# Patient Record
Sex: FEMALE | Race: WHITE | NOT HISPANIC OR LATINO | Employment: FULL TIME | URBAN - METROPOLITAN AREA
[De-identification: names, ages, dates, MRNs, and addresses within clinical notes are randomized per-mention and may not be internally consistent; named-entity substitution may affect disease eponyms.]

---

## 2023-03-07 PROBLEM — R10.9 ABDOMINAL CRAMPING: Status: ACTIVE | Noted: 2023-03-07

## 2023-03-07 PROBLEM — R09.82 POST-NASAL DRIP: Status: ACTIVE | Noted: 2023-03-07

## 2023-03-07 PROBLEM — M79.89 SWELLING OF INDEX FINGER: Status: ACTIVE | Noted: 2023-03-07

## 2023-03-07 PROBLEM — J02.9 SORE THROAT: Status: ACTIVE | Noted: 2023-03-07

## 2023-03-07 PROBLEM — K52.9 ILEITIS: Status: ACTIVE | Noted: 2023-03-07

## 2023-03-07 PROBLEM — J34.89 RHINORRHEA: Status: ACTIVE | Noted: 2023-03-07

## 2023-03-07 PROBLEM — K76.0 FATTY LIVER: Status: ACTIVE | Noted: 2023-03-07

## 2023-03-07 PROBLEM — S80.01XA CONTUSION OF KNEE, RIGHT: Status: ACTIVE | Noted: 2023-03-07

## 2023-03-07 PROBLEM — R44.8 FACIAL PRESSURE: Status: ACTIVE | Noted: 2023-03-07

## 2023-03-07 PROBLEM — R41.3 MEMORY LOSS: Status: ACTIVE | Noted: 2023-03-07

## 2023-03-07 PROBLEM — K50.90 CROHN DISEASE (MULTI): Status: ACTIVE | Noted: 2023-03-07

## 2023-03-07 PROBLEM — R43.8 DECREASED SENSE OF SMELL: Status: ACTIVE | Noted: 2023-03-07

## 2023-03-07 PROBLEM — F17.200 NEEDS SMOKING CESSATION EDUCATION: Status: ACTIVE | Noted: 2023-03-07

## 2023-03-07 PROBLEM — J32.2 CHRONIC ETHMOIDAL SINUSITIS: Status: ACTIVE | Noted: 2023-03-07

## 2023-03-07 PROBLEM — H66.90 OTITIS MEDIA, RECURRENT: Status: ACTIVE | Noted: 2023-03-07

## 2023-03-07 PROBLEM — M25.561 RIGHT KNEE PAIN: Status: ACTIVE | Noted: 2023-03-07

## 2023-03-07 PROBLEM — R05.9 COUGH IN ADULT: Status: ACTIVE | Noted: 2023-03-07

## 2023-03-07 PROBLEM — J32.0 CHRONIC MAXILLARY SINUSITIS: Status: ACTIVE | Noted: 2023-03-07

## 2023-03-07 PROBLEM — M72.2 PLANTAR FASCIITIS, BILATERAL: Status: ACTIVE | Noted: 2023-03-07

## 2023-03-07 PROBLEM — B34.9 VIRAL SYNDROME: Status: ACTIVE | Noted: 2023-03-07

## 2023-03-07 PROBLEM — K52.9 CHRONIC DIARRHEA: Status: ACTIVE | Noted: 2023-03-07

## 2023-03-07 PROBLEM — R09.81 NASAL CONGESTION: Status: ACTIVE | Noted: 2023-03-07

## 2023-03-07 PROBLEM — J33.9 NASAL POLYP: Status: ACTIVE | Noted: 2023-03-07

## 2023-03-07 RX ORDER — TRIAMCINOLONE ACETONIDE 55 UG/1
2 SPRAY, METERED NASAL DAILY
COMMUNITY
End: 2023-10-31 | Stop reason: WASHOUT

## 2023-03-09 ENCOUNTER — OFFICE VISIT (OUTPATIENT)
Dept: PRIMARY CARE | Facility: CLINIC | Age: 53
End: 2023-03-09
Payer: COMMERCIAL

## 2023-03-09 VITALS
WEIGHT: 167 LBS | RESPIRATION RATE: 16 BRPM | SYSTOLIC BLOOD PRESSURE: 112 MMHG | HEIGHT: 60 IN | DIASTOLIC BLOOD PRESSURE: 68 MMHG | BODY MASS INDEX: 32.79 KG/M2 | TEMPERATURE: 96.7 F | OXYGEN SATURATION: 97 % | HEART RATE: 85 BPM

## 2023-03-09 DIAGNOSIS — Z00.00 PHYSICAL EXAM: Primary | ICD-10-CM

## 2023-03-09 DIAGNOSIS — M79.641 HAND PAIN, NOT ARTHRALGIA, RIGHT: ICD-10-CM

## 2023-03-09 DIAGNOSIS — Z71.6 ENCOUNTER FOR SMOKING CESSATION COUNSELING: ICD-10-CM

## 2023-03-09 PROCEDURE — 3008F BODY MASS INDEX DOCD: CPT | Performed by: NURSE PRACTITIONER

## 2023-03-09 PROCEDURE — 99213 OFFICE O/P EST LOW 20 MIN: CPT | Performed by: NURSE PRACTITIONER

## 2023-03-09 RX ORDER — IBUPROFEN 200 MG
1 TABLET ORAL ONCE
Status: DISCONTINUED | OUTPATIENT
Start: 2023-03-09 | End: 2023-03-09

## 2023-03-09 RX ORDER — IBUPROFEN 200 MG
1 TABLET ORAL EVERY 24 HOURS
Qty: 30 PATCH | Refills: 2 | Status: SHIPPED | OUTPATIENT
Start: 2023-03-09 | End: 2023-10-20 | Stop reason: ALTCHOICE

## 2023-03-09 RX ORDER — ACETAMINOPHEN 500 MG
1 TABLET ORAL EVERY 8 HOURS PRN
COMMUNITY
Start: 2023-01-09

## 2023-03-09 ASSESSMENT — PAIN SCALES - GENERAL: PAINLEVEL: 4

## 2023-03-09 NOTE — PROGRESS NOTES
Subjective   Patient ID: Elena Michelle is a 52 y.o. female who presents for Quit smokiing and Carpal Tunnel.    HPI     Patient presents to clinic for yearly physical exam.  Patient states she has been feeling okay but tired has been caring for ill family members.  No specific complaints states she would like to stop smoking.  Patient has been smoking 1-1/2 packs/day for over 30 years.    Patient with past medical history of Crohn's being followed by gastroenterologist here at .    Today patient also complains of her hands feeling sore and numb at times.  She is a  and does repetitive motions with her hands.  She states she is unable to wear a wrist splint while working.    Review of Systems   Constitutional: Negative.    HENT:          History of nasal polyps some nasal congestion.   Eyes: Negative.    Respiratory: Negative.     Cardiovascular:  Negative for chest pain and palpitations.   Gastrointestinal:  Negative for diarrhea, nausea and vomiting.   Genitourinary: Negative.    Musculoskeletal:         Bilateral hand pain and numbness.  Has concerns for carpal tunnel.   Skin: Negative.    Neurological: Negative.              Objective   /68   Pulse 85   Temp 35.9 °C (96.7 °F) (Temporal)   Resp 16   Ht 1.524 m (5')   Wt 75.8 kg (167 lb)   SpO2 97%   BMI 32.61 kg/m²     Physical Exam  Constitutional:       Appearance: Normal appearance.   HENT:      Head: Normocephalic.   Eyes:      Extraocular Movements: Extraocular movements intact.      Pupils: Pupils are equal, round, and reactive to light.   Cardiovascular:      Rate and Rhythm: Normal rate and regular rhythm.   Pulmonary:      Effort: Pulmonary effort is normal.      Breath sounds: Normal breath sounds.   Abdominal:      General: Abdomen is flat. Bowel sounds are normal.      Palpations: Abdomen is soft.   Musculoskeletal:         General: Normal range of motion.      Comments: Hands appears some mild swelling.  Not warm to touch  normal range of motion neurovascular intact.   Skin:     General: Skin is warm and dry.   Neurological:      Mental Status: She is alert and oriented to person, place, and time.   Psychiatric:         Mood and Affect: Mood normal.         Assessment/Plan   Problem List Items Addressed This Visit    None  Visit Diagnoses       Physical exam    -  Primary    Relevant Orders    CBC    Comprehensive Metabolic Panel    Lipid Panel    Hemoglobin A1C    Vitamin D, Total    Tsh With Reflex To Free T4 If Abnormal    Iron and TIBC    Referral to Nutrition Services    Referral to Obstetrics / Gynecology    Encounter for smoking cessation counseling        Relevant Medications    nicotine (Nicoderm CQ) 14 mg/24 hr patch    Hand pain, not arthralgia, right        Relevant Orders    Referral to Orthopaedic Surgery             -wear wrist splint as needed.             -Tylenol/Ibuprofen as needed.

## 2023-03-10 ASSESSMENT — ENCOUNTER SYMPTOMS
PALPITATIONS: 0
RESPIRATORY NEGATIVE: 1
NAUSEA: 0
CONSTITUTIONAL NEGATIVE: 1
DIARRHEA: 0
EYES NEGATIVE: 1
VOMITING: 0
NEUROLOGICAL NEGATIVE: 1

## 2023-03-24 ENCOUNTER — LAB (OUTPATIENT)
Dept: LAB | Facility: LAB | Age: 53
End: 2023-03-24
Payer: COMMERCIAL

## 2023-03-24 DIAGNOSIS — Z00.00 PHYSICAL EXAM: ICD-10-CM

## 2023-03-24 LAB
ALANINE AMINOTRANSFERASE (SGPT) (U/L) IN SER/PLAS: 24 U/L (ref 7–45)
ALBUMIN (G/DL) IN SER/PLAS: 4.1 G/DL (ref 3.4–5)
ALKALINE PHOSPHATASE (U/L) IN SER/PLAS: 99 U/L (ref 33–110)
ANION GAP IN SER/PLAS: 10 MMOL/L (ref 10–20)
ASPARTATE AMINOTRANSFERASE (SGOT) (U/L) IN SER/PLAS: 18 U/L (ref 9–39)
BILIRUBIN TOTAL (MG/DL) IN SER/PLAS: 0.4 MG/DL (ref 0–1.2)
CALCIDIOL (25 OH VITAMIN D3) (NG/ML) IN SER/PLAS: 36 NG/ML
CALCIUM (MG/DL) IN SER/PLAS: 9.3 MG/DL (ref 8.6–10.3)
CARBON DIOXIDE, TOTAL (MMOL/L) IN SER/PLAS: 27 MMOL/L (ref 21–32)
CHLORIDE (MMOL/L) IN SER/PLAS: 105 MMOL/L (ref 98–107)
CHOLESTEROL (MG/DL) IN SER/PLAS: 236 MG/DL (ref 0–199)
CHOLESTEROL IN HDL (MG/DL) IN SER/PLAS: 47.8 MG/DL
CHOLESTEROL/HDL RATIO: 4.9
CREATININE (MG/DL) IN SER/PLAS: 0.76 MG/DL (ref 0.5–1.05)
ERYTHROCYTE DISTRIBUTION WIDTH (RATIO) BY AUTOMATED COUNT: 12.8 % (ref 11.5–14.5)
ERYTHROCYTE MEAN CORPUSCULAR HEMOGLOBIN CONCENTRATION (G/DL) BY AUTOMATED: 32.5 G/DL (ref 32–36)
ERYTHROCYTE MEAN CORPUSCULAR VOLUME (FL) BY AUTOMATED COUNT: 91 FL (ref 80–100)
ERYTHROCYTES (10*6/UL) IN BLOOD BY AUTOMATED COUNT: 4.34 X10E12/L (ref 4–5.2)
ESTIMATED AVERAGE GLUCOSE FOR HBA1C: 123 MG/DL
GFR FEMALE: >90 ML/MIN/1.73M2
GLUCOSE (MG/DL) IN SER/PLAS: 113 MG/DL (ref 74–99)
HEMATOCRIT (%) IN BLOOD BY AUTOMATED COUNT: 39.4 % (ref 36–46)
HEMOGLOBIN (G/DL) IN BLOOD: 12.8 G/DL (ref 12–16)
HEMOGLOBIN A1C/HEMOGLOBIN TOTAL IN BLOOD: 5.9 %
IRON (UG/DL) IN SER/PLAS: 79 UG/DL (ref 35–150)
IRON BINDING CAPACITY (UG/DL) IN SER/PLAS: 368 UG/DL (ref 240–445)
IRON SATURATION (%) IN SER/PLAS: 21 % (ref 25–45)
LDL: 160 MG/DL (ref 0–99)
LEUKOCYTES (10*3/UL) IN BLOOD BY AUTOMATED COUNT: 8.9 X10E9/L (ref 4.4–11.3)
NRBC (PER 100 WBCS) BY AUTOMATED COUNT: 0 /100 WBC (ref 0–0)
PLATELETS (10*3/UL) IN BLOOD AUTOMATED COUNT: 319 X10E9/L (ref 150–450)
POTASSIUM (MMOL/L) IN SER/PLAS: 4.3 MMOL/L (ref 3.5–5.3)
PROTEIN TOTAL: 6.6 G/DL (ref 6.4–8.2)
SODIUM (MMOL/L) IN SER/PLAS: 138 MMOL/L (ref 136–145)
THYROTROPIN (MIU/L) IN SER/PLAS BY DETECTION LIMIT <= 0.05 MIU/L: 4.68 MIU/L (ref 0.44–3.98)
THYROXINE (T4) FREE (NG/DL) IN SER/PLAS: 0.72 NG/DL (ref 0.61–1.12)
TRIGLYCERIDE (MG/DL) IN SER/PLAS: 139 MG/DL (ref 0–149)
UREA NITROGEN (MG/DL) IN SER/PLAS: 11 MG/DL (ref 6–23)
VLDL: 28 MG/DL (ref 0–40)

## 2023-03-24 PROCEDURE — 36415 COLL VENOUS BLD VENIPUNCTURE: CPT

## 2023-03-24 PROCEDURE — 84443 ASSAY THYROID STIM HORMONE: CPT

## 2023-03-24 PROCEDURE — 80053 COMPREHEN METABOLIC PANEL: CPT

## 2023-03-24 PROCEDURE — 83550 IRON BINDING TEST: CPT

## 2023-03-24 PROCEDURE — 84439 ASSAY OF FREE THYROXINE: CPT

## 2023-03-24 PROCEDURE — 82306 VITAMIN D 25 HYDROXY: CPT

## 2023-03-24 PROCEDURE — 83540 ASSAY OF IRON: CPT

## 2023-03-24 PROCEDURE — 83036 HEMOGLOBIN GLYCOSYLATED A1C: CPT

## 2023-03-24 PROCEDURE — 85027 COMPLETE CBC AUTOMATED: CPT

## 2023-03-24 PROCEDURE — 80061 LIPID PANEL: CPT

## 2023-03-28 ENCOUNTER — TELEPHONE (OUTPATIENT)
Dept: PRIMARY CARE | Facility: CLINIC | Age: 53
End: 2023-03-28
Payer: COMMERCIAL

## 2023-03-28 NOTE — TELEPHONE ENCOUNTER
Phoned patient to discuss lab results.  TSH level 4.68 T4 normal.,  Discussed supplements with patient that can alter thyroid levels.  Recheck TSH level in 6 weeks for next visit., serum cholesterol and LDL both elevated discussed healthy lifestyle changes with diet and exercises.

## 2023-09-24 ENCOUNTER — HOSPITAL ENCOUNTER (OUTPATIENT)
Dept: DATA CONVERSION | Facility: HOSPITAL | Age: 53
Setting detail: OBSERVATION
Discharge: HOME | End: 2023-09-28
Attending: INTERNAL MEDICINE | Admitting: INTERNAL MEDICINE
Payer: COMMERCIAL

## 2023-09-24 LAB
ALANINE AMINOTRANSFERASE (SGPT) (U/L) IN SER/PLAS: 35 U/L (ref 7–45)
ALBUMIN (G/DL) IN SER/PLAS: 4.2 G/DL (ref 3.4–5)
ALKALINE PHOSPHATASE (U/L) IN SER/PLAS: 102 U/L (ref 33–110)
ANION GAP IN SER/PLAS: 14 MMOL/L (ref 10–20)
ASPARTATE AMINOTRANSFERASE (SGOT) (U/L) IN SER/PLAS: 24 U/L (ref 9–39)
BASOPHILS (10*3/UL) IN BLOOD BY AUTOMATED COUNT: 0.04 X10E9/L (ref 0–0.1)
BASOPHILS/100 LEUKOCYTES IN BLOOD BY AUTOMATED COUNT: 0.3 % (ref 0–2)
BILIRUBIN TOTAL (MG/DL) IN SER/PLAS: 0.6 MG/DL (ref 0–1.2)
CALCIUM (MG/DL) IN SER/PLAS: 9.9 MG/DL (ref 8.6–10.3)
CARBON DIOXIDE, TOTAL (MMOL/L) IN SER/PLAS: 25 MMOL/L (ref 21–32)
CHLORIDE (MMOL/L) IN SER/PLAS: 101 MMOL/L (ref 98–107)
CREATININE (MG/DL) IN SER/PLAS: 0.75 MG/DL (ref 0.5–1.05)
EOSINOPHILS (10*3/UL) IN BLOOD BY AUTOMATED COUNT: 0.49 X10E9/L (ref 0–0.7)
EOSINOPHILS/100 LEUKOCYTES IN BLOOD BY AUTOMATED COUNT: 3.7 % (ref 0–6)
ERYTHROCYTE DISTRIBUTION WIDTH (RATIO) BY AUTOMATED COUNT: 12.6 % (ref 11.5–14.5)
ERYTHROCYTE MEAN CORPUSCULAR HEMOGLOBIN CONCENTRATION (G/DL) BY AUTOMATED: 32.8 G/DL (ref 32–36)
ERYTHROCYTE MEAN CORPUSCULAR VOLUME (FL) BY AUTOMATED COUNT: 86 FL (ref 80–100)
ERYTHROCYTES (10*6/UL) IN BLOOD BY AUTOMATED COUNT: 4.9 X10E12/L (ref 4–5.2)
GFR FEMALE: >90 ML/MIN/1.73M2
GLUCOSE (MG/DL) IN SER/PLAS: 100 MG/DL (ref 74–99)
HEMATOCRIT (%) IN BLOOD BY AUTOMATED COUNT: 42.1 % (ref 36–46)
HEMOGLOBIN (G/DL) IN BLOOD: 13.8 G/DL (ref 12–16)
IMMATURE GRANULOCYTES/100 LEUKOCYTES IN BLOOD BY AUTOMATED COUNT: 0.3 % (ref 0–0.9)
LEUKOCYTES (10*3/UL) IN BLOOD BY AUTOMATED COUNT: 13.4 X10E9/L (ref 4.4–11.3)
LYMPHOCYTES (10*3/UL) IN BLOOD BY AUTOMATED COUNT: 3.7 X10E9/L (ref 1.2–4.8)
LYMPHOCYTES/100 LEUKOCYTES IN BLOOD BY AUTOMATED COUNT: 27.6 % (ref 13–44)
MAGNESIUM (MG/DL) IN SER/PLAS: 2.06 MG/DL (ref 1.6–2.4)
MONOCYTES (10*3/UL) IN BLOOD BY AUTOMATED COUNT: 0.73 X10E9/L (ref 0.1–1)
MONOCYTES/100 LEUKOCYTES IN BLOOD BY AUTOMATED COUNT: 5.4 % (ref 2–10)
NEUTROPHILS (10*3/UL) IN BLOOD BY AUTOMATED COUNT: 8.42 X10E9/L (ref 1.2–7.7)
NEUTROPHILS/100 LEUKOCYTES IN BLOOD BY AUTOMATED COUNT: 62.7 % (ref 40–80)
NRBC (PER 100 WBCS) BY AUTOMATED COUNT: 0 /100 WBC (ref 0–0)
PLATELETS (10*3/UL) IN BLOOD AUTOMATED COUNT: 369 X10E9/L (ref 150–450)
POTASSIUM (MMOL/L) IN SER/PLAS: 3.6 MMOL/L (ref 3.5–5.3)
PROTEIN TOTAL: 7 G/DL (ref 6.4–8.2)
SODIUM (MMOL/L) IN SER/PLAS: 136 MMOL/L (ref 136–145)
UREA NITROGEN (MG/DL) IN SER/PLAS: 17 MG/DL (ref 6–23)

## 2023-09-25 LAB
ALANINE AMINOTRANSFERASE (SGPT) (U/L) IN SER/PLAS: 34 U/L (ref 7–45)
ALBUMIN (G/DL) IN SER/PLAS: 3.3 G/DL (ref 3.4–5)
ALKALINE PHOSPHATASE (U/L) IN SER/PLAS: 84 U/L (ref 33–110)
ANION GAP IN SER/PLAS: 9 MMOL/L (ref 10–20)
ASPARTATE AMINOTRANSFERASE (SGOT) (U/L) IN SER/PLAS: 27 U/L (ref 9–39)
BILIRUBIN TOTAL (MG/DL) IN SER/PLAS: 0.6 MG/DL (ref 0–1.2)
C. DIFFICILE TOXIN, PCR: NOT DETECTED
CALCIUM (MG/DL) IN SER/PLAS: 9.2 MG/DL (ref 8.6–10.3)
CARBON DIOXIDE, TOTAL (MMOL/L) IN SER/PLAS: 28 MMOL/L (ref 21–32)
CHLORIDE (MMOL/L) IN SER/PLAS: 106 MMOL/L (ref 98–107)
CREATININE (MG/DL) IN SER/PLAS: 0.84 MG/DL (ref 0.5–1.05)
ERYTHROCYTE DISTRIBUTION WIDTH (RATIO) BY AUTOMATED COUNT: 12.7 % (ref 11.5–14.5)
ERYTHROCYTE MEAN CORPUSCULAR HEMOGLOBIN CONCENTRATION (G/DL) BY AUTOMATED: 32.8 G/DL (ref 32–36)
ERYTHROCYTE MEAN CORPUSCULAR VOLUME (FL) BY AUTOMATED COUNT: 88 FL (ref 80–100)
ERYTHROCYTES (10*6/UL) IN BLOOD BY AUTOMATED COUNT: 4.14 X10E12/L (ref 4–5.2)
GFR FEMALE: 84 ML/MIN/1.73M2
GLUCOSE (MG/DL) IN SER/PLAS: 121 MG/DL (ref 74–99)
HEMATOCRIT (%) IN BLOOD BY AUTOMATED COUNT: 36.6 % (ref 36–46)
HEMOGLOBIN (G/DL) IN BLOOD: 12 G/DL (ref 12–16)
LEUKOCYTES (10*3/UL) IN BLOOD BY AUTOMATED COUNT: 10.7 X10E9/L (ref 4.4–11.3)
NRBC (PER 100 WBCS) BY AUTOMATED COUNT: 0 /100 WBC (ref 0–0)
PLATELETS (10*3/UL) IN BLOOD AUTOMATED COUNT: 325 X10E9/L (ref 150–450)
POTASSIUM (MMOL/L) IN SER/PLAS: 3.6 MMOL/L (ref 3.5–5.3)
PROTEIN TOTAL: 5.5 G/DL (ref 6.4–8.2)
SODIUM (MMOL/L) IN SER/PLAS: 139 MMOL/L (ref 136–145)
UREA NITROGEN (MG/DL) IN SER/PLAS: 13 MG/DL (ref 6–23)

## 2023-09-26 LAB
ANION GAP IN SER/PLAS: 11 MMOL/L (ref 10–20)
ANION GAP IN SER/PLAS: 12 MMOL/L (ref 10–20)
APPEARANCE, URINE: NORMAL
ASCORBIC ACID: NORMAL MG/DL
BILIRUBIN, URINE: NORMAL
BLOOD, URINE: NORMAL
CALCIUM (MG/DL) IN SER/PLAS: 9 MG/DL (ref 8.6–10.3)
CALCIUM (MG/DL) IN SER/PLAS: 9.4 MG/DL (ref 8.6–10.3)
CAMPYLOBACTER GP: NOT DETECTED
CARBON DIOXIDE, TOTAL (MMOL/L) IN SER/PLAS: 24 MMOL/L (ref 21–32)
CARBON DIOXIDE, TOTAL (MMOL/L) IN SER/PLAS: 26 MMOL/L (ref 21–32)
CHLORIDE (MMOL/L) IN SER/PLAS: 105 MMOL/L (ref 98–107)
CHLORIDE (MMOL/L) IN SER/PLAS: 107 MMOL/L (ref 98–107)
COLOR, URINE: NORMAL
CREATININE (MG/DL) IN SER/PLAS: 0.64 MG/DL (ref 0.5–1.05)
CREATININE (MG/DL) IN SER/PLAS: 0.82 MG/DL (ref 0.5–1.05)
ERYTHROCYTE DISTRIBUTION WIDTH (RATIO) BY AUTOMATED COUNT: 12.5 % (ref 11.5–14.5)
ERYTHROCYTE MEAN CORPUSCULAR HEMOGLOBIN CONCENTRATION (G/DL) BY AUTOMATED: 33.8 G/DL (ref 32–36)
ERYTHROCYTE MEAN CORPUSCULAR VOLUME (FL) BY AUTOMATED COUNT: 87 FL (ref 80–100)
ERYTHROCYTES (10*6/UL) IN BLOOD BY AUTOMATED COUNT: 4.24 X10E12/L (ref 4–5.2)
GFR FEMALE: 86 ML/MIN/1.73M2
GFR FEMALE: >90 ML/MIN/1.73M2
GLUCOSE (MG/DL) IN SER/PLAS: 124 MG/DL (ref 74–99)
GLUCOSE (MG/DL) IN SER/PLAS: 132 MG/DL (ref 74–99)
GLUCOSE, URINE: NORMAL
HEMATOCRIT (%) IN BLOOD BY AUTOMATED COUNT: 36.7 % (ref 36–46)
HEMOGLOBIN (G/DL) IN BLOOD: 12.4 G/DL (ref 12–16)
KETONES, URINE: NORMAL
LEUKOCYTE ESTERASE, URINE: NORMAL
LEUKOCYTES (10*3/UL) IN BLOOD BY AUTOMATED COUNT: 7.3 X10E9/L (ref 4.4–11.3)
NITRITE, URINE: NORMAL
NOROVIRUS GI/GII: NOT DETECTED
NRBC (PER 100 WBCS) BY AUTOMATED COUNT: 0 /100 WBC (ref 0–0)
PH, URINE: NORMAL
PLATELETS (10*3/UL) IN BLOOD AUTOMATED COUNT: 329 X10E9/L (ref 150–450)
POTASSIUM (MMOL/L) IN SER/PLAS: 3.6 MMOL/L (ref 3.5–5.3)
POTASSIUM (MMOL/L) IN SER/PLAS: 3.7 MMOL/L (ref 3.5–5.3)
PROTEIN, URINE: NORMAL
ROTAVIRUS A: NOT DETECTED
SALMONELLA SP.: NOT DETECTED
SHIGA TOXIN 1: NOT DETECTED
SHIGA TOXIN 2: NOT DETECTED
SHIGELLA SP.: NOT DETECTED
SODIUM (MMOL/L) IN SER/PLAS: 137 MMOL/L (ref 136–145)
SODIUM (MMOL/L) IN SER/PLAS: 140 MMOL/L (ref 136–145)
SPECIFIC GRAVITY, URINE: NORMAL
UREA NITROGEN (MG/DL) IN SER/PLAS: 5 MG/DL (ref 6–23)
UREA NITROGEN (MG/DL) IN SER/PLAS: 8 MG/DL (ref 6–23)
UROBILINOGEN, URINE: NORMAL
VIBRIO GRP.: NOT DETECTED
YERSINIA ENTEROCOLITICA: NOT DETECTED

## 2023-09-27 LAB
ANION GAP IN SER/PLAS: 12 MMOL/L (ref 10–20)
CALCIUM (MG/DL) IN SER/PLAS: 9 MG/DL (ref 8.6–10.3)
CARBON DIOXIDE, TOTAL (MMOL/L) IN SER/PLAS: 26 MMOL/L (ref 21–32)
CHLORIDE (MMOL/L) IN SER/PLAS: 104 MMOL/L (ref 98–107)
CREATININE (MG/DL) IN SER/PLAS: 0.68 MG/DL (ref 0.5–1.05)
ERYTHROCYTE DISTRIBUTION WIDTH (RATIO) BY AUTOMATED COUNT: 12.4 % (ref 11.5–14.5)
ERYTHROCYTE MEAN CORPUSCULAR HEMOGLOBIN CONCENTRATION (G/DL) BY AUTOMATED: 33 G/DL (ref 32–36)
ERYTHROCYTE MEAN CORPUSCULAR VOLUME (FL) BY AUTOMATED COUNT: 86 FL (ref 80–100)
ERYTHROCYTES (10*6/UL) IN BLOOD BY AUTOMATED COUNT: 4.22 X10E12/L (ref 4–5.2)
GFR FEMALE: >90 ML/MIN/1.73M2
GLUCOSE (MG/DL) IN SER/PLAS: 110 MG/DL (ref 74–99)
HEMATOCRIT (%) IN BLOOD BY AUTOMATED COUNT: 36.1 % (ref 36–46)
HEMOGLOBIN (G/DL) IN BLOOD: 11.9 G/DL (ref 12–16)
LEUKOCYTES (10*3/UL) IN BLOOD BY AUTOMATED COUNT: 9.6 X10E9/L (ref 4.4–11.3)
MAGNESIUM (MG/DL) IN SER/PLAS: 1.77 MG/DL (ref 1.6–2.4)
NRBC (PER 100 WBCS) BY AUTOMATED COUNT: 0 /100 WBC (ref 0–0)
PLATELETS (10*3/UL) IN BLOOD AUTOMATED COUNT: 305 X10E9/L (ref 150–450)
POTASSIUM (MMOL/L) IN SER/PLAS: 3.3 MMOL/L (ref 3.5–5.3)
SODIUM (MMOL/L) IN SER/PLAS: 139 MMOL/L (ref 136–145)
UREA NITROGEN (MG/DL) IN SER/PLAS: 5 MG/DL (ref 6–23)

## 2023-09-29 VITALS
DIASTOLIC BLOOD PRESSURE: 87 MMHG | SYSTOLIC BLOOD PRESSURE: 154 MMHG | TEMPERATURE: 98.1 F | WEIGHT: 158.73 LBS | OXYGEN SATURATION: 96 % | HEART RATE: 83 BPM | BODY MASS INDEX: 29.97 KG/M2 | HEIGHT: 61 IN | RESPIRATION RATE: 18 BRPM

## 2023-09-30 NOTE — PROGRESS NOTES
Service: General Internal Medicine     Subjective Data:   JEROME DILLON is a 53 year old Female who is Hospital Day # 3.    Additional Information:    No events overnight. Stool studies negative. Started on steroids. Feeling slightly better but hesitant to eat.    Objective Data:     Objective Information:      T   P  R  BP   MAP  SpO2   Value  36  75  18  123/65   89  94%  Date/Time 9/26 15:32 9/26 15:32 9/26 15:32 9/26 15:32  9/26 15:32 9/26 15:32  Range  (36C - 36.7C )  (61 - 79 )  (18 - 18 )  (85 - 165 )/ (48 - 82 )  (63 - 116 )  (92% - 98% )      Pain reported at 9/26 10:00: 0 = None    Physical Exam by System:    Constitutional: Well developed, awake/alert/oriented  x3, no distress, alert and cooperative   Eyes: clear sclera   ENMT: mucous membranes moist, no apparent injury,  no lesions seen   Head/Neck: Neck supple, no apparent injury   Respiratory/Thorax: Patent airways, CTAB, normal  breath sounds with good chest expansion, thorax symmetric   Cardiovascular: Regular rate and rhythm, no murmurs,  2+ equal pulses of the extremities, normal S 1and S 2   Gastrointestinal: mildly distended, soft, tender  right side, no rebound tenderness or guarding, no masses palpable, no organomegaly, +BS   Musculoskeletal: ROM intact, no joint swelling, normal  strength   Extremities: normal extremities, no cyanosis, no  edema, no contusions or wounds, no clubbing   Neurological: alert and oriented, intact motor response,  normal strength   Lymphatic: No significant lymphadenopathy   Psychological: Appropriate mood and behavior   Skin: warm and dry, no lesions, no rashes     Medication:    Medications:          Continuous Medications       --------------------------------    1. Dextrose 5% - Lactated Ringers Infusion:  1000  mL  IntraVenous  <Continuous>         Scheduled Medications       --------------------------------    1. Cefepime IV Piggy Back:  2  gram(s)  IntraVenous Piggyback  Every 8 Hours    2. Enoxaparin  SubCutaneous:  40  mg  SubCutaneous  Every 24 Hours    3. methylPREDNISolone Sodium Succinate Injectable:  60  mg  IntraVenous Push  Every 24 Hours    4. metroNIDAZOLE (FLAGYL) 500 mg IVPB/ Premixed Soln 100 mL:  100  mL  IntraVenous Piggyback  Every 8 Hours    5. Nicotine 21 mg/ 24 hour TransDermal:  1  patch  TransDermal  Every 24 Hours    6. Pantoprazole Injectable:  40  mg  IntraVenous Push  Every 24 Hours         PRN Medications       --------------------------------    1. Morphine Injectable:  4  mg  IntraVenous Push  Every 4 Hours    2. Nicotine Transmucosal:  2  mg  Oral  Every 4 Hours    3. Ondansetron Injectable:  4  mg  IntraVenous Push  Every 6 Hours    4. Sodium Chloride 0.9% Injectable Flush:  10  mL  IntraVenous Flush  Every 8 Hours and as Needed        Recent Lab Results:    Results:        BMP: 9/26/2023 08:33  NA+        Cl-     BUN  /                         140    107    8  /  --------------------------------  Glucose                ---------------------------  132 H    K+     HCO3-   Creat \                         3.7  26    0.82  \  Calcium : 9.4     Anion Gap : 11      Assessment and Plan:   Code Status:  ·  Code Status Full Code     Assessment:    JEROME DILLON is a 53 year old Female with past medical history of Crohn's disease, chronic sinusitis  and nasal obstruction with bilateral sinus/nasal surgery who presented today with abdominal pain, nausea, vomiting, and diarrhea.    A/P:    1. Pancolitis  Suspected Crohn's Flare    admit to [facility]  observation  appreciate GI recommendation  start cefepime and flagyl  Stool studies negative, C diff negative  D5LR at 125cc/hr  continue morphine  CLD  CBC, CMP in am  Budesonide     2. DVT Prophylaxis    SCDs  lovenox          Electronic Signatures:  Surjit Rodriguez ()  (Signed 26-Sep-2023 20:23)   Authored: Service, Subjective Data, Objective Data, Assessment  and Plan, Note Completion      Last Updated: 26-Sep-2023 20:23 by Michael  Surjit JOY)

## 2023-09-30 NOTE — DISCHARGE SUMMARY
Send Summary:   Discharge Summary Providers:  Provider Role Provider Name   · Attending Surjit Rodriguez   · Consulting Haresh Parnell   · Primary Aleja Box   · Primary Moisés Santos       Note Recipients: Aleja Box APRN-CNP       Discharge:    Summary:   Admission Date: .24-Sep-2023 10:09:00   Discharge Date: 27-Sep-2023   Attending Physician at Discharge: Surjit Rodriguez   Admission Reason: pancolitis   Final Discharge Diagnoses: Pancolitis   Procedures: none   Condition at Discharge: Satisfactory   Disposition at Discharge: .Home   Physical Exam:    Constitutional: Well developed, awake/alert/oriented  x3, no distress, alert and cooperative   Eyes: clear sclera   ENMT: mucous membranes moist, no apparent  injury, no lesions seen   Head/Neck: Neck supple, no apparent injury   Respiratory/Thorax: Patent airways, CTAB,  normal breath sounds with good chest expansion, thorax symmetric   Cardiovascular: Regular rate and rhythm,  no murmurs, 2+ equal pulses of the extremities, normal S 1and S 2   Gastrointestinal: mildly distended, soft,  tender right side, no rebound tenderness or guarding, no masses palpable, no organomegaly, +BS   Musculoskeletal: ROM intact, no joint swelling,  normal strength   Extremities: normal extremities, no cyanosis,  no edema, no contusions or wounds, no clubbing   Neurological: alert and oriented, intact  motor response, normal strength   Lymphatic: No significant lymphadenopathy   Psychological: Appropriate mood and behavior   Skin: warm and dry, no lesions, no rashes     Hospital Course:    JEROME DILLON is a 53 year old Female with past medical history of Crohn's disease, chronic sinusitis  and nasal obstruction with bilateral sinus/nasal surgery who presented today with abdominal pain, nausea, vomiting, and diarrhea.    A/P:    1. Pancolitis  Suspected Crohn's Flare    admit to [facility]  observation  appreciate GI recommendation  start cefepime  and flagyl  Stool studies negative, C diff negative  D5LR at 125cc/hr  continue morphine  CLD  CBC, CMP in am  Budesonide     2. DVT Prophylaxis    SCDs  lovenox    Dispo: treated with steroids by GI and discharged home after tolerating a diet.      Discharge Information:    and Continuing Care:   Lab Results - Pending:    None  Radiology Results - Pending: None   Discharge Instructions:    Activity:           activity as tolerated.    Nutrition/Diet:           GI soft, low fiber    Discharge Medications: Home Medication   budesonide 9 mg oral tablet, extended release - 1 tab(s) orally once a day   nicotine 21 mg/24 hr transdermal film, extended release - 1 patch transdermal every 24 hours  amoxicillin-clavulanate 875 mg-125 mg oral tablet - 1 tab(s) orally 2 times a day      PRN Medication     DNR Status:   ·  Code Status Code Status order at time of discharge: Full Code       Electronic Signatures:  Surjit Rodriguez)  (Signed 29-Sep-2023 14:37)   Authored: Send Summary, Summary Content, Ongoing Care,  DNR Status, Note Completion      Last Updated: 29-Sep-2023 14:37 by Surjit Rodriguez ()

## 2023-09-30 NOTE — PROGRESS NOTES
Service: Gastroenterology     Subjective Data:   JEROME DILLON is a 53 year old Female who is Hospital Day # 4.     Patient states her symptoms initially improved yesterday after IV steroid administration, however later that evening her symptoms returned.  States her diarrhea and abdominal pain is not as severe as  it was when she first presented.    Objective Data:     Objective Information:      T   P  R  BP   MAP  SpO2   Value  36.5  77  18  156/87   117  93%  Date/Time 9/27 7:33 9/27 7:33 9/27 7:33 9/27 7:33 9/27 7:33 9/27 7:33  Range  (36C - 36.7C )  (72 - 79 )  (16 - 18 )  (116 - 156 )/ (58 - 87 )  (83 - 117 )  (92% - 94% )      Pain reported at 9/26 22:50: 8 = Severe    Physical Exam Narrative:  ·  Physical Exam:    Physical Exam:  General: Alert, awake.  Cooperative.  HEENT:  Normocephalic, atraumatic, mucus membranes moist.   Neck:  Trachea midline.  No JVD.    Chest:  Clear to auscultation bilaterally. No wheezes, rales, or rhonchi.  CV:  Regular rate and rhythm.  Positive S1/S2. No murmur, no gallops, no rubs  GI: Bowel sounds present in all four quadrants, abdomen is soft,  tender to palpation.  Extremities:  No lower extremity edema or cyanosis.   Neurological:  AAOx3. No focal deficits.  Skin:  Warm and dry.    Medication:    Medications:          Continuous Medications       --------------------------------    1. Dextrose 5% - Lactated Ringers Infusion:  1000  mL  IntraVenous  <Continuous>         Scheduled Medications       --------------------------------    1. Cefepime IV Piggy Back:  2  gram(s)  IntraVenous Piggyback  Every 8 Hours    2. Enoxaparin SubCutaneous:  40  mg  SubCutaneous  Every 24 Hours    3. methylPREDNISolone Sodium Succinate Injectable:  60  mg  IntraVenous Push  Every 24 Hours    4. metroNIDAZOLE (FLAGYL) 500 mg IVPB/ Premixed Soln 100 mL:  100  mL  IntraVenous Piggyback  Every 8 Hours    5. Nicotine 21 mg/ 24 hour TransDermal:  1  patch  TransDermal  Every 24 Hours    6.  Pantoprazole Injectable:  40  mg  IntraVenous Push  Every 24 Hours         PRN Medications       --------------------------------    1. Morphine Injectable:  2  mg  IntraVenous Push  Every 4 Hours    2. Nicotine Transmucosal:  2  mg  Oral  Every 4 Hours    3. Ondansetron Injectable:  4  mg  IntraVenous Push  Every 6 Hours    4. Sodium Chloride 0.9% Injectable Flush:  10  mL  IntraVenous Flush  Every 8 Hours and as Needed        Recent Lab Results:    Results:    CBC: 9/27/2023 06:27              \     Hgb     /                              \     11.9 L    /  WBC  ----------------  Plt               9.6       ----------------    305              /     Hct     \                              /     36.1       \            RBC: 4.22     MCV: 86           BMP: 9/27/2023 06:27  NA+        Cl-     BUN  /                         139    104    5 L /  --------------------------------  Glucose                ---------------------------  110 H    K+     HCO3-   Creat \                         3.3 L 26    0.68  \  Calcium : 9.0     Anion Gap : 12      Assessment and Plan:   Code Status:  ·  Code Status Full Code     Assessment:    53 year old Female with past medical history of Crohn's disease, chronic sinusitis and nasal obstruction  with bilateral sinus/nasal surgery who presented for abdominal pain and diarrhea.     #Acute Crohn's flare  -Advance to soft diet  -Patient reports improvement with IV solumedrol, which can be continued while she is inpatient.    -Upon discharge she should be transitioned to budesonide 9 mg daily until outpatient GI follow-up with Dr. Parnell.    Ede Cannon D.O.  Internal Medicine PGY-3  [id number] or Ar[facility]    This is a preliminary note with physician attestation to follow.        Attestation:   Note Completion:  I am a:  Resident/Fellow   Attending Attestation I saw and evaluated the patient.  I personally obtained the key and critical portions of the history and physical exam or was  physically present for key and  critical portions performed by the resident/fellow. I reviewed the resident/fellow?s documentation and discussed the patient with the resident/fellow.  I agree with the resident/fellow?s medical decision making as documented in the note.     I personally evaluated the patient on 27-Sep-2023   Comments/ Additional Findings    Ok to keep follow up on 10/9. D/C on budesonide 9mg daily. If no improvement she can contact office and change to prednisone 40-60mg daily.  Needs  to re assess long term therapy and re consider Stelara with Dr. Parnell or consider another biologic given now enterocolitis present on CT          Electronic Signatures:  Ede Cannon (DO (Resident))  (Signed 27-Sep-2023 14:30)   Authored: Service, Subjective Data, Objective Data, Assessment  and Plan, Note Completion  Henna Vinson)  (Signed 27-Sep-2023 21:39)   Authored: Note Completion   Co-Signer: Service, Subjective Data, Objective Data, Assessment and Plan, Note Completion      Last Updated: 27-Sep-2023 21:39 by Henna Vinson)

## 2023-09-30 NOTE — PROGRESS NOTES
Service: Gastroenterology     Subjective Data:   JEROME DILLON is a 53 year old Female who is Hospital Day # 3.     Patient states her symptoms returned after having apple juice yesterday.  States she had 10+ watery bowel movements and abdominal pain.  She also endorses nausea.    Objective Data:     Objective Information:      T   P  R  BP   MAP  SpO2   Value  36.5  79  18  127/58   83  92%  Date/Time 9/26 7:36 9/26 7:36 9/26 7:36 9/26 7:36  9/26 7:36 9/26 7:36  Range  (36C - 36.7C )  (61 - 79 )  (18 - 18 )  (85 - 165 )/ (48 - 82 )  (63 - 116 )  (92% - 98% )      Pain reported at 9/26 10:00: 0 = None    Physical Exam Narrative:  ·  Physical Exam:    Physical Exam:  General: Alert, awake.  Cooperative.  HEENT:  Normocephalic, atraumatic, mucus membranes moist.   Neck:  Trachea midline.  No JVD.    Chest:  Clear to auscultation bilaterally. No wheezes, rales, or rhonchi.  CV:  Regular rate and rhythm.  Positive S1/S2. No murmur, no gallops, no rubs  GI: Bowel sounds present in all four quadrants, abdomen is soft,  tender to palpation.  Extremities:  No lower extremity edema or cyanosis.   Neurological:  AAOx3. No focal deficits.  Skin:  Warm and dry.    Medication:    Medications:          Continuous Medications       --------------------------------    1. Dextrose 5% - Lactated Ringers Infusion:  1000  mL  IntraVenous  <Continuous>         Scheduled Medications       --------------------------------    1. Cefepime IV Piggy Back:  2  gram(s)  IntraVenous Piggyback  Every 8 Hours    2. Enoxaparin SubCutaneous:  40  mg  SubCutaneous  Every 24 Hours    3. Influenza Virus QUADRIVALENT (Inactive) ADULT Vaccine:  0.5  mL  IntraMuscular  Once    4. methylPREDNISolone Sodium Succinate Injectable:  60  mg  IntraVenous Push  Every 24 Hours    5. metroNIDAZOLE (FLAGYL) 500 mg IVPB/ Premixed Soln 100 mL:  100  mL  IntraVenous Piggyback  Every 8 Hours    6. Nicotine 21 mg/ 24 hour TransDermal:  1  patch  TransDermal   Every 24 Hours    7. Pantoprazole Injectable:  40  mg  IntraVenous Push  Every 24 Hours         PRN Medications       --------------------------------    1. Morphine Injectable:  4  mg  IntraVenous Push  Every 4 Hours    2. Nicotine Transmucosal:  2  mg  Oral  Every 4 Hours    3. Ondansetron Injectable:  4  mg  IntraVenous Push  Every 6 Hours    4. Sodium Chloride 0.9% Injectable Flush:  10  mL  IntraVenous Flush  Every 8 Hours and as Needed        Recent Lab Results:    Results:        BMP: 9/26/2023 08:33  NA+        Cl-     BUN  /                         140    107    8  /  --------------------------------  Glucose                ---------------------------  132 H    K+     HCO3-   Creat \                         3.7  26    0.82  \  Calcium : 9.4     Anion Gap : 11      Assessment and Plan:   Code Status:  ·  Code Status Full Code     Assessment:    53 year old Female with past medical history of Crohn's disease, chronic sinusitis and nasal obstruction  with bilateral sinus/nasal surgery who presented for abdominal pain and diarrhea.     #Acute Crohn's flare  -Stool pathogen and C. difficile PCR negative  -As patient's symptoms have returned, will treat with IV Solumedrol 60 mg daily.  Recommend  at least observing patient overnight.  Patient is agreeable to this.  -Maintain outpatient follow-up with Dr. Parmjit Cannon D.O.  Internal Medicine PGY-3  [id number] or [facility]    This is a preliminary note with physician attestation to follow.        Attestation:   Note Completion:  I am a:  Resident/Fellow   Attending Attestation I saw and evaluated the patient.  I personally obtained the key and critical portions of the history and physical exam or was physically present for key and  critical portions performed by the resident/fellow. I reviewed the resident/fellow?s documentation and discussed the patient with the resident/fellow.  I agree with the resident/fellow?s medical decision making as documented  in the note.     I personally evaluated the patient on 26-Sep-2023         Electronic Signatures:  Ede Cannon (DO (Resident))  (Signed 26-Sep-2023 15:11)   Authored: Service, Subjective Data, Objective Data, Assessment  and Plan, Note Completion  Henna Vinson)  (Signed 27-Sep-2023 21:37)   Authored: Note Completion   Co-Signer: Service, Subjective Data, Objective Data, Assessment and Plan, Note Completion      Last Updated: 27-Sep-2023 21:37 by Henna Vinson)

## 2023-09-30 NOTE — H&P
History of Present Illness:   Pregnant/Lactating:  ·  Are You Pregnant no (1)   ·  Are You Currently Breastfeeding no (1)     HPI:    JEROME DILLON is a 53 year old Female with past medical history of Crohn's disease,  chronic sinusitis and nasal obstruction with bilateral sinus/nasal surgery who presented today with abdominal pain, nausea, vomiting, and diarrhea.  Patient reports over the past week or so she has  been experiencing inability to keep anything down.  She notes vomiting of nonbloody, non-coffee-ground emesis, 8 out of 10 sharp epigastric pain, and none tarry, nonbloody watery diarrhea too many times a day to count.  She admits to associated chills.   She reports she has a history of Crohn's disease and sees Dr. Parnell.  She reports he prescribed her Stelara but unfortunately she was unable to afford the medication and takes nothing for her Crohn's disease currently.    In the ED labs were performed and revealed white blood cell count 13.4 (14.5 yesterday), neutrophil count 8.42 (10.21 yesterday) a CT of the abdomen and pelvis was performed yesterday and revealed:    Wall thickening and edema of segments of the distal ileum and  terminal ileum compatible with enteritis and patient's known  inflammatory bowel disease. There is also interval development of  diffuse pancolonic wall thickening and edema compatible with  pancolitis.      Patient was recommended to be admitted yesterday, but due to issues at home elected to be discharged and is back today with continued abdominal pain, nausea, vomiting, and unable to tolerate by mouth.  She arrived with acceptable vital signs and was given  IV fluids, Zofran, and morphine and admitted for further evaluation and treatment.      Past medical history: As above  Past surgical history: As above, cholecystectomy, tonsillectomy, hysterectomy  Social history: Smokes a pack of cigarettes per day, denies alcohol or illicit  drug use.  Lives with family.  Family  history: Father: Lung disease, no history of diagnosed Crohn's disease but reports her sister  has GI issues  A 10 point review of systems has been performed and is negative besides what is stated in HPI.        Social History:   Social History:  Smoking Status light user (uses <10 cig/day, OR <0.5 ppd, OR 1 can/pouch loose leaf tobacco per week, OR <0.5 vape pods per day)  (2)   Alcohol Use denies(2)   Drug Use denies (2)              Allergies:  ·  NSAIDs : Sneezing, Swelling/Edema  ·  hydrocodone : Unknown  ·  oxycodone : Unknown  ·  propoxyphene : Unknown    Medications Prior to Admission:   The patient does not take any medications at home.    Objective:     Objective Information:      T   P  R  BP   MAP  SpO2   Value  36.7  83  18  154/87      96%  Date/Time 9/24 10:20 9/24 10:20 9/24 10:20 9/24 10:20    9/24 10:20  Range  (36.7C - 36.7C )  (83 - 83 )  (18 - 18 )  (154 - 154 )/ (87 - 87 )    (96% - 96% )    Physical Exam by System:    Constitutional: Well developed, awake/alert/oriented  x3, no distress, alert and cooperative   Eyes: clear sclera   ENMT: mucous membranes moist, no apparent injury,  no lesions seen   Head/Neck: Neck supple, no apparent injury   Respiratory/Thorax: Patent airways, CTAB, normal  breath sounds with good chest expansion, thorax symmetric   Cardiovascular: Regular rate and rhythm, no murmurs,  2+ equal pulses of the extremities, normal S 1and S 2   Gastrointestinal: mildly distended, soft, tender  right side, no rebound tenderness or guarding, no masses palpable, no organomegaly, +BS   Musculoskeletal: ROM intact, no joint swelling, normal  strength   Extremities: normal extremities, no cyanosis, no  edema, no contusions or wounds, no clubbing   Neurological: alert and oriented, intact motor response,  normal strength   Lymphatic: No significant lymphadenopathy   Psychological: Appropriate mood and behavior   Skin: warm and dry, no lesions, no rashes     Medications:    Medications:           Continuous Medications       --------------------------------    1. Dextrose 5% - Lactated Ringers Infusion:  1000  mL  IntraVenous  <Continuous>         Scheduled Medications       --------------------------------    1. Cefepime IV Piggy Back:  2  gram(s)  IntraVenous Piggyback  Every 8 Hours    2. Enoxaparin SubCutaneous:  40  mg  SubCutaneous  Every 24 Hours    3. metroNIDAZOLE (FLAGYL) 500 mg IVPB/ Premixed Soln 100 mL:  100  mL  IntraVenous Piggyback  Every 8 Hours    4. Nicotine 21 mg/ 24 hour TransDermal:  1  patch  TransDermal  Every 24 Hours         PRN Medications       --------------------------------    1. Morphine Injectable:  4  mg  IntraVenous Push  Every 4 Hours    2. Nicotine Transmucosal:  2  mg  Oral  Every 4 Hours    3. Ondansetron Injectable:  4  mg  IntraVenous Push  Every 6 Hours    4. Sodium Chloride 0.9% Injectable Flush:  10  mL  IntraVenous Flush  Every 8 Hours and as Needed        Recent Lab Results:    Results:    CBC: 9/24/2023 13:34              \     Hgb     /                              \     13.8       /  WBC  ----------------  Plt               13.4 H    ----------------    369              /     Hct     \                              /     42.1       \            RBC: 4.90     MCV: 86     Neutrophil %: 62.7      BMP: 9/23/2023 22:10  NA+        Cl-     BUN  /                         136    101    20  /  --------------------------------  Glucose                ---------------------------  122 H    K+     HCO3-   Creat \                         3.7  22    0.89  \  Calcium : 10.1     Anion Gap : 17      CMP: 9/24/2023 13:34  NA+        Cl-     BUN  /                         136    101    17  /  --------------------------------  Glucose                ---------------------------  100 H    K+     HCO3-   Creat \                         3.6    25    0.75  \           \  T Bili  /                    \  0.6  /  AST  x ---- x ALT        24 x ---- x 35         /   Alk P   \               /  102  \  Calcium : 9.9     Anion Gap : 14     Albumin : 4.2     T Protein : 7.0             I have reviewed these laboratory results:    Lipase, Serum  23-Sep-2023 22:10:00      Result Value    Lipase, Serum  37      C Reactive Protein, Serum  23-Sep-2023 22:10:00      Result Value    C Reactive Protein, Serum  1.58   A     Sedimentation Rate, Erythrocyte  23-Sep-2023 22:10:00      Result Value    Sedimentation Rate, Erythrocyte  37   H     HCG, Beta Quantitative  23-Sep-2023 22:10:00      Result Value    HCG, Beta Quantitative  5   A       Radiology Results:    Results:        Impression:    Wall thickening and edema of segments of the distal ileum and  terminal ileum compatible with enteritis and patient's known  inflammatory bowel disease. There is also interval development of  diffuse pancolonic wall thickening and edema compatible with  pancolitis.     MACRO:  None     CT Abdomen and Pelvis with IV Contrast [Sep 24 2023 12:19AM]      Assessment and Plan:   Problem List:       Additional Dx:   Nasal turbinate hypertrophy:    Deviated nasal septum:    Nasal polyposis:    Chronic posterior ethmoidal sinusitis:    Chronic pansinusitis:        Medical History:   Nasal polyp, unspecified:     Assessment:    JEROME DILLON is a 53 year old Female with past medical history of Crohn's disease, chronic sinusitis  and nasal obstruction with bilateral sinus/nasal surgery who presented today with abdominal pain, nausea, vomiting, and diarrhea.    A/P:    1. Pancolitis  Suspected Crohn's Flare    admit to [facility]  observation  appreciate GI recommendation  start cefepime and flagyl  D5LR at 125cc/hr  continue morphine  NPO  CBC, CMP in am  UA    2. DVT Prophylaxis    SCDs  lovenox          Electronic Signatures:  Radha Clemons (APRN-CNP)  (Signed 24-Sep-2023 17:03)   Authored: History of Present Illness, Comorbidities,  Social History, Allergies, Medications Prior to Admission, Objective, Assessment  "and Plan, Note Completion      Last Updated: 24-Sep-2023 17:03 by Radha Clemons (APRN-CNP)    References:  1.  Data Referenced From \"Triage - ED\" 24-Sep-2023 10:20  2.  Data Referenced From \"Risk Screen - Adult Emergency\" 23-Sep-2023 21:38   "

## 2023-09-30 NOTE — H&P
History of Present Illness:   Pregnant/Lactating:  ·  Are You Pregnant no (1)   ·  Are You Currently Breastfeeding no (2)     HPI:    JEROME DILLON is a 53 year old Female with past medical history of Crohn's disease,  chronic sinusitis and nasal obstruction with bilateral sinus/nasal surgery who presented today with abdominal pain, nausea, vomiting, and diarrhea.  Patient reports over the past week or so she has  been experiencing inability to keep anything down.  She notes vomiting of nonbloody, non-coffee-ground emesis, 8 out of 10 sharp epigastric pain, and none tarry, nonbloody watery diarrhea too many times a day to count.  She admits to associated chills.   She reports she has a history of Crohn's disease and sees Dr. Parnell.  She reports he prescribed her Stelara but unfortunately she was unable to afford the medication and takes nothing for her Crohn's disease currently.    In the ED labs were performed and revealed white blood cell count 13.4 (14.5 yesterday), neutrophil count 8.42 (10.21 yesterday) a CT of the abdomen and pelvis was performed yesterday and revealed:    Wall thickening and edema of segments of the distal ileum and  terminal ileum compatible with enteritis and patient's known  inflammatory bowel disease. There is also interval development of  diffuse pancolonic wall thickening and edema compatible with  pancolitis.      Patient was recommended to be admitted yesterday, but due to issues at home elected to be discharged and is back today with continued abdominal pain, nausea, vomiting, and unable to tolerate by mouth.  She arrived with acceptable vital signs and was given  IV fluids, Zofran, and morphine and admitted for further evaluation and treatment.      Past medical history: As above  Past surgical history: As above, cholecystectomy, tonsillectomy, hysterectomy  Social history: Smokes a pack of cigarettes per day, denies alcohol or illicit  drug use.  Lives with family.  Family  history: Father: Lung disease, no history of diagnosed Crohn's disease but reports her sister  has GI issues  A 10 point review of systems has been performed and is negative besides what is stated in HPI.    Social History:   Social History:  Smoking Status light user (uses <10 cig/day, OR <0.5 ppd, OR 1 can/pouch loose leaf tobacco per week, OR <0.5 vape pods per day)  (1)   Alcohol Use denies(1)   Drug Use denies (1)              Allergies:  ·  NSAIDs : Sneezing, Swelling/Edema  ·  hydrocodone : Unknown  ·  oxycodone : Unknown  ·  propoxyphene : Unknown    Medications Prior to Admission:   The patient does not take any medications at home.    Objective:     Objective Information:      T   P  R  BP   MAP  SpO2   Value  36.5  79  18  127/58   83  92%  Date/Time 9/26 7:36 9/26 7:36 9/26 4:13 9/26 7:36  9/26 7:36 9/26 7:36  Range  (36C - 36.7C )  (61 - 79 )  (18 - 18 )  (85 - 165 )/ (48 - 82 )  (63 - 116 )  (92% - 98% )      Pain reported at 9/25 15:37: 9 = Severe    Physical Exam by System:    Constitutional: Well developed, awake/alert/oriented  x3, no distress, alert and cooperative   Eyes: clear sclera   ENMT: mucous membranes moist, no apparent injury,  no lesions seen   Head/Neck: Neck supple, no apparent injury   Respiratory/Thorax: Patent airways, CTAB, normal  breath sounds with good chest expansion, thorax symmetric   Cardiovascular: Regular rate and rhythm, no murmurs,  2+ equal pulses of the extremities, normal S 1and S 2   Gastrointestinal: mildly distended, soft, tender  right side, no rebound tenderness or guarding, no masses palpable, no organomegaly, +BS   Musculoskeletal: ROM intact, no joint swelling, normal  strength   Extremities: normal extremities, no cyanosis, no  edema, no contusions or wounds, no clubbing   Neurological: alert and oriented, intact motor response,  normal strength   Lymphatic: No significant lymphadenopathy   Psychological: Appropriate mood and behavior   Skin: warm and dry,  no lesions, no rashes     Medications:    Medications:          Continuous Medications       --------------------------------    1. Dextrose 5% - Lactated Ringers Infusion:  1000  mL  IntraVenous  <Continuous>         Scheduled Medications       --------------------------------    1. Budesonide:  9  mg  Oral  Daily    2. Cefepime IV Piggy Back:  2  gram(s)  IntraVenous Piggyback  Every 8 Hours    3. Enoxaparin SubCutaneous:  40  mg  SubCutaneous  Every 24 Hours    4. Influenza Virus QUADRIVALENT (Inactive) ADULT Vaccine:  0.5  mL  IntraMuscular  Once    5. metroNIDAZOLE (FLAGYL) 500 mg IVPB/ Premixed Soln 100 mL:  100  mL  IntraVenous Piggyback  Every 8 Hours    6. Nicotine 21 mg/ 24 hour TransDermal:  1  patch  TransDermal  Every 24 Hours    7. Pantoprazole Injectable:  40  mg  IntraVenous Push  Every 24 Hours         PRN Medications       --------------------------------    1. Morphine Injectable:  4  mg  IntraVenous Push  Every 4 Hours    2. Nicotine Transmucosal:  2  mg  Oral  Every 4 Hours    3. Ondansetron Injectable:  4  mg  IntraVenous Push  Every 6 Hours    4. Sodium Chloride 0.9% Injectable Flush:  10  mL  IntraVenous Flush  Every 8 Hours and as Needed        Recent Lab Results:    Results:    CBC: 9/25/2023 06:07              \     Hgb     /                              \     12.0       /  WBC  ----------------  Plt               10.7       ----------------    325              /     Hct     \                              /     36.6       \            RBC: 4.14     MCV: 88     Neutrophil %: 62.7      BMP: 9/23/2023 22:10  NA+        Cl-     BUN  /                         136    101    20  /  --------------------------------  Glucose                ---------------------------  122 H    K+     HCO3-   Creat \                         3.7  22    0.89  \  Calcium : 10.1     Anion Gap : 11      CMP: 9/25/2023 06:07  NA+        Cl-     BUN  /                         139    106    13   "/  --------------------------------  Glucose                ---------------------------  121 H    K+     HCO3-   Creat \                         3.6    28    0.84  \           \  T Bili  /                    \  0.6  /  AST  x ---- x ALT        27 x ---- x 34         /  Alk P   \               /  84  \  Calcium : 9.2     Anion Gap : 9 L    Albumin : 3.3 L     T Protein : 5.5 L          Assessment and Plan:   Assessment:    JEROME DILLON is a 53 year old Female with past medical history of Crohn's disease, chronic sinusitis  and nasal obstruction with bilateral sinus/nasal surgery who presented today with abdominal pain, nausea, vomiting, and diarrhea.    A/P:    1. Pancolitis  Suspected Crohn's Flare    admit to [facility]  observation  appreciate GI recommendation  start cefepime and flagyl  D5LR at 125cc/hr  continue morphine  NPO  CBC, CMP in am  UA    2. DVT Prophylaxis    SCDs  lovenox          Electronic Signatures:  Surjit Rodriguez ()  (Signed 26-Sep-2023 09:22)   Authored: History of Present Illness, Comorbidities,  Social History, Allergies, Medications Prior to Admission, Objective, Assessment and Plan, Note Completion      Last Updated: 26-Sep-2023 09:22 by Surjit Rodriguez ()    References:  1.  Data Referenced From \"Patient Profile - Adult v2\" 24-Sep-2023 18:48  2.  Data Referenced From \"History and Physical\" 24-Sep-2023 16:41   "

## 2023-10-09 ENCOUNTER — OFFICE VISIT (OUTPATIENT)
Dept: GASTROENTEROLOGY | Facility: CLINIC | Age: 53
End: 2023-10-09
Payer: COMMERCIAL

## 2023-10-09 ENCOUNTER — LAB (OUTPATIENT)
Dept: LAB | Facility: LAB | Age: 53
End: 2023-10-09
Payer: COMMERCIAL

## 2023-10-09 ENCOUNTER — TELEPHONE (OUTPATIENT)
Dept: GASTROENTEROLOGY | Facility: EXTERNAL LOCATION | Age: 53
End: 2023-10-09

## 2023-10-09 VITALS
SYSTOLIC BLOOD PRESSURE: 134 MMHG | WEIGHT: 174 LBS | HEART RATE: 89 BPM | HEIGHT: 61 IN | BODY MASS INDEX: 32.85 KG/M2 | DIASTOLIC BLOOD PRESSURE: 89 MMHG

## 2023-10-09 DIAGNOSIS — R10.84 GENERALIZED ABDOMINAL PAIN: Primary | ICD-10-CM

## 2023-10-09 DIAGNOSIS — K76.0 FATTY LIVER: ICD-10-CM

## 2023-10-09 DIAGNOSIS — R12 HEARTBURN: ICD-10-CM

## 2023-10-09 DIAGNOSIS — R19.7 DIARRHEA, UNSPECIFIED TYPE: ICD-10-CM

## 2023-10-09 DIAGNOSIS — Z11.59 ENCOUNTER FOR SCREENING FOR OTHER VIRAL DISEASES: ICD-10-CM

## 2023-10-09 DIAGNOSIS — K52.9 ILEITIS: ICD-10-CM

## 2023-10-09 LAB
CRP SERPL-MCNC: 0.99 MG/DL
HBV CORE AB SER QL: NONREACTIVE
HBV SURFACE AB SER-ACNC: <3.1 MIU/ML
HCV AB SER QL: NONREACTIVE

## 2023-10-09 PROCEDURE — 99215 OFFICE O/P EST HI 40 MIN: CPT | Performed by: STUDENT IN AN ORGANIZED HEALTH CARE EDUCATION/TRAINING PROGRAM

## 2023-10-09 PROCEDURE — 83993 ASSAY FOR CALPROTECTIN FECAL: CPT

## 2023-10-09 PROCEDURE — 83883 ASSAY NEPHELOMETRY NOT SPEC: CPT

## 2023-10-09 PROCEDURE — 82657 ENZYME CELL ACTIVITY: CPT

## 2023-10-09 PROCEDURE — 86140 C-REACTIVE PROTEIN: CPT

## 2023-10-09 PROCEDURE — 86704 HEP B CORE ANTIBODY TOTAL: CPT

## 2023-10-09 PROCEDURE — 36415 COLL VENOUS BLD VENIPUNCTURE: CPT

## 2023-10-09 PROCEDURE — 86706 HEP B SURFACE ANTIBODY: CPT

## 2023-10-09 PROCEDURE — 86481 TB AG RESPONSE T-CELL SUSP: CPT

## 2023-10-09 PROCEDURE — 86803 HEPATITIS C AB TEST: CPT

## 2023-10-09 PROCEDURE — 83789 MASS SPECTROMETRY QUAL/QUAN: CPT

## 2023-10-09 PROCEDURE — 87340 HEPATITIS B SURFACE AG IA: CPT

## 2023-10-09 PROCEDURE — 3008F BODY MASS INDEX DOCD: CPT | Performed by: STUDENT IN AN ORGANIZED HEALTH CARE EDUCATION/TRAINING PROGRAM

## 2023-10-09 RX ORDER — SODIUM CHLORIDE 9 MG/ML
20 INJECTION, SOLUTION INTRAVENOUS CONTINUOUS
Status: CANCELLED | OUTPATIENT
Start: 2023-10-09

## 2023-10-09 RX ORDER — BUDESONIDE 9 MG/1
9 TABLET, EXTENDED RELEASE ORAL DAILY
COMMUNITY
Start: 2023-09-27 | End: 2023-10-31 | Stop reason: WASHOUT

## 2023-10-09 RX ORDER — SODIUM CHLORIDE 0.9 % (FLUSH) 0.9 %
10 SYRINGE (ML) INJECTION AS NEEDED
Status: CANCELLED | OUTPATIENT
Start: 2023-10-09

## 2023-10-09 RX ORDER — BUDESONIDE 3 MG/1
9 CAPSULE, COATED PELLETS ORAL EVERY MORNING
Qty: 90 CAPSULE | Refills: 11 | Status: ON HOLD | OUTPATIENT
Start: 2023-10-09 | End: 2024-03-27 | Stop reason: ALTCHOICE

## 2023-10-09 RX ORDER — SODIUM CHLORIDE 0.9 % (FLUSH) 0.9 %
10 SYRINGE (ML) INJECTION EVERY 12 HOURS
Status: CANCELLED | OUTPATIENT
Start: 2023-10-09

## 2023-10-09 NOTE — PATIENT INSTRUCTIONS
1-we need to check some blood and stool test  2-we need to schedule for an upper endoscopy and a colonoscopy, please follow a lactose-free low fiber diet, please avoid NSAIDs and smoking, please stay updated with your vaccinations  3-for heartburn  please elevate the head of the bed 6 to 8 inches, have an early dinner at least 3 hours before sleep, have small frequent meals (5 small meals per day), avoid lying down after meals, avoid spices juices soda tomatoes yobani oranges caffeine, other caffeinated beverages, chocolate alcohol NSAIDs smoking fatty food peppermint among others  4-for the inflammation in your colon please continue taking budesonide 9 mg daily

## 2023-10-09 NOTE — PROGRESS NOTES
10/15/21  54yo  lady with history of cholecystectomy, hysterectomy presenting with chronic history of lower abdominal pain, crampy, positive at night, radiating to her back, not relieved by passing gas or bowel movements, associated with nausea and soft stools up to 20 times per day with mucus, started 10 years ago, occurring once every 2 to 3 years and lasting for few weeks a few months. The patient had a CAT scan of the abdomen in July 2020 showing wall thickening of the terminal ileum, however she mentioned that she was never started on treatment or received an accurate diagnosis. She denies any fever chills vomiting dysphagia odynophagia heartburn melena hematochezia hematemesis.    2/11/2022  Patient will need for follow-up, feeling significantly better after starting budesonide taper, denies any episodes of abdominal pain or diarrhea    5/20/22  patient s here for follow up, symptoms recurred 5 d ago, having lower abd pain stabbing, significant watery diarrhea, nausea and vomiting, tolerating liquids, but vomiting solids    10/2023  Had 2 flare ups in 2022 as per her, Patient was recently admitted to [facility] on 9/2023 with severe abdominal pain and watery diarrhea up to 15 or 20 times a day, watery, stool tests unremarkable, CAT scan 9/2023 showing ileal and pancolonic thickening, got partially better on budesonide 9 mg daily and antibiotics    EGD 7/2020 at [facility] gastritis, bx wnl in stomach and duodenum  EGD 10/2023 small HH, mild esophagitis gastritis duodenitis   VCE 11-29-21 showing ileal erosions see official report  VCE 11/2023 patchy small bowel erythema   Colonoscopy 7/2020 at [facility] normal TI, bx wnl in TI and random colon   Colonoscopy 11/2023 TI erosions , H, ac ta, bx unremarkable   Family history reviewed, not pertinent to chief complaint  Normally has 1-2 bowel movements per day, normal, during flare of her symptoms she gets up to 20 times per day soft mucousy stool  Denies NSAIDs, alcohol  marijuana drug use, stopped smoking         The note was created using voice recognition transcription software. Despite proofreading, unintentional typographical errors may be present. Please contact the GI office with any questions or concerns.     Current Medications: reviewed    A 10 point review of system is negative except for what is mentioned in the HPI    Follow up with GI was advised       Vital Signs: Reviewed    Physical Exam:  General: no apparent distress, pleasant and cooperative  Skin:  Warm and dry, no jaundice  HEENT: No scleral icterus, no conjunctival pallor, normocephalic, atraumatic, mucous membranes moist  Neck:  atraumatic, trachea midline, no JVD  Chest:  decreased air entry to auscultation bilaterally. No wheezes, rales, or rhonchi  CV:  Regular rate and rhythm.  Positive S1/S2  Abdomen: no distension, +BS, soft, non-tender to palpation, no rebound tenderness, no guarding, no rigidity, no discernible ascites   Extremities: no lower extremity edema, Chronic pigmentary changes, no cyanosis  Neurological:  A&Ox3 , no asterixis  Psychiatric: cooperative     Investigations:  Labs, radiological imaging and cardiac work up were reviewed    1- born in 1970 screen for hcv     2-BMI 30, lifestyle modifications advised    3- HCM, colonoscopy as above, will repeat according to findings below     4-Chronic intermittent abdominal pain and soft stools described as above suspicious for  Crohn's:    CAT scan of the abdomen in July 2020 showing wall thickening of the terminal ileum see official report, records of EGD and colonoscopy as above, smoking cessation advised,   10/28/21 discussed with radiology Dr Gastelum, CT from 6/2020 highly suspicious for Crohn's, recent CTE 10/20/21 with resolved findings   11-29-21 VCE showing ileal erosions see official report  12/2/21 discussed with patient, after discussing with radiology and reviewing vce results, findings suggest ileal Crohn's disease, started budesonide  with current significant improvement and resolution of symptoms    5/20/22 abd pain and significant diarrhea recurred,  9/2023 admitted to [facility] with severe abdominal pain and diarrhea, CAT scan 9/2023 showing ileal and pancolonic thickening, egd and colonoscoopy showing non specific findings as above, but was already on budesonide, will require capsule endoscopy for further characterization of small bowel disease, partially better on budesonide, will plan for Stelara    patient was initially planned for it in 2022 however wanted to hold because she was concerned about COVID-19 and possible side effects, continue budesonide, check crp, stool calprotectin, check TPMT, quantiferon, HBV serology, check ebv serology in case thiopurines are required,  lactose free low fiber diet, avoid NSAIDs and smoking, Dexa scan, will need to stay up to date with vaccinations (Pneumovax and zoster before starting therapy, HPV vaccination, avoid live vaccines once on immunosuppression), if started on anti-tnf will need yearly dermatology exam, annual Pap smear in women, will follow for treatment, markers (CRP, calprotectin) q3 months after starting treatment, will reassess after 6 months after treatment by scope    4- fatty liver, healthy lifestyle advised

## 2023-10-09 NOTE — H&P (VIEW-ONLY)
10/15/21  52yo  lady with history of cholecystectomy, hysterectomy presenting with chronic history of lower abdominal pain, crampy, positive at night, radiating to her back, not relieved by passing gas or bowel movements, associated with nausea and soft stools up to 20 times per day with mucus, started 10 years ago, occurring once every 2 to 3 years and lasting for few weeks a few months. The patient had a CAT scan of the abdomen in July 2020 showing wall thickening of the terminal ileum, however she mentioned that she was never started on treatment or received an accurate diagnosis. She denies any fever chills vomiting dysphagia odynophagia heartburn melena hematochezia hematemesis.    2/11/2022  Patient will need for follow-up, feeling significantly better after starting budesonide taper, denies any episodes of abdominal pain or diarrhea    5/20/22  patient s here for follow up, symptoms recurred 5 d ago, having lower abd pain stabbing, significant watery diarrhea, nausea and vomiting, tolerating liquids, but vomiting solids    10/2023  Had 2 flare ups in 2022 as per her, Patient was recently admitted to [facility] on 9/2023 with severe abdominal pain and watery diarrhea up to 15 or 20 times a day, watery, stool tests unremarkable, CAT scan 9/2023 showing ileal and pancolonic thickening, got partially better on budesonide 9 mg daily and antibiotics    EGD 7/2020 at  gastritis, bx wnl in stomach and duodenum  11-29-21 VCE showing ileal erosions see official report  Colonoscopy 7/2020 at  normal TI, bx wnl in TI and random colon   Family history reviewed, not pertinent to chief complaint  Normally has 1-2 bowel movements per day, normal, during flare of her symptoms she gets up to 20 times per day soft mucousy stool  Denies NSAIDs, alcohol marijuana drug use, stopped smoking         The note was created using voice recognition transcription software. Despite proofreading, unintentional typographical  errors may be present. Please contact the GI office with any questions or concerns.     Current Medications: reviewed    A 10 point review of system is negative except for what is mentioned in the HPI    Follow up with GI was advised       Vital Signs: Reviewed    Physical Exam:  General: no apparent distress, pleasant and cooperative  Skin:  Warm and dry, no jaundice  HEENT: No scleral icterus, no conjunctival pallor, normocephalic, atraumatic, mucous membranes moist  Neck:  atraumatic, trachea midline, no JVD  Chest:  decreased air entry to auscultation bilaterally. No wheezes, rales, or rhonchi  CV:  Regular rate and rhythm.  Positive S1/S2  Abdomen: no distension, +BS, soft, non-tender to palpation, no rebound tenderness, no guarding, no rigidity, no discernible ascites   Extremities: no lower extremity edema, Chronic pigmentary changes, no cyanosis  Neurological:  A&Ox3 , no asterixis  Psychiatric: cooperative     Investigations:  Labs, radiological imaging and cardiac work up were reviewed    1- born in 1970 screen for hcv     2-BMI 30, lifestyle modifications advised    3- HCM, colonoscopy as above, will repeat according to findings below     4-Chronic intermittent abdominal pain and soft stools described as above suspicious for  Crohn's:    CAT scan of the abdomen in July 2020 showing wall thickening of the terminal ileum see official report, records of EGD and colonoscopy as above, smoking cessation advised,   10/28/21 discussed with radiology Dr Gastelum, CT from 6/2020 highly suspicious for Crohn's, recent CTE 10/20/21 with resolved findings   11-29-21 VCE showing ileal erosions see official report  12/2/21 discussed with patient, after discussing with radiology and reviewing vce results, findings suggest ileal Crohn's disease, started budesonide with current significant improvement and resolution of symptoms, will consider mesalamine     5/20/22 abd pain and significant diarrhea recurred, Did not  use9/2023 admitted to [facility] with severe abdominal pain and diarrhea, CAT scan 9/2023 showing ileal and pancolonic thickening, partially better on budesonide, will plan for Stelara, patient was initially planned for it in 2022 however wanted to hold because she was concerned about COVID-19 and possible side effects, continue budesonide, check crp, stool calprotectin, check TPMT, quantiferon, HBV serology, check ebv serology in case thiopurines are required,  lactose free low fiber diet, avoid NSAIDs and smoking, Dexa scan, will need to stay up to date with vaccinations (Pneumovax and zoster before starting therapy, HPV vaccination, avoid live vaccines once on immunosuppression), if started on anti-tnf will need yearly dermatology exam, annual Pap smear in women, will follow for treatment, markers (CRP, calprotectin) q3 months after starting treatment, will reassess after 6 months after treatment by scope    4- fatty liver, healthy lifestyle advised

## 2023-10-10 LAB — HBV SURFACE AG SERPL QL IA: NONREACTIVE

## 2023-10-11 ENCOUNTER — HOSPITAL ENCOUNTER (OUTPATIENT)
Dept: GASTROENTEROLOGY | Facility: EXTERNAL LOCATION | Age: 53
Discharge: HOME | End: 2023-10-11
Payer: COMMERCIAL

## 2023-10-11 VITALS
RESPIRATION RATE: 20 BRPM | HEIGHT: 61 IN | DIASTOLIC BLOOD PRESSURE: 68 MMHG | WEIGHT: 170 LBS | OXYGEN SATURATION: 98 % | SYSTOLIC BLOOD PRESSURE: 104 MMHG | TEMPERATURE: 97.3 F | HEART RATE: 84 BPM | BODY MASS INDEX: 32.1 KG/M2

## 2023-10-11 DIAGNOSIS — K29.00 ACUTE GASTRITIS WITHOUT HEMORRHAGE, UNSPECIFIED GASTRITIS TYPE: ICD-10-CM

## 2023-10-11 DIAGNOSIS — R10.9 ABDOMINAL CRAMPING: Primary | ICD-10-CM

## 2023-10-11 DIAGNOSIS — K50.919 CROHN'S DISEASE WITH COMPLICATION, UNSPECIFIED GASTROINTESTINAL TRACT LOCATION (MULTI): ICD-10-CM

## 2023-10-11 LAB
NIL(NEG) CONTROL SPOT COUNT: NORMAL
PANEL A SPOT COUNT: 0
PANEL B SPOT COUNT: 0
POS CONTROL SPOT COUNT: NORMAL
T-SPOT. TB INTERPRETATION: NEGATIVE

## 2023-10-11 PROCEDURE — 88305 TISSUE EXAM BY PATHOLOGIST: CPT

## 2023-10-11 PROCEDURE — 88305 TISSUE EXAM BY PATHOLOGIST: CPT | Performed by: STUDENT IN AN ORGANIZED HEALTH CARE EDUCATION/TRAINING PROGRAM

## 2023-10-11 PROCEDURE — 43239 EGD BIOPSY SINGLE/MULTIPLE: CPT | Performed by: STUDENT IN AN ORGANIZED HEALTH CARE EDUCATION/TRAINING PROGRAM

## 2023-10-11 ASSESSMENT — PAIN - FUNCTIONAL ASSESSMENT
PAIN_FUNCTIONAL_ASSESSMENT: 0-10
PAIN_FUNCTIONAL_ASSESSMENT: 0-10

## 2023-10-11 ASSESSMENT — COLUMBIA-SUICIDE SEVERITY RATING SCALE - C-SSRS
1. IN THE PAST MONTH, HAVE YOU WISHED YOU WERE DEAD OR WISHED YOU COULD GO TO SLEEP AND NOT WAKE UP?: NO
6. HAVE YOU EVER DONE ANYTHING, STARTED TO DO ANYTHING, OR PREPARED TO DO ANYTHING TO END YOUR LIFE?: NO
2. HAVE YOU ACTUALLY HAD ANY THOUGHTS OF KILLING YOURSELF?: NO

## 2023-10-11 ASSESSMENT — PAIN SCALES - GENERAL
PAINLEVEL_OUTOF10: 0 - NO PAIN
PAINLEVEL_OUTOF10: 0 - NO PAIN

## 2023-10-11 NOTE — DISCHARGE INSTRUCTIONS

## 2023-10-12 LAB
CALPROTECTIN STL-MCNT: 8 UG/G
TPMT BLD-CCNC: 25 U/ML (ref 24–44)

## 2023-10-12 RX ORDER — DIPHENHYDRAMINE HCL 25 MG
25 CAPSULE ORAL ONCE
Status: CANCELLED | OUTPATIENT
Start: 2023-10-12

## 2023-10-12 RX ORDER — ALBUTEROL SULFATE 0.83 MG/ML
3 SOLUTION RESPIRATORY (INHALATION) AS NEEDED
OUTPATIENT
Start: 2023-10-12

## 2023-10-12 RX ORDER — FAMOTIDINE 10 MG/ML
20 INJECTION INTRAVENOUS ONCE AS NEEDED
OUTPATIENT
Start: 2023-10-12

## 2023-10-12 RX ORDER — EPINEPHRINE 0.3 MG/.3ML
0.3 INJECTION SUBCUTANEOUS EVERY 5 MIN PRN
OUTPATIENT
Start: 2023-10-12

## 2023-10-12 RX ORDER — ACETAMINOPHEN 325 MG/1
650 TABLET ORAL ONCE
Status: CANCELLED | OUTPATIENT
Start: 2023-10-12

## 2023-10-12 RX ORDER — DIPHENHYDRAMINE HYDROCHLORIDE 50 MG/ML
50 INJECTION INTRAMUSCULAR; INTRAVENOUS AS NEEDED
OUTPATIENT
Start: 2023-10-12

## 2023-10-13 ENCOUNTER — TELEPHONE (OUTPATIENT)
Dept: GASTROENTEROLOGY | Facility: CLINIC | Age: 53
End: 2023-10-13
Payer: COMMERCIAL

## 2023-10-13 LAB
A2 MACROGLOB SERPL-MCNC: 217 MG/DL (ref 110–276)
ALT SERPL W P-5'-P-CCNC: 45 IU/L (ref 0–40)
APO A-I SERPL-MCNC: 157 MG/DL (ref 116–209)
AST SERPL W P-5'-P-CCNC: 19 IU/L (ref 0–40)
BILIRUB SERPL-MCNC: 0.2 MG/DL (ref 0–1.2)
CHOLEST SERPL-MCNC: 221 MG/DL (ref 100–199)
FIBROSIS SCORING:: ABNORMAL
FIBROSIS STAGE SERPL QL: ABNORMAL
GGT SERPL-CCNC: 86 IU/L (ref 0–60)
GLUCOSE SERPL-MCNC: 118 MG/DL (ref 70–99)
HAPTOGLOB SERPL-MCNC: 285 MG/DL (ref 33–346)
LABORATORY COMMENT REPORT: ABNORMAL
LIVER FIBR SCORE SERPL CALC.FIBROSURE: 0.1 (ref 0–0.21)
LIVER STEATOSIS GRADE SERPL QL: ABNORMAL
LIVER STEATOSIS SCORE SERPL: 0.78 (ref 0–0.4)
NASH GRADE SERPL QL: ABNORMAL
NASH INTERPRETATION SERPL-IMP: ABNORMAL
NASH SCORE SERPL: 0.34 (ref 0–0.25)
NASH SCORING: ABNORMAL
STEATOSIS SCORING: ABNORMAL
TEST PERFORMANCE INFO SPEC: ABNORMAL
TEST PERFORMANCE INFO SPEC: ABNORMAL
TRIGL SERPL-MCNC: 176 MG/DL (ref 0–149)

## 2023-10-13 NOTE — TELEPHONE ENCOUNTER
Pt calling in stating she is taking budesonide and Uceris together and is unable to drive unable to focus, and experience some blurring,

## 2023-10-20 ENCOUNTER — OFFICE VISIT (OUTPATIENT)
Dept: PRIMARY CARE | Facility: CLINIC | Age: 53
End: 2023-10-20
Payer: COMMERCIAL

## 2023-10-20 ENCOUNTER — LAB (OUTPATIENT)
Dept: LAB | Facility: LAB | Age: 53
End: 2023-10-20
Payer: COMMERCIAL

## 2023-10-20 VITALS
SYSTOLIC BLOOD PRESSURE: 123 MMHG | HEART RATE: 82 BPM | OXYGEN SATURATION: 96 % | TEMPERATURE: 98 F | WEIGHT: 164 LBS | DIASTOLIC BLOOD PRESSURE: 83 MMHG | BODY MASS INDEX: 30.99 KG/M2

## 2023-10-20 DIAGNOSIS — K76.0 FATTY LIVER: ICD-10-CM

## 2023-10-20 DIAGNOSIS — Z71.6 ENCOUNTER FOR SMOKING CESSATION COUNSELING: ICD-10-CM

## 2023-10-20 DIAGNOSIS — K50.919 CROHN'S DISEASE WITH COMPLICATION, UNSPECIFIED GASTROINTESTINAL TRACT LOCATION (MULTI): ICD-10-CM

## 2023-10-20 DIAGNOSIS — F17.200 NICOTINE DEPENDENCE, UNCOMPLICATED, UNSPECIFIED NICOTINE PRODUCT TYPE: ICD-10-CM

## 2023-10-20 DIAGNOSIS — K50.919 CROHN'S DISEASE WITH COMPLICATION, UNSPECIFIED GASTROINTESTINAL TRACT LOCATION (MULTI): Primary | ICD-10-CM

## 2023-10-20 LAB
ALBUMIN SERPL BCP-MCNC: 4.2 G/DL (ref 3.4–5)
ALP SERPL-CCNC: 81 U/L (ref 33–110)
ALT SERPL W P-5'-P-CCNC: 21 U/L (ref 7–45)
ANION GAP SERPL CALC-SCNC: 14 MMOL/L (ref 10–20)
AST SERPL W P-5'-P-CCNC: 17 U/L (ref 9–39)
BASOPHILS # BLD AUTO: 0.06 X10*3/UL (ref 0–0.1)
BASOPHILS NFR BLD AUTO: 0.5 %
BILIRUB SERPL-MCNC: 0.6 MG/DL (ref 0–1.2)
BUN SERPL-MCNC: 10 MG/DL (ref 6–23)
CALCIUM SERPL-MCNC: 9.8 MG/DL (ref 8.6–10.3)
CHLORIDE SERPL-SCNC: 103 MMOL/L (ref 98–107)
CO2 SERPL-SCNC: 26 MMOL/L (ref 21–32)
CREAT SERPL-MCNC: 0.72 MG/DL (ref 0.5–1.05)
CRP SERPL-MCNC: 0.55 MG/DL
EOSINOPHIL # BLD AUTO: 0.23 X10*3/UL (ref 0–0.7)
EOSINOPHIL NFR BLD AUTO: 1.8 %
ERYTHROCYTE [DISTWIDTH] IN BLOOD BY AUTOMATED COUNT: 13.4 % (ref 11.5–14.5)
GFR SERPL CREATININE-BSD FRML MDRD: >90 ML/MIN/1.73M*2
GLUCOSE SERPL-MCNC: 117 MG/DL (ref 74–99)
HCT VFR BLD AUTO: 39.7 % (ref 36–46)
HGB BLD-MCNC: 12.6 G/DL (ref 12–16)
IMM GRANULOCYTES # BLD AUTO: 0.04 X10*3/UL (ref 0–0.7)
IMM GRANULOCYTES NFR BLD AUTO: 0.3 % (ref 0–0.9)
LABORATORY COMMENT REPORT: NORMAL
LYMPHOCYTES # BLD AUTO: 5.1 X10*3/UL (ref 1.2–4.8)
LYMPHOCYTES NFR BLD AUTO: 39.1 %
MCH RBC QN AUTO: 28.5 PG (ref 26–34)
MCHC RBC AUTO-ENTMCNC: 31.7 G/DL (ref 32–36)
MCV RBC AUTO: 90 FL (ref 80–100)
MONOCYTES # BLD AUTO: 0.59 X10*3/UL (ref 0.1–1)
MONOCYTES NFR BLD AUTO: 4.5 %
NEUTROPHILS # BLD AUTO: 7.03 X10*3/UL (ref 1.2–7.7)
NEUTROPHILS NFR BLD AUTO: 53.8 %
NRBC BLD-RTO: 0 /100 WBCS (ref 0–0)
PATH REPORT.FINAL DX SPEC: NORMAL
PATH REPORT.GROSS SPEC: NORMAL
PATH REPORT.TOTAL CANCER: NORMAL
PLATELET # BLD AUTO: 376 X10*3/UL (ref 150–450)
PMV BLD AUTO: 11.5 FL (ref 7.5–11.5)
POTASSIUM SERPL-SCNC: 3.7 MMOL/L (ref 3.5–5.3)
PROT SERPL-MCNC: 6.6 G/DL (ref 6.4–8.2)
RBC # BLD AUTO: 4.42 X10*6/UL (ref 4–5.2)
SODIUM SERPL-SCNC: 139 MMOL/L (ref 136–145)
WBC # BLD AUTO: 13.1 X10*3/UL (ref 4.4–11.3)

## 2023-10-20 PROCEDURE — 90471 IMMUNIZATION ADMIN: CPT | Performed by: NURSE PRACTITIONER

## 2023-10-20 PROCEDURE — 87340 HEPATITIS B SURFACE AG IA: CPT

## 2023-10-20 PROCEDURE — 99214 OFFICE O/P EST MOD 30 MIN: CPT | Performed by: NURSE PRACTITIONER

## 2023-10-20 PROCEDURE — 3008F BODY MASS INDEX DOCD: CPT | Performed by: NURSE PRACTITIONER

## 2023-10-20 PROCEDURE — 86803 HEPATITIS C AB TEST: CPT

## 2023-10-20 PROCEDURE — 80053 COMPREHEN METABOLIC PANEL: CPT

## 2023-10-20 PROCEDURE — 90682 RIV4 VACC RECOMBINANT DNA IM: CPT | Performed by: NURSE PRACTITIONER

## 2023-10-20 PROCEDURE — 36415 COLL VENOUS BLD VENIPUNCTURE: CPT

## 2023-10-20 PROCEDURE — 85025 COMPLETE CBC W/AUTO DIFF WBC: CPT

## 2023-10-20 PROCEDURE — 86481 TB AG RESPONSE T-CELL SUSP: CPT

## 2023-10-20 PROCEDURE — 86140 C-REACTIVE PROTEIN: CPT

## 2023-10-20 RX ORDER — IBUPROFEN 200 MG
1 TABLET ORAL EVERY 24 HOURS
Qty: 30 PATCH | Refills: 2 | Status: CANCELLED | OUTPATIENT
Start: 2023-10-20 | End: 2023-11-19

## 2023-10-20 RX ORDER — NICOTINE 21-14-7MG
1 KIT TRANSDERMAL EVERY 24 HOURS
Qty: 30 EACH | Refills: 1 | Status: ON HOLD | OUTPATIENT
Start: 2023-10-20 | End: 2024-03-27 | Stop reason: ALTCHOICE

## 2023-10-20 ASSESSMENT — ENCOUNTER SYMPTOMS
NAUSEA: 0
DIARRHEA: 1
FREQUENCY: 0
BLOOD IN STOOL: 0
VOMITING: 0

## 2023-10-20 ASSESSMENT — PAIN SCALES - GENERAL: PAINLEVEL: 0-NO PAIN

## 2023-10-20 ASSESSMENT — PATIENT HEALTH QUESTIONNAIRE - PHQ9
2. FEELING DOWN, DEPRESSED OR HOPELESS: NOT AT ALL
1. LITTLE INTEREST OR PLEASURE IN DOING THINGS: NOT AT ALL
SUM OF ALL RESPONSES TO PHQ9 QUESTIONS 1 AND 2: 0

## 2023-10-20 NOTE — PROGRESS NOTES
Subjective   Patient ID: Elena Michelle is a 53 y.o. female who presents for Follow-up.    HPI     Patient presents to clinic for follow-up and medication refills.  Patient was admitted to [facility] September 24 and discharged September 27, 2023 with a diagnosis of Crohn's flare.  Patient has had follow-up appointment with gastroenterologist Dr. Ortega has had colonoscopy in EGD on 10/11/2023.  Patient states Stelara was recommended by Dr. Ortega but she has concerns about starting the medication.  She states he recommended that she has immunizations updated.    Influenza vaccine given today she will return to clinic for shingles vaccine she declines COVID-vaccine.  Tdap last received 11/14/2014.    Patient states she continues to have diarrhea she states she has been eating mostly soft foods.  Denies fevers chills urinary symptoms.    Has been successful with smoking cessation using nicotine patch.        Review of Systems   Gastrointestinal:  Positive for diarrhea. Negative for blood in stool, nausea and vomiting.   Genitourinary:  Negative for frequency and urgency.   All other systems reviewed and are negative.      Objective   /83 (BP Location: Right arm, Patient Position: Sitting, BP Cuff Size: Adult)   Pulse 82   Temp 36.7 °C (98 °F)   Wt 74.4 kg (164 lb)   SpO2 96%   BMI 30.99 kg/m²     Physical Exam  Vitals reviewed.   Constitutional:       Appearance: Normal appearance. She is not ill-appearing.   Cardiovascular:      Rate and Rhythm: Normal rate and regular rhythm.   Pulmonary:      Effort: Pulmonary effort is normal.      Breath sounds: Normal breath sounds.   Abdominal:      General: Bowel sounds are normal.      Palpations: Abdomen is soft.      Tenderness: There is no abdominal tenderness. There is no guarding.      Comments: Very active bowel sounds.   Musculoskeletal:         General: Normal range of motion.   Skin:     General: Skin is warm and dry.   Neurological:      Mental Status:  She is alert and oriented to person, place, and time.         Assessment/Plan   Problem List Items Addressed This Visit       Crohn disease (CMS/HCC) - Primary    Fatty liver    Relevant Orders    Comprehensive Metabolic Panel  Continue medications as prescribed.  Follow-up with GI as scheduled     Other Visit Diagnoses       Nicotine dependence, uncomplicated, unspecified nicotine product type        Relevant Medications    nicotine 21-14-7 mg/24 hr patch, TD daily, sequential

## 2023-10-20 NOTE — PATIENT INSTRUCTIONS
Continue medications as prescribed.  Healthy diet and drink plenty of water.  Please have labs drawn.  Labs have are very been ordered by your gastroenterologist.  See MY CHART FOR RESULTS- If you have any questions please do not hesitate to notify our office.    Please return 1 week for first shingles vaccine.    Call office any new concerns.

## 2023-10-21 LAB
HBV SURFACE AG SERPL QL IA: NONREACTIVE
HCV AB SER QL: NONREACTIVE

## 2023-10-23 NOTE — TELEPHONE ENCOUNTER
Pt calling in stating she's still getting blurred vision and feeling foggy, pt states she's been taking the correct budesonide dosing since 10/13,  experiencing diarrhea 5-6 times a day, and would like results from procedure

## 2023-10-28 DIAGNOSIS — T18.9XXA FOREIGN BODY IN DIGESTIVE TRACT, INITIAL ENCOUNTER: Primary | ICD-10-CM

## 2023-10-30 ENCOUNTER — DOCUMENTATION (OUTPATIENT)
Dept: GASTROENTEROLOGY | Facility: HOSPITAL | Age: 53
End: 2023-10-30
Payer: COMMERCIAL

## 2023-11-01 ENCOUNTER — HOSPITAL ENCOUNTER (OUTPATIENT)
Dept: GASTROENTEROLOGY | Facility: HOSPITAL | Age: 53
Discharge: HOME | End: 2023-11-01
Payer: COMMERCIAL

## 2023-11-01 DIAGNOSIS — K52.9 ILEITIS: ICD-10-CM

## 2023-11-01 PROCEDURE — 2720000007 HC OR 272 NO HCPCS

## 2023-11-02 ENCOUNTER — APPOINTMENT (OUTPATIENT)
Dept: PRIMARY CARE | Facility: CLINIC | Age: 53
End: 2023-11-02
Payer: COMMERCIAL

## 2023-11-02 ENCOUNTER — CLINICAL SUPPORT (OUTPATIENT)
Dept: PRIMARY CARE | Facility: CLINIC | Age: 53
End: 2023-11-02
Payer: COMMERCIAL

## 2023-11-02 ENCOUNTER — HOSPITAL ENCOUNTER (OUTPATIENT)
Dept: RADIOLOGY | Facility: HOSPITAL | Age: 53
Discharge: HOME | End: 2023-11-02
Payer: COMMERCIAL

## 2023-11-02 VITALS
DIASTOLIC BLOOD PRESSURE: 70 MMHG | BODY MASS INDEX: 31.15 KG/M2 | WEIGHT: 165 LBS | SYSTOLIC BLOOD PRESSURE: 105 MMHG | OXYGEN SATURATION: 96 % | HEART RATE: 86 BPM | HEIGHT: 61 IN | TEMPERATURE: 97.3 F

## 2023-11-02 DIAGNOSIS — Z00.00 HEALTHCARE MAINTENANCE: Primary | ICD-10-CM

## 2023-11-02 DIAGNOSIS — T18.9XXA FOREIGN BODY IN DIGESTIVE TRACT, INITIAL ENCOUNTER: ICD-10-CM

## 2023-11-02 PROCEDURE — 90471 IMMUNIZATION ADMIN: CPT | Performed by: NURSE PRACTITIONER

## 2023-11-02 PROCEDURE — 74018 RADEX ABDOMEN 1 VIEW: CPT | Performed by: RADIOLOGY

## 2023-11-02 PROCEDURE — 74018 RADEX ABDOMEN 1 VIEW: CPT

## 2023-11-02 PROCEDURE — 90750 HZV VACC RECOMBINANT IM: CPT | Performed by: NURSE PRACTITIONER

## 2023-11-05 DIAGNOSIS — K52.9 ILEITIS: ICD-10-CM

## 2023-11-05 DIAGNOSIS — K50.919 CROHN'S DISEASE WITH COMPLICATION, UNSPECIFIED GASTROINTESTINAL TRACT LOCATION (MULTI): Primary | ICD-10-CM

## 2023-11-08 ENCOUNTER — OFFICE VISIT (OUTPATIENT)
Dept: PRIMARY CARE | Facility: CLINIC | Age: 53
End: 2023-11-08
Payer: COMMERCIAL

## 2023-11-08 VITALS
SYSTOLIC BLOOD PRESSURE: 104 MMHG | TEMPERATURE: 97.7 F | WEIGHT: 160.34 LBS | HEART RATE: 82 BPM | RESPIRATION RATE: 18 BRPM | BODY MASS INDEX: 30.3 KG/M2 | OXYGEN SATURATION: 97 % | DIASTOLIC BLOOD PRESSURE: 78 MMHG

## 2023-11-08 DIAGNOSIS — T80.90XA INJECTION SITE REACTION, INITIAL ENCOUNTER: Primary | ICD-10-CM

## 2023-11-08 PROCEDURE — 3008F BODY MASS INDEX DOCD: CPT | Performed by: NURSE PRACTITIONER

## 2023-11-08 PROCEDURE — 99214 OFFICE O/P EST MOD 30 MIN: CPT | Performed by: NURSE PRACTITIONER

## 2023-11-08 ASSESSMENT — PAIN SCALES - GENERAL: PAINLEVEL: 0-NO PAIN

## 2023-11-08 ASSESSMENT — ENCOUNTER SYMPTOMS: ROS SKIN COMMENTS: SEE HPI

## 2023-11-08 NOTE — PROGRESS NOTES
Subjective   Patient ID: Elena Michelle is a 53 y.o. female who presents for Immunization reaction (Reaction to the shingles vaccine from last Thursday. Warm to touch, sore and itchiness. Breaking out in hives on neck and face- burning and dry sensation. ).    HPI     Patient presents to clinic for evaluation of localized with action from shingles vaccine.  Patient receiving his shingles vaccine 6 days ago.  She complains of warmth and redness at the site as well as itchiness in the face.  She denies feeling short of breath or having difficulty swallowing or breathing.  She also states she does not have rash.        Patient is currently taking prednisone has history of Crohn's disease.      Review of Systems   Skin:         See HPI   All other systems reviewed and are negative.      Objective   /78 (BP Location: Right arm, Patient Position: Sitting, BP Cuff Size: Adult)   Pulse 82   Temp 36.5 °C (97.7 °F) (Temporal)   Resp 18   Wt 72.7 kg (160 lb 5.4 oz)   SpO2 97%   BMI 30.30 kg/m²     Physical Exam  Vitals reviewed.   Constitutional:       Appearance: Normal appearance.   HENT:      Mouth/Throat:      Mouth: Mucous membranes are moist.      Pharynx: Oropharynx is clear.      Comments: No uvular or tongue swelling noted.  Cardiovascular:      Rate and Rhythm: Normal rate and regular rhythm.   Pulmonary:      Effort: Pulmonary effort is normal.      Breath sounds: Normal breath sounds. No wheezing.   Musculoskeletal:         General: Normal range of motion.   Skin:     General: Skin is warm and dry.      Findings: No rash.      Comments: Left arm over the injection site with erythema to touch.  Neurovascular intact.   Neurological:      Mental Status: She is alert and oriented to person, place, and time.         Assessment/Plan   Problem List Items Addressed This Visit    None  Visit Diagnoses       Injection site reaction, initial encounter    -  Primary  Ice packs  Over-the-counter antihistamine as  needed  Over-the-counter hydrocortisone cream as needed.  Follow-up if no improvement.

## 2023-11-08 NOTE — PATIENT INSTRUCTIONS
You have a local reaction to from the shingles injection.  Use Ice packs 20 minutes on at least three times a day.  Claritin as needed.  Hydrocortisone cream to area.  Follow-up as needed.

## 2023-11-09 ENCOUNTER — TELEPHONE (OUTPATIENT)
Dept: GASTROENTEROLOGY | Facility: CLINIC | Age: 53
End: 2023-11-09
Payer: COMMERCIAL

## 2023-11-09 RX ORDER — DIPHENHYDRAMINE HCL 25 MG
25 CAPSULE ORAL ONCE
OUTPATIENT
Start: 2023-11-14

## 2023-11-14 ENCOUNTER — APPOINTMENT (OUTPATIENT)
Dept: INFUSION THERAPY | Facility: CLINIC | Age: 53
End: 2023-11-14
Payer: COMMERCIAL

## 2023-11-15 ENCOUNTER — HOSPITAL ENCOUNTER (OUTPATIENT)
Dept: GASTROENTEROLOGY | Facility: HOSPITAL | Age: 53
Discharge: HOME | End: 2023-11-15
Payer: COMMERCIAL

## 2023-11-15 DIAGNOSIS — K50.919 CROHN'S DISEASE WITH COMPLICATION, UNSPECIFIED GASTROINTESTINAL TRACT LOCATION (MULTI): ICD-10-CM

## 2023-11-15 DIAGNOSIS — K52.9 ILEITIS: ICD-10-CM

## 2023-11-15 PROCEDURE — 91110 GI TRC IMG INTRAL ESOPH-ILE: CPT

## 2023-11-15 PROCEDURE — 45385 COLONOSCOPY W/LESION REMOVAL: CPT | Performed by: STUDENT IN AN ORGANIZED HEALTH CARE EDUCATION/TRAINING PROGRAM

## 2023-11-15 PROCEDURE — 91110 GI TRC IMG INTRAL ESOPH-ILE: CPT | Performed by: STUDENT IN AN ORGANIZED HEALTH CARE EDUCATION/TRAINING PROGRAM

## 2023-11-15 PROCEDURE — 45380 COLONOSCOPY AND BIOPSY: CPT | Performed by: STUDENT IN AN ORGANIZED HEALTH CARE EDUCATION/TRAINING PROGRAM

## 2023-11-15 PROCEDURE — 2720000007 HC OR 272 NO HCPCS

## 2023-11-23 NOTE — ADDENDUM NOTE
Encounter addended by: Haresh Parnell MD on: 11/23/2023 2:36 PM   Actions taken: SmartForm saved, Clinical Note Signed, Charge Capture section accepted

## 2023-11-23 NOTE — ADDENDUM NOTE
Encounter addended by: Haresh Parnell MD on: 11/23/2023 2:39 PM   Actions taken: Clinical Note Signed

## 2023-11-23 NOTE — PROCEDURES
Capsule Endoscopy Small Intestine    Date/Time: 11/23/2023 2:32 PM    Performed by: Haresh Parnell MD  Authorized by: Haresh Parnell MD    Consent:     Consent obtained:  Written    Consent given by:  Patient    Risks discussed:  Bleeding, infection and pain  Universal protocol:     Procedure explained and questions answered to patient or proxy's satisfaction: yes      Relevant documents present and verified: yes      Test results available: yes      Imaging studies available: yes      Required blood products, implants, devices, and special equipment available: yes      Immediately prior to procedure, a time out was called: yes      Patient identity confirmed:  Verbally with patient  Indications:     Indications:  Abd pain, rule out Crohn's  Procedure specific details:      Capsule swallowed by mouth     *First gastric image: 0.0.44  First duodenal image: 0.05.18  First cecal image: 2.11.16    *Mild gastritis  Moderate duodenitis  Mild Patchy erythema throughout the small bowel    Fair prep in some areas of the small and large intestinal limiting views, lesions among others could have been missed   Signal interruption noted in some areas of the colon     *Plan:   Planning for treatment  Lactose free low fiber diet   Avoid NSAIDs and smoking  Follow in GI office next available      Post-procedure details:     Procedure completion:  Tolerated well, no immediate complications

## 2023-12-03 NOTE — ADDENDUM NOTE
Encounter addended by: Haresh Parnell MD on: 12/3/2023 11:50 AM   Actions taken: Clinical Note Signed

## 2023-12-05 ENCOUNTER — TELEPHONE (OUTPATIENT)
Dept: GASTROENTEROLOGY | Facility: CLINIC | Age: 53
End: 2023-12-05
Payer: COMMERCIAL

## 2023-12-20 ENCOUNTER — OFFICE VISIT (OUTPATIENT)
Dept: URGENT CARE | Facility: CLINIC | Age: 53
End: 2023-12-20
Payer: COMMERCIAL

## 2023-12-20 VITALS
HEART RATE: 63 BPM | TEMPERATURE: 97.5 F | SYSTOLIC BLOOD PRESSURE: 109 MMHG | RESPIRATION RATE: 18 BRPM | DIASTOLIC BLOOD PRESSURE: 75 MMHG

## 2023-12-20 DIAGNOSIS — J06.9 ACUTE URI: Primary | ICD-10-CM

## 2023-12-20 LAB — POC RAPID STREP: NEGATIVE

## 2023-12-20 PROCEDURE — 3008F BODY MASS INDEX DOCD: CPT | Performed by: PHYSICIAN ASSISTANT

## 2023-12-20 PROCEDURE — 99203 OFFICE O/P NEW LOW 30 MIN: CPT | Performed by: PHYSICIAN ASSISTANT

## 2023-12-20 PROCEDURE — 87880 STREP A ASSAY W/OPTIC: CPT | Performed by: PHYSICIAN ASSISTANT

## 2023-12-20 PROCEDURE — 87636 SARSCOV2 & INF A&B AMP PRB: CPT

## 2023-12-20 RX ORDER — BROMPHENIRAMINE MALEATE, PSEUDOEPHEDRINE HYDROCHLORIDE, AND DEXTROMETHORPHAN HYDROBROMIDE 2; 30; 10 MG/5ML; MG/5ML; MG/5ML
10 SYRUP ORAL 3 TIMES DAILY PRN
Qty: 118 ML | Refills: 0 | Status: SHIPPED | OUTPATIENT
Start: 2023-12-20 | End: 2023-12-25

## 2023-12-20 ASSESSMENT — ENCOUNTER SYMPTOMS
NEUROLOGICAL NEGATIVE: 1
NAUSEA: 0
SINUS PRESSURE: 0
DIARRHEA: 0
FATIGUE: 1
EYE DISCHARGE: 0
APPETITE CHANGE: 0
EYE REDNESS: 0
SORE THROAT: 1
FEVER: 0
SHORTNESS OF BREATH: 0
FLANK PAIN: 0
ABDOMINAL PAIN: 0
VOMITING: 0
PSYCHIATRIC NEGATIVE: 1
COLOR CHANGE: 0
BACK PAIN: 0
COUGH: 1
RHINORRHEA: 0
EYE PAIN: 0
HEMATOLOGIC/LYMPHATIC NEGATIVE: 1
ACTIVITY CHANGE: 0
WOUND: 0
ARTHRALGIAS: 0
ENDOCRINE NEGATIVE: 1
WHEEZING: 0
ALLERGIC/IMMUNOLOGIC NEGATIVE: 1
BLOOD IN STOOL: 0
DYSURIA: 0

## 2023-12-20 ASSESSMENT — PAIN SCALES - GENERAL: PAINLEVEL: 5

## 2023-12-20 NOTE — PATIENT INSTRUCTIONS
Assessment/Plan   Problem List Items Addressed This Visit       Acute URI - Primary    Relevant Medications    brompheniramine-pseudoeph-DM 2-30-10 mg/5 mL syrup    Other Relevant Orders    Group A Streptococcus, PCR    Sars-CoV-2 and Influenza A/B PCR   -Patient with symptoms and history consistent with a viral upper respiratory infection   Antibiotics are not necessary and do not help treat viral respiratory tract infections.   Common respiratory tract infections include the common cold. Typical symptoms include nasal congestion, a runny nose, scratchy throat, cough, and irritability.    EVIDENCE BASED MEDICINE FOR UPPER RESPIRATORY INFECTIONS:    The best evidence for the prevention of the common cold supports physical interventions (e.g., handwashing).    The best evidence for traditional treatments supports the use of acetaminophen and nonsteroidal anti-inflammatory drugs (for pain and fever) and possibly antihistamine-decongestant combinations.     The best evidence for nontraditional treatments of the common cold supports the use of honey at bedtime for cough in children over one year.

## 2023-12-20 NOTE — PROGRESS NOTES
Subjective   Patient ID: Elena Michelle is a 53 y.o. female who presents for sore throat since this morning.  Denies any recorded fevers or chills.  Pain radiating to her left ear.  Denies any chest pain or shortness of breath any nausea vomiting diarrhea or abdominal pain.      Past Medical History:   Diagnosis Date    Aspirin sensitivity     BMI 31.0-31.9,adult     Chronic diarrhea     Chronic ethmoidal sinusitis     Chronic maxillary sinusitis     Crohn's colitis (CMS/HCC)     Decreased sense of smell     Fatty liver     Ileitis     Memory loss     Nasal polyp     Pain in left foot 07/01/2021    Left foot pain    Pain in right foot 07/01/2021    Right foot pain    Plantar fasciitis, bilateral     Post-nasal drip     Rhinorrhea        Review of Systems   Constitutional:  Positive for fatigue. Negative for activity change, appetite change and fever.   HENT:  Positive for congestion, ear pain and sore throat. Negative for rhinorrhea and sinus pressure.    Eyes:  Negative for pain, discharge and redness.   Respiratory:  Positive for cough. Negative for shortness of breath and wheezing.    Cardiovascular:  Negative for chest pain and leg swelling.   Gastrointestinal:  Negative for abdominal pain, blood in stool, diarrhea, nausea and vomiting.   Endocrine: Negative.    Genitourinary:  Negative for dysuria and flank pain.   Musculoskeletal:  Negative for arthralgias, back pain and gait problem.   Skin:  Negative for color change, rash and wound.   Allergic/Immunologic: Negative.    Neurological: Negative.    Hematological: Negative.    Psychiatric/Behavioral: Negative.         Objective   /75   Pulse 63   Temp 36.4 °C (97.5 °F)   Resp 18   Physical Exam  Vitals reviewed.   Constitutional:       General: She is not in acute distress.     Appearance: Normal appearance. She is not toxic-appearing.   HENT:      Head: Normocephalic.      Right Ear: Tympanic membrane and ear canal normal. No tenderness. No mastoid  tenderness.      Left Ear: Tympanic membrane and ear canal normal. No tenderness. No mastoid tenderness.      Nose: Congestion and rhinorrhea present.      Mouth/Throat:      Mouth: Mucous membranes are moist.      Pharynx: Oropharynx is clear. Posterior oropharyngeal erythema present.   Eyes:      Conjunctiva/sclera: Conjunctivae normal.      Pupils: Pupils are equal, round, and reactive to light.   Cardiovascular:      Rate and Rhythm: Normal rate and regular rhythm.      Heart sounds: No murmur heard.  Pulmonary:      Effort: No respiratory distress.      Breath sounds: No stridor. No wheezing, rhonchi or rales.   Chest:      Chest wall: No tenderness.   Abdominal:      Tenderness: There is no abdominal tenderness. There is no guarding.   Musculoskeletal:         General: Normal range of motion.   Skin:     General: Skin is warm and dry.      Capillary Refill: Capillary refill takes less than 2 seconds.      Findings: No erythema.   Neurological:      General: No focal deficit present.      Mental Status: She is alert.         Assessment/Plan   Problem List Items Addressed This Visit       Acute URI - Primary    Relevant Medications    brompheniramine-pseudoeph-DM 2-30-10 mg/5 mL syrup    Other Relevant Orders    Sars-CoV-2 and Influenza A/B PCR    POCT rapid strep A manually resulted   -Patient with symptoms and history consistent with a viral upper respiratory infection   Antibiotics are not necessary and do not help treat viral respiratory tract infections.   Common respiratory tract infections include the common cold. Typical symptoms include nasal congestion, a runny nose, scratchy throat, cough, and irritability.    EVIDENCE BASED MEDICINE FOR UPPER RESPIRATORY INFECTIONS:    The best evidence for the prevention of the common cold supports physical interventions (e.g., handwashing).    The best evidence for traditional treatments supports the use of acetaminophen and nonsteroidal anti-inflammatory drugs  (for pain and fever) and possibly antihistamine-decongestant combinations.     The best evidence for nontraditional treatments of the common cold supports the use of honey at bedtime for cough in children over one year.

## 2023-12-21 LAB
FLUAV RNA RESP QL NAA+PROBE: NOT DETECTED
FLUBV RNA RESP QL NAA+PROBE: NOT DETECTED
SARS-COV-2 ORF1AB RESP QL NAA+PROBE: NOT DETECTED

## 2024-01-11 ENCOUNTER — OFFICE VISIT (OUTPATIENT)
Dept: PRIMARY CARE | Facility: CLINIC | Age: 54
End: 2024-01-11
Payer: COMMERCIAL

## 2024-01-11 VITALS
TEMPERATURE: 97.2 F | WEIGHT: 166.7 LBS | OXYGEN SATURATION: 97 % | BODY MASS INDEX: 31.5 KG/M2 | HEART RATE: 88 BPM | DIASTOLIC BLOOD PRESSURE: 80 MMHG | RESPIRATION RATE: 18 BRPM | SYSTOLIC BLOOD PRESSURE: 124 MMHG

## 2024-01-11 DIAGNOSIS — M79.89 BILATERAL HAND SWELLING: Primary | ICD-10-CM

## 2024-01-11 PROCEDURE — 99214 OFFICE O/P EST MOD 30 MIN: CPT | Performed by: NURSE PRACTITIONER

## 2024-01-11 PROCEDURE — 3008F BODY MASS INDEX DOCD: CPT | Performed by: NURSE PRACTITIONER

## 2024-01-11 RX ORDER — METHYLPREDNISOLONE 4 MG/1
TABLET ORAL
Qty: 21 TABLET | Refills: 0 | Status: SHIPPED | OUTPATIENT
Start: 2024-01-11 | End: 2024-01-18

## 2024-01-11 ASSESSMENT — PAIN SCALES - GENERAL: PAINLEVEL: 5

## 2024-01-11 NOTE — PROGRESS NOTES
Subjective   Patient ID: Elena Michelle is a 53 y.o. female who presents for Hand swelling (Painful. Swell up in the AM. Hard to open things and grab things. Reports that it has been bothersome for over a year and is persisting/getting worse. Numbness/tingling feeling. ).    HPI     Patient presents to clinic for evaluation of bilateral hand pain for 1-1/2 years and worsening.  Patient states she was seen by orthopedics carpal tunnel was ruled out and she was given a steroid injection.  She states pain resolved x 1 day after the injection.  Patient denies trauma or injury to her hands.  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  Patient without complaints of joint pain or swelling in any other joints.      Patient has a history of Crohn's disease we discussed possible relationship of joint inflammation and Crohn's disease.  Patient is being followed by gastroenterology managing her Crohn's disease.     Review of Systems   Musculoskeletal:         See HPI   All other systems reviewed and are negative.      Objective   /80 (BP Location: Right arm, Patient Position: Sitting, BP Cuff Size: Adult)   Pulse 88   Temp 36.2 °C (97.2 °F) (Temporal)   Resp 18   Wt 75.6 kg (166 lb 11.2 oz)   SpO2 97%   BMI 31.50 kg/m²     Physical Exam  Vitals reviewed.   Constitutional:       Appearance: Normal appearance. She is not ill-appearing or toxic-appearing.   Cardiovascular:      Rate and Rhythm: Normal rate and regular rhythm.   Pulmonary:      Effort: Pulmonary effort is normal.      Breath sounds: Normal breath sounds.   Musculoskeletal:         General: Normal range of motion.      Comments: Palmar surface of bilateral hands with mild edema mild erythema and warmth to touch.  Neurovascular intact able to open and close hands without difficulty.   Skin:     General: Skin is warm and dry.   Neurological:      Mental Status:  She is alert and oriented to person, place, and time.         Assessment/Plan   Problem List Items Addressed This Visit    None  Visit Diagnoses       Bilateral hand swelling    -  Primary    Relevant Medications    methylPREDNISolone (Medrol Dospak) 4 mg tablets    Other Relevant Orders    Referral to Rheumatology

## 2024-01-11 NOTE — PATIENT INSTRUCTIONS
Start Steroids as prescribed.    Referral submitted to Rheumatology.  Hand Exercises daily.  Follow-up with GI.

## 2024-01-31 ENCOUNTER — OFFICE VISIT (OUTPATIENT)
Dept: RHEUMATOLOGY | Facility: CLINIC | Age: 54
End: 2024-01-31
Payer: COMMERCIAL

## 2024-01-31 ENCOUNTER — LAB (OUTPATIENT)
Dept: LAB | Facility: LAB | Age: 54
End: 2024-01-31
Payer: COMMERCIAL

## 2024-01-31 VITALS
BODY MASS INDEX: 31.15 KG/M2 | SYSTOLIC BLOOD PRESSURE: 95 MMHG | HEIGHT: 61 IN | HEART RATE: 76 BPM | DIASTOLIC BLOOD PRESSURE: 68 MMHG | WEIGHT: 165 LBS

## 2024-01-31 DIAGNOSIS — R20.2 PARESTHESIA OF BOTH HANDS: Primary | ICD-10-CM

## 2024-01-31 DIAGNOSIS — M19.049 HAND ARTHROPATHY: ICD-10-CM

## 2024-01-31 LAB
CCP IGG SERPL-ACNC: <1 U/ML
CRP SERPL-MCNC: 0.61 MG/DL
ERYTHROCYTE [SEDIMENTATION RATE] IN BLOOD BY WESTERGREN METHOD: 30 MM/H (ref 0–30)
RHEUMATOID FACT SER NEPH-ACNC: <10 IU/ML (ref 0–15)
URATE SERPL-MCNC: 4.5 MG/DL (ref 2.3–6.7)

## 2024-01-31 PROCEDURE — 84550 ASSAY OF BLOOD/URIC ACID: CPT

## 2024-01-31 PROCEDURE — 99204 OFFICE O/P NEW MOD 45 MIN: CPT | Performed by: INTERNAL MEDICINE

## 2024-01-31 PROCEDURE — 86431 RHEUMATOID FACTOR QUANT: CPT

## 2024-01-31 PROCEDURE — 86200 CCP ANTIBODY: CPT

## 2024-01-31 PROCEDURE — 36415 COLL VENOUS BLD VENIPUNCTURE: CPT

## 2024-01-31 PROCEDURE — 85652 RBC SED RATE AUTOMATED: CPT

## 2024-01-31 PROCEDURE — 3008F BODY MASS INDEX DOCD: CPT | Performed by: INTERNAL MEDICINE

## 2024-01-31 PROCEDURE — 86140 C-REACTIVE PROTEIN: CPT

## 2024-01-31 NOTE — PROGRESS NOTES
Initial Rheumatology Patient Visit    Chief Complaint:  Elena Michelle is a 53 y.o. female presenting today for New Patient Visit (BILATERAL HAND PAIN).    History of Presenting Problem:   52 y/o female with Hx of Crohn's disease present with pain and stiffness in her bilateral wrist area x 2 years. She report numbness and tingling in her fingertip except her thumbs. She works as a a  and reports she is constantly dropping things from her hands .She did see orthopedic and had an EMG which was negative. She report she had steroid injection and it did not help. She report she try to wear a hand brace and it did not help.  Her PCP  did give her a Medrol Dosepak which did help with the pain about 80% .  She denies any other joint complaints  She is allergic to ibuprofen and naproxen          Problem List:   Patient Active Problem List   Diagnosis    Abdominal cramping    Chronic diarrhea    Chronic ethmoidal sinusitis    Chronic maxillary sinusitis    Contusion of knee, right    Cough in adult    Crohn disease (CMS/HCC)    Decreased sense of smell    Facial pressure    Fatty liver    Ileitis    Memory loss    Nasal congestion    Nasal polyp    Needs smoking cessation education    Otitis media, recurrent    Plantar fasciitis, bilateral    Post-nasal drip    Rhinorrhea    Right knee pain    Sore throat    Swelling of index finger    Viral syndrome    Acute URI       Past Medical History:   Past Medical History:   Diagnosis Date    Aspirin sensitivity     BMI 31.0-31.9,adult     Chronic diarrhea     Chronic ethmoidal sinusitis     Chronic maxillary sinusitis     Crohn's colitis (CMS/HCC)     Decreased sense of smell     Fatty liver     Ileitis     Memory loss     Nasal polyp     Pain in left foot 07/01/2021    Left foot pain    Pain in right foot 07/01/2021    Right foot pain    Plantar fasciitis, bilateral     Post-nasal drip     Rhinorrhea        Surgical History:   Past Surgical History:   Procedure  "Laterality Date     SECTION, CLASSIC      CHOLECYSTECTOMY      COLONOSCOPY      (Fiberoptic)    HYSTERECTOMY      NOSE SURGERY      Rhinologic surgery    TONSILLECTOMY          Allergies:   Allergies   Allergen Reactions    Hydrocodone-Acetaminophen Unknown    Ibuprofen Unknown    Naproxen Sodium Unknown    Nsaids (Non-Steroidal Anti-Inflammatory Drug) Unknown       Medications:   Current Outpatient Medications:     acetaminophen (Tylenol) 500 mg tablet, Take 1 tablet (500 mg) by mouth every 8 hours if needed for pain., Disp: , Rfl:     budesonide EC (Entocort EC) 3 mg 24 hr capsule, Take 3 capsules (9 mg) by mouth once daily in the morning. (Patient not taking: Reported on 2024), Disp: 90 capsule, Rfl: 11    nicotine 21-14-7 mg/24 hr patch, TD daily, sequential, Place 1 each on the skin once every 24 hours. (Patient not taking: Reported on 2024), Disp: 30 each, Rfl: 1    Review of Systems:   ROS: Denies significant    Morning stiffness, Denies dry eyes and mouth, Hx of chronic sinusitis, Denies oral, nasal or genital ulcers, Denies sun sensitivity, Denies Raynaud's phenomenon. Denies Uveitis or recent vision changes but report occasional blurry vision . Denies new rashes or Hx of Psoriasis, Denies alopecia,   Denies Chest pain/SOB, cough, Hx of asthma, Denies Fever/chills/sweats, Denies Fatigue, weight loss  Denies abdominal pain, New onset Dyspepsia, dysphasia, nausea/vomiting/diarrhea, dark/tarry stools, blood in stools or urine. Denies Chronic back pain.   Denies Hx of blood clot . Denies Hx of easy bruising or bleeding. Denies Headaches, numbness and tingling, weakness.  All other systems have been reviewed and are negative for complaint.   Denies family Hx of Gout or RA.  Objective   Physical Examination:    Visit Vitals  BP 95/68   Pulse 76   Ht 1.549 m (5' 1\")   Wt 74.8 kg (165 lb)   BMI 31.18 kg/m²   OB Status Hysterectomy   Smoking Status Every Day   BSA 1.79 m²        Gen: NAD, A&O x " 3  HEENT: clear sclera and conjunctiva,     Musculoskeletal:   Neck; WNL, full ROM  Shoulder: WNL, full ROM  Elbow:WNL, full ROM, no effusion noted  Wrist and fingers;no active synovitis noted, Full ROM in the Wrist , Good fist and   Knees:  No effusions or crepitation, full ROM.  Hips; WNL, full ROM, Negative Earnest test  Ankle, Feet; WNL, full ROM    Skin: No rashes or lesions seen, no nail changes  Neuro: A&O x3, Normal Gait    Procedures :None    Orders:  Orders Placed This Encounter   Procedures    Citrulline Antibody, IgG    Rheumatoid Factor    Uric Acid    C-Reactive Protein    Sedimentation Rate        Provider Impression:   Assessment/Plan   Encounter Diagnoses   Name Primary?    Hand arthropathy     Paresthesia of both hands Yes      52 y/o female with Hx of Crohn's disease present with pain and stiffness in her bilateral wrist area x 2 years. She report numbness and tingling in her fingertip except her thumbs.  On exam patient did not  Inflammatory findings positive for Tinel's test. . Rule out inflammatory arthritis.  - Given patient has failed steroid injection and her symptoms appear to be neuropathic recommend follow-up with neurology for neuropathy workup even though EMG is negative.  Consider gabapentin  or duloxetine trial  -Obtain additional serologies  -Discussed with patient to consider treatment for her Crohn's in case this may have some improvement in her musculoskeletal symptoms  -Follow-up based on results, patient consider second evaluation by hand surgery given positive clinical signs despite EMG

## 2024-02-01 ENCOUNTER — OFFICE VISIT (OUTPATIENT)
Dept: ORTHOPEDIC SURGERY | Facility: CLINIC | Age: 54
End: 2024-02-01
Payer: COMMERCIAL

## 2024-02-01 DIAGNOSIS — M18.9 OSTEOARTHRITIS OF CARPOMETACARPAL (CMC) JOINT OF THUMB, UNSPECIFIED LATERALITY, UNSPECIFIED OSTEOARTHRITIS TYPE: Primary | ICD-10-CM

## 2024-02-01 PROCEDURE — 3008F BODY MASS INDEX DOCD: CPT | Performed by: ORTHOPAEDIC SURGERY

## 2024-02-01 PROCEDURE — 99213 OFFICE O/P EST LOW 20 MIN: CPT | Performed by: ORTHOPAEDIC SURGERY

## 2024-02-05 ENCOUNTER — HOSPITAL ENCOUNTER (OUTPATIENT)
Dept: RADIOLOGY | Facility: CLINIC | Age: 54
Discharge: HOME | End: 2024-02-05
Payer: COMMERCIAL

## 2024-02-05 ENCOUNTER — OFFICE VISIT (OUTPATIENT)
Dept: ORTHOPEDIC SURGERY | Facility: CLINIC | Age: 54
End: 2024-02-05
Payer: COMMERCIAL

## 2024-02-05 DIAGNOSIS — G56.03 BILATERAL CARPAL TUNNEL SYNDROME: ICD-10-CM

## 2024-02-05 DIAGNOSIS — M79.644 BILATERAL THUMB PAIN: Primary | ICD-10-CM

## 2024-02-05 DIAGNOSIS — M79.645 BILATERAL THUMB PAIN: ICD-10-CM

## 2024-02-05 DIAGNOSIS — M79.645 BILATERAL THUMB PAIN: Primary | ICD-10-CM

## 2024-02-05 DIAGNOSIS — M79.644 BILATERAL THUMB PAIN: ICD-10-CM

## 2024-02-05 PROCEDURE — 20526 THER INJECTION CARP TUNNEL: CPT | Performed by: ORTHOPAEDIC SURGERY

## 2024-02-05 PROCEDURE — 20600 DRAIN/INJ JOINT/BURSA W/O US: CPT | Performed by: ORTHOPAEDIC SURGERY

## 2024-02-05 PROCEDURE — 73140 X-RAY EXAM OF FINGER(S): CPT | Mod: RT

## 2024-02-05 PROCEDURE — 2500000004 HC RX 250 GENERAL PHARMACY W/ HCPCS (ALT 636 FOR OP/ED): Performed by: ORTHOPAEDIC SURGERY

## 2024-02-05 PROCEDURE — 73140 X-RAY EXAM OF FINGER(S): CPT | Mod: LT

## 2024-02-05 PROCEDURE — 2500000005 HC RX 250 GENERAL PHARMACY W/O HCPCS: Performed by: ORTHOPAEDIC SURGERY

## 2024-02-05 PROCEDURE — 3008F BODY MASS INDEX DOCD: CPT | Performed by: ORTHOPAEDIC SURGERY

## 2024-02-05 PROCEDURE — 73140 X-RAY EXAM OF FINGER(S): CPT | Mod: RIGHT SIDE | Performed by: RADIOLOGY

## 2024-02-05 PROCEDURE — 99214 OFFICE O/P EST MOD 30 MIN: CPT | Performed by: ORTHOPAEDIC SURGERY

## 2024-02-05 RX ORDER — LIDOCAINE HYDROCHLORIDE 10 MG/ML
1 INJECTION INFILTRATION; PERINEURAL
Status: COMPLETED | OUTPATIENT
Start: 2024-02-05 | End: 2024-02-05

## 2024-02-05 RX ADMIN — LIDOCAINE HYDROCHLORIDE 1 ML: 10 INJECTION, SOLUTION INFILTRATION; PERINEURAL at 14:30

## 2024-02-05 RX ADMIN — TRIAMCINOLONE ACETONIDE 10 MG: 10 INJECTION, SUSPENSION INTRA-ARTICULAR; INTRALESIONAL at 14:30

## 2024-02-05 NOTE — PROGRESS NOTES
Hand / UE Inj/Asp: bilateral carpal tunnel for carpal tunnel syndrome on 2/5/2024 2:30 PM  Indications: diagnostic and therapeutic  Details: 25 G needle, volar approach  Medications (Right): 10 mg triamcinolone acetonide 10 mg/mL; 1 mL lidocaine 10 mg/mL (1 %)  Medications (Left): 10 mg triamcinolone acetonide 10 mg/mL; 1 mL lidocaine 10 mg/mL (1 %)  Outcome: tolerated well, no immediate complications    Bilateral Carpal Tunnel Injection: It was explained to the patient that the risks of a steroid injection include but are not limited to infection, local skin irritation, skin atrophy, calcification, continued pain and discomfort, elevated blood sugar, burning, failure to relieve pain, and possible late infection. The patient verbalized good insight and verbalized consent for the injection. It was further explained that the post-injection discomfort can be alleviated with additional medications, ice, elevation, and rest over the first 24 hours, and that these modalities are recommended.  After informed consent was provided, patient identification was confirmed, and allergies were verified, the patient was appropriately positioned. The site was marked and time-out performed.    Using aseptic technique, a solution containing 1 mL of 10 mg of Kenalog and 1 mL of 1% lidocaine without epinephrine was injected into the patient's right carpal tunnel. A 25-gauge needle was inserted just ulnar to the anatomic course of the palmaris longus tendon at the level of the distal wrist flexion crease. It was slowly advanced deep to the distal antebrachial fascia. The solution was then injected slowly, taking care to ensure that the patient experienced no paresthesias during the injection of the solution. After injection of the solution, the patient was asked to perform active flexion and extension of the digits and wrist to help disperse the solution. A band-aid was then placed at the injection site. The patient tolerated this right  carpal tunnel injection well.      Using aseptic technique, a solution containing 1 mL of 10 mg of Kenalog and 1 mL of 1% lidocaine without epinephrine was injected into the patient's left carpal tunnel. A 25-gauge needle was inserted just ulnar to the anatomic course of the palmaris longus tendon at the level of the distal wrist flexion crease. It was slowly advanced deep to the distal antebrachial fascia. The solution was then injected slowly, taking care to ensure that the patient experienced no paresthesias during the injection of the solution. After injection of the solution, the patient was asked to perform active flexion and extension of the digits and wrist to help disperse the solution. A band-aid was then placed at the injection site. The patient tolerated this left carpal tunnel injection well.    Procedure, treatment alternatives, risks and benefits explained, specific risks discussed. Consent was given by the patient. Immediately prior to procedure a time out was called to verify the correct patient, procedure, equipment, support staff and site/side marked as required. Patient was prepped and draped in the usual sterile fashion.       Hand / UE Inj/Asp: bilateral thumb CMC for osteoarthritis on 2/5/2024 2:30 PM  Indications: pain  Details: 25 G needle, dorsal approach  Medications (Right): 10 mg triamcinolone acetonide 10 mg/mL; 1 mL lidocaine 10 mg/mL (1 %)  Medications (Left): 1 mL lidocaine 10 mg/mL (1 %); 10 mg triamcinolone acetonide 10 mg/mL  Outcome: tolerated well, no immediate complications    Bilateral Thumb Carpometacarpal Joint Injection: It was explained to the patient that the risks of a steroid injection include but are not limited to infection, local skin irritation, skin atrophy, calcification, continued pain and discomfort, elevated blood sugar, burning, failure to relieve pain, and possible late infection. The patient verbalized good insight and verbalized consent for the injection.  It was further explained that the postinjection discomfort can be alleviated with additional medications, ice, elevation, and rest over the first 24 hours, and that these modalities are recommended.    Using aseptic technique, a solution containing 0.5 cc of 5 mg of Kenalog and 0.5 mL of 1% lidocaine without epinephrine was injected intraarticularly to the right thumb carpometacarpal joint. Digital palpation was used to localize the CMC articulation about the dorsal radial aspect of the joint. Gentle traction was then imparted to the thumb distally and a 25-gauge needle was advanced through the skin, subcutaneous tissue and capsule. The injection was then administered and the patient tolerated the injection well. A band-aid was then placed. It should be noted that ethyl chloride spray was used to make the injection delivery more comfortable for the patient.    Using aseptic technique, a solution containing 0.5 cc of 5 mg of Kenalog and 0.5 mL of 1% lidocaine without epinephrine was injected intraarticularly to the left thumb carpometacarpal joint. Digital palpation was used to localize the CMC articulation about the dorsal radial aspect of the joint. Gentle traction was then imparted to the thumb distally and a 25-gauge needle was advanced through the skin, subcutaneous tissue and capsule. The injection was then administered and the patient tolerated the injection well. A band-aid was then placed. It should be noted that ethyl chloride spray was used to make the injection delivery more comfortable for the patient.    Procedure, treatment alternatives, risks and benefits explained, specific risks discussed. Consent was given by the patient. Immediately prior to procedure a time out was called to verify the correct patient, procedure, equipment, support staff and site/side marked as required. Patient was prepped and draped in the usual sterile fashion.

## 2024-02-05 NOTE — PROGRESS NOTES
History present illness: Patient presents today for evaluation of symptoms compatible with bilateral carpal tunnel syndrome and bilateral thumb CMC arthritis.  EMG from April 2023 came back as a normal study.  Patient is ambidextrous.  The patient has pronounced pain and dysfunction on a daily basis.  She describes numbness and tingling to median nerve distribution to the bilateral hands.      Past medical history: The patient's past medical history, family history, social history, and review of systems were documented on the patient medical intake.  The updated data was reviewed in the electronic medical record.  History is negative except otherwise stated in history of present illness.        Physical examination:  General: Alert and oriented to person, place, and time.  No acute distress and breathing comfortably: Pleasant and cooperative with examination.  HEENT: Head is normocephalic and atraumatic.  Neck: Supple, no visible swelling.  Cardiovascular: No palpable tachycardia  Lungs: No audible wheezing or labored breathing  Abdomen: Nondistended.  Extremities: Evaluation of the bilateral upper extremities finds the patient had palpable radial artery at the wrists with brisk capillary refill to all digits.  Patient has intact sensation to axillary radial median and ulnar nerves.  There are no open wounds.  There are no signs of infection.  There is no evidence of lymphedema or lymphatic streaking.  The patient has supple compartments to bilateral arm forearm and hand.  Positive Tinel's over course of median nerve to bilateral wrist.  Focal tenderness to bilateral thumb CMC articulation.      Radiology:      Assessment: Bilateral thumb CMC arthritis.  Bilateral carpal tunnel syndrome.      Plan: Treatment options were discussed.  We talked about operative and nonoperative strategies.  Patient elects for steroid injection to bilateral carpal tunnel and to bilateral thumb CMC articulation into follow-up with me in  the office in 4 weeks.  No x-rays necessary upon return.        Procedure:

## 2024-02-23 ENCOUNTER — OFFICE VISIT (OUTPATIENT)
Dept: GASTROENTEROLOGY | Facility: CLINIC | Age: 54
End: 2024-02-23
Payer: COMMERCIAL

## 2024-02-23 VITALS
WEIGHT: 170 LBS | DIASTOLIC BLOOD PRESSURE: 80 MMHG | BODY MASS INDEX: 32.1 KG/M2 | HEIGHT: 61 IN | HEART RATE: 85 BPM | SYSTOLIC BLOOD PRESSURE: 124 MMHG

## 2024-02-23 DIAGNOSIS — K50.919 CROHN'S DISEASE WITH COMPLICATION, UNSPECIFIED GASTROINTESTINAL TRACT LOCATION (MULTI): Primary | ICD-10-CM

## 2024-02-23 DIAGNOSIS — K76.0 FATTY LIVER: ICD-10-CM

## 2024-02-23 DIAGNOSIS — F17.210 NICOTINE DEPENDENCE, CIGARETTES, UNCOMPLICATED: ICD-10-CM

## 2024-02-23 PROCEDURE — 3008F BODY MASS INDEX DOCD: CPT | Performed by: STUDENT IN AN ORGANIZED HEALTH CARE EDUCATION/TRAINING PROGRAM

## 2024-02-23 PROCEDURE — 99406 BEHAV CHNG SMOKING 3-10 MIN: CPT | Performed by: STUDENT IN AN ORGANIZED HEALTH CARE EDUCATION/TRAINING PROGRAM

## 2024-02-23 PROCEDURE — 99215 OFFICE O/P EST HI 40 MIN: CPT | Performed by: STUDENT IN AN ORGANIZED HEALTH CARE EDUCATION/TRAINING PROGRAM

## 2024-02-23 NOTE — PATIENT INSTRUCTIONS
1-we will contact your rheumatologist regarding the choice of treatment for Crohn's disease, we need to repeat the endoscopy and colonoscopy before we start treatment to check your baseline inflammation, please follow a lactose-free low fiber diet, avoid NSAIDs and smoking, please stay updated with vaccinations with your primary doctor, please follow with GYN for regular Pap smears, we need a yearly follow-up with dermatology

## 2024-02-23 NOTE — PROGRESS NOTES
10/15/21  54yo  lady with history of cholecystectomy, hysterectomy presenting with chronic history of lower abdominal pain, crampy, positive at night, radiating to her back, not relieved by passing gas or bowel movements, associated with nausea and soft stools up to 20 times per day with mucus, started 10 years ago, occurring once every 2 to 3 years and lasting for few weeks a few months. The patient had a CAT scan of the abdomen in July 2020 showing wall thickening of the terminal ileum, however she mentioned that she was never started on treatment or received an accurate diagnosis. She denies any fever chills vomiting dysphagia odynophagia heartburn melena hematochezia hematemesis.     2/11/2022  Patient will need for follow-up, feeling significantly better after starting budesonide taper, denies any episodes of abdominal pain or diarrhea     5/20/22  patient s here for follow up, symptoms recurred 5 d ago, having lower abd pain stabbing, significant watery diarrhea, nausea and vomiting, tolerating liquids, but vomiting solids     10/2023  Had 2 flare ups in 2022 as per her, Patient was recently admitted to [facility] on 9/2023 with severe abdominal pain and watery diarrhea up to 15 or 20 times a day, watery, stool tests unremarkable, CAT scan 9/2023 showing ileal and pancolonic thickening, got partially better on budesonide 9 mg daily and antibiotics    2/23/2024  Patient seen for follow-up, feeling well, having 2 bowel movements per day, normal, intermittently having 4 bowel movements depending on her diet, not at night, denies abdominal pain, patient did not start on Stelara because she was concerned about side effects     EGD 7/2020 at [facility] gastritis, bx wnl in stomach and duodenum  EGD 10/2023 small HH, mild esophagitis gastritis duodenitis   VCE 11-29-21 showing ileal erosions see official report  VCE 11/2023 patchy small bowel erythema   Colonoscopy 7/2020 at [facility] normal TI, bx wnl in TI and random  colon   Colonoscopy 11/2023 TI erosions , H, ac ta, bx unremarkable   Family history reviewed, not pertinent to chief complaint  Bowel movements as above  Denies NSAIDs, alcohol marijuana drug use, stopped smoking           The note was created using voice recognition transcription software. Despite proofreading, unintentional typographical errors may be present. Please contact the GI office with any questions or concerns.      Current Medications: reviewed     A 10 point review of system is negative except for what is mentioned in the HPI     Follow up with GI was advised         Vital Signs: Reviewed     Physical Exam:  General: no apparent distress, pleasant and cooperative  Skin:  Warm and dry, no jaundice  HEENT: No scleral icterus, no conjunctival pallor, normocephalic, atraumatic, mucous membranes moist  Neck:  atraumatic, trachea midline, no JVD  Chest:  decreased air entry to auscultation bilaterally. No wheezes, rales, or rhonchi  CV:  Regular rate and rhythm.  Positive S1/S2  Abdomen: no distension, +BS, soft, non-tender to palpation, no rebound tenderness, no guarding, no rigidity, no discernible ascites   Extremities: no lower extremity edema, Chronic pigmentary changes, no cyanosis  Neurological:  A&Ox3 , no asterixis  Psychiatric: cooperative      Investigations:  Labs, radiological imaging and cardiac work up were reviewed     1-hepatitis C antibody negative in 10/2023     2-BMI 32, lifestyle modifications advised     3- HCM, colonoscopy as above, will repeat according to findings below      4-Chronic intermittent abdominal pain and soft stools described as above suspicious for  Crohn's, initially planned for Stelara in 2022 however patient did not start her Stelara because she was concerned about side effects, risk and benefits explained, risks of not undergoing treatment for Crohn's explained including risks of developing lesions or cancer among others that might lead to multiple complications and  death, patient understands and verbalized her understanding, started budesonide after egd-colono on 5-16-24 5/20/22 abd pain and significant diarrhea recurred,  9/2023 admitted to [facility] with severe abdominal pain and diarrhea, CAT scan 9/2023 showing ileal and pancolonic thickening, egd and colonoscoopy showing non specific findings as above, but was already on budesonide, will require capsule endoscopy for further characterization of small bowel disease, partially better on budesonide, will plan for Stelara  10/2023 received budesonide course  2/26/24 spoke to rheumatology Dr Romero: Suspect some of her symptoms are related to neuropathy however if this is a possibility that her untreated Crohn's may be causing some inflammation within her joints considering a biologic that may include joint coverage would be helpful.      -CAT scan of the abdomen in July 2020 showing wall thickening of the terminal ileum see official report, records of EGD and colonoscopy as above, smoking cessation advised,   10/28/21 discussed with radiology Dr Gastelum, CT from 6/2020 highly suspicious for Crohn's, recent CTE 10/20/21 with resolved findings   11-29-21 VCE showing ileal erosions see official report  12/2/21 discussed with patient, after discussing with radiology and reviewing vce results, findings suggest ileal Crohn's disease, started budesonide with current significant improvement and resolution of symptoms      -7/2024 TPMT normal, negative TB, negative hepatitis B, prior EBV infection  CRP 0.61 in 1/2024, Calprotectin 8 in 10/2024  CDAI 140 asymp 2/2024        Avoid NSAIDs and smoking, lactose-free low fiber diet, follow crp, stool calprotectin, Dexa scan, will need to stay up to date with vaccinations, yearly dermatology exam, annual Pap smear, follow-up markers (CRP, calprotectin) q3 months after starting treatment, will reassess after 6 months after treatment by scope     4- fatty liver, healthy lifestyle  advised, fib 4 score F0 F1 in 2/2024    5-smoking cessation advised, 4 minutes, refused counselor referral

## 2024-02-26 RX ORDER — SODIUM, POTASSIUM,MAG SULFATES 17.5-3.13G
1 SOLUTION, RECONSTITUTED, ORAL ORAL 2 TIMES DAILY
Qty: 354 ML | Refills: 0 | Status: ON HOLD | OUTPATIENT
Start: 2024-02-26 | End: 2024-03-27 | Stop reason: ALTCHOICE

## 2024-02-26 RX ORDER — ONDANSETRON HYDROCHLORIDE 2 MG/ML
4 INJECTION, SOLUTION INTRAVENOUS ONCE AS NEEDED
Status: CANCELLED | OUTPATIENT
Start: 2024-02-26

## 2024-02-26 RX ORDER — SODIUM CHLORIDE, SODIUM LACTATE, POTASSIUM CHLORIDE, CALCIUM CHLORIDE 600; 310; 30; 20 MG/100ML; MG/100ML; MG/100ML; MG/100ML
20 INJECTION, SOLUTION INTRAVENOUS CONTINUOUS
Status: CANCELLED | OUTPATIENT
Start: 2024-02-26

## 2024-02-27 ENCOUNTER — OFFICE VISIT (OUTPATIENT)
Dept: URGENT CARE | Facility: CLINIC | Age: 54
End: 2024-02-27
Payer: COMMERCIAL

## 2024-02-27 ENCOUNTER — APPOINTMENT (OUTPATIENT)
Dept: URGENT CARE | Facility: CLINIC | Age: 54
End: 2024-02-27
Payer: COMMERCIAL

## 2024-02-27 VITALS
OXYGEN SATURATION: 94 % | DIASTOLIC BLOOD PRESSURE: 77 MMHG | BODY MASS INDEX: 31.18 KG/M2 | TEMPERATURE: 99 F | HEART RATE: 117 BPM | WEIGHT: 165 LBS | RESPIRATION RATE: 12 BRPM | SYSTOLIC BLOOD PRESSURE: 118 MMHG

## 2024-02-27 DIAGNOSIS — J02.9 SORE THROAT: Primary | ICD-10-CM

## 2024-02-27 DIAGNOSIS — U07.1 COVID-19: ICD-10-CM

## 2024-02-27 DIAGNOSIS — B34.9 VIRAL SYNDROME: ICD-10-CM

## 2024-02-27 LAB
POC RAPID STREP: NEGATIVE
POC TRIPLEX FLU A-AG: ABNORMAL
POC TRIPLEX FLU B-AG: ABNORMAL
POC TRIPLEX SARSCOV-2 AG: ABNORMAL

## 2024-02-27 PROCEDURE — 3008F BODY MASS INDEX DOCD: CPT | Performed by: PHYSICIAN ASSISTANT

## 2024-02-27 PROCEDURE — 87428 SARSCOV & INF VIR A&B AG IA: CPT | Performed by: PHYSICIAN ASSISTANT

## 2024-02-27 PROCEDURE — 99214 OFFICE O/P EST MOD 30 MIN: CPT | Performed by: PHYSICIAN ASSISTANT

## 2024-02-27 PROCEDURE — 87880 STREP A ASSAY W/OPTIC: CPT | Performed by: PHYSICIAN ASSISTANT

## 2024-02-27 ASSESSMENT — ENCOUNTER SYMPTOMS
ALLERGIC/IMMUNOLOGIC NEGATIVE: 1
GASTROINTESTINAL NEGATIVE: 1
COUGH: 0
ENDOCRINE NEGATIVE: 1
HEMATOLOGIC/LYMPHATIC NEGATIVE: 1
SORE THROAT: 1
PSYCHIATRIC NEGATIVE: 1
FEVER: 1
NEUROLOGICAL NEGATIVE: 1
CARDIOVASCULAR NEGATIVE: 1
MYALGIAS: 1
EYES NEGATIVE: 1

## 2024-02-27 ASSESSMENT — PAIN SCALES - GENERAL: PAINLEVEL: 10-WORST PAIN EVER

## 2024-02-27 NOTE — PROGRESS NOTES
Subjective   Patient ID: Elena Michelle is a 53 y.o. female.      History provided by:  Patient   used: No    Fever   Associated symptoms include a sore throat. Pertinent negatives include no congestion or coughing.   Sore Throat   Pertinent negatives include no congestion or coughing.   This is a 53 yr old female here for sore throat, fever up to 103 and myalgias x 1 day. No cough or URI sxs.     Review of Systems   Constitutional:  Positive for fever.   HENT:  Positive for sore throat. Negative for congestion.    Eyes: Negative.    Respiratory:  Negative for cough.    Cardiovascular: Negative.    Gastrointestinal: Negative.    Endocrine: Negative.    Genitourinary: Negative.    Musculoskeletal:  Positive for myalgias.   Skin: Negative.    Allergic/Immunologic: Negative.    Neurological: Negative.    Hematological: Negative.    Psychiatric/Behavioral: Negative.     All other systems reviewed and are negative.  /77   Pulse (!) 117   Temp 37.2 °C (99 °F)   Resp 12   Wt 74.8 kg (165 lb)   SpO2 94%   BMI 31.18 kg/m²     Objective   Physical Exam  Vitals and nursing note reviewed.   Constitutional:       Appearance: Normal appearance.   HENT:      Head: Normocephalic and atraumatic.      Right Ear: Tympanic membrane and ear canal normal.      Left Ear: Tympanic membrane and ear canal normal.      Mouth/Throat:      Mouth: Mucous membranes are moist.      Pharynx: Oropharynx is clear.   Cardiovascular:      Rate and Rhythm: Normal rate and regular rhythm.   Pulmonary:      Effort: Pulmonary effort is normal.      Breath sounds: Normal breath sounds.   Musculoskeletal:      Cervical back: Neck supple.   Lymphadenopathy:      Cervical: No cervical adenopathy.   Skin:     General: Skin is warm and dry.   Neurological:      General: No focal deficit present.      Mental Status: She is alert and oriented to person, place, and time.   Psychiatric:         Mood and Affect: Mood normal.          Behavior: Behavior normal.     Rapid strep-negative,. Rapid COVID/flu-positive for COVID    Assessment:  COVID 19  Viral syndrome    Plan;  Sx tx  Home quarantine for 5 days from start of sxs  Wear mask, 6 feet rule, and wash hands and surface  Pcp phone call if want COVID antiviral rx prior to 5 days of sxs  Pcp follow up this week if not improving or worsening  ER visit anytime 24/7 for acute worsening or changing condition

## 2024-02-27 NOTE — PATIENT INSTRUCTIONS
Tylenol as directed for pain or fever  Fluids and rest  6 feet rule, wear mask and wash hands and surfaces  Home quarantine for 5 days from start of symptoms  Take to pcp if want COVID antiviral rx, must start within 5 days of symptoms  Pcp follow up this week if not improving or worsening  ER visit anytime 24/7 for acute worsening or chaging condii   n/a

## 2024-03-04 ENCOUNTER — OFFICE VISIT (OUTPATIENT)
Dept: ORTHOPEDIC SURGERY | Facility: CLINIC | Age: 54
End: 2024-03-04
Payer: COMMERCIAL

## 2024-03-04 DIAGNOSIS — G56.03 BILATERAL CARPAL TUNNEL SYNDROME: ICD-10-CM

## 2024-03-04 DIAGNOSIS — M19.049 CMC ARTHRITIS: Primary | ICD-10-CM

## 2024-03-04 PROBLEM — M18.11 UNILATERAL PRIMARY OSTEOARTHRITIS OF FIRST CARPOMETACARPAL JOINT, RIGHT HAND: Status: ACTIVE | Noted: 2024-03-04

## 2024-03-04 PROBLEM — Z01.818 PRE-OP EXAM: Status: ACTIVE | Noted: 2024-03-04

## 2024-03-04 PROBLEM — G56.01 CARPAL TUNNEL SYNDROME, RIGHT UPPER LIMB: Status: ACTIVE | Noted: 2024-03-04

## 2024-03-04 PROCEDURE — 3008F BODY MASS INDEX DOCD: CPT | Performed by: ORTHOPAEDIC SURGERY

## 2024-03-04 PROCEDURE — 99214 OFFICE O/P EST MOD 30 MIN: CPT | Performed by: ORTHOPAEDIC SURGERY

## 2024-03-04 NOTE — PROGRESS NOTES
3/4/2024    Chief Complaint   Patient presents with    Left Thumb - Pain    Right Thumb - Pain     Bi lat thumb cmc  Bi lat CTS  S/p inj 2/5/24       History of Present Illness:  Patient Elena Michelle , 53 y.o. female, presents today, 3/4/2024, for evaluation of bilateral hand and thumb pain, swelling, and numbness.  Jose Enrique complains of bilateral numbness and tingling and basilar thumb pain worse with pinch and .  Symptoms are worse on the right in comparison to the left.  She went and injections to the bilateral carpal tunnels and bilateral CMC's 4 weeks ago.  She states on the right side symptom medic relief only lasted 2 to 3 days and then returned, on the left they only last about 1 to 2 weeks and she is back to baseline.  She is having clumsiness, dropping things secondary to pain and discomfort.  The numbness wakes her from sleep at nighttime.  She is inquiring about surgical intervention.  She is wearing a brace on the right hand when she comes in today.       Review of Systems:   GENERAL: Negative  GI: Negative  MUSCULOSKELETAL: See HPI  SKIN: Negative  NEURO:  Negative     Physical Exam:  GENERAL:  Alert and oriented to person, place, and time.  No acute distress and breathing comfortably; pleasant and cooperative with the examination.  HEENT:  Head is normocephalic and atraumatic.  NECK:  Supple, no visible swelling.  CARDIOVASCULAR:  No palpable tachycardia.  LUNGS:  No audible wheezing or labored breathing.  ABDOMEN:  Nondistended.  Extremities: Evaluation of bilateral upper extremities finds the patient to have a palpable radial artery at the wrist with brisk capillary refill to all digits. The patient has intact sensorium to axillary, radial, median and ulnar nerves. There are no open wounds. There are no signs of infection. There is no evidence of lymphedema or lymphatic streaking. The patient has supple compartments of the bilateral arms, forearms and hands.  Positive Tinel's over the median  nerves bilaterally.  Tenderness palpation of the bilateral thumb CMC articulations more so on the right than the left.  Positive basilar grind test bilaterally.     Imaging/Test Results:  None today.  Previous radiographs show significant bilateral thumb CMC osteoarthritic change.     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  Bilateral carpal tunnel syndrome worse on the right compared to the left recalcitrant to nonoperative treatment strategies.     Plan:  Operative and nonoperative treatment strategies were reviewed.  On the left patient elects for continued nighttime or in daytime bracing as needed and observation for now.  Symptoms are worse on the right and she would like to return her attention to that. A long discussion with the patient regarding right thumb CMC arthroplasty with possible tendon transfer with concomitant right carpal tunnel release.  At this point, I discussed the risks/benefits/expected outcomes of observation versus surgery.  The risks/benefits of surgery were reviewed. The risks include, but are not limited to, infection, bleeding, nerve/vessel injury, stiffness, arthritis and anaesthetic complications. I have answered all questions regarding the surgery itself and the post-operative course. The patient consented to surgery.  Follow-up with our office 10 to 14 days postop we will perform a Occupational Therapy visit with that follow-up to move forward with range of motion recovery and custom splint fabrication.  All questions answered at today's visit.    In a face to face encounter, I performed a history and physical examination, discussed pertinent diagnostic studies if indicated, and discussed diagnosis and management strategies with both the patient and the mid-level provider. I reviewed the mid-level's note and agree with the documented findings and plan of care.      Patient  presents today for evaluation of bilateral thumb CMC arthritis and bilateral carpal tunnel syndrome.  Steroid injections worked for the carpal tunnel but only lasted for short time on the right.  She got about 2 weeks of relief on the left.  She is more symptomatic on the right as compared to the left.  Positive Tinel's over the median nerve to bilateral wrist.  Positive basilar grind test to bilateral thumbs.  Treatment options were discussed.  Patient elects for surgery for right thumb CMC arthroplasty.  Plan for mini tight rope construct on hold but not necessarily use it.  Plan for right carpal tunnel release as well.

## 2024-03-04 NOTE — LETTER
March 4, 2024     Patient: Elena Michelle   YOB: 1970   Date of Visit: 3/4/2024       To Whom It May Concern:    Elena Michelle is scheduled for surgery on 03/27/2024.  Please excuse her from work from 03/27/2024 through 04/09/2024.  She is scheduled for a post operative appointment on 04/09/2024 and her return to work will be evaluated then.    If you have any questions or concerns, please don't hesitate to call, [phone].         Sincerely,        Sen Perez, DO

## 2024-03-06 NOTE — CONSULTS
Service:   Service: Gastroenterology     Consult:  Consult requested by (Attending Name): Surjit Rodriguez   Reason: pancolitis, crohns     History of Present Illness:   HPI:    JEROME DILLON is a 53 year old Female with past medical history of Crohn's disease, chronic sinusitis  and nasal obstruction with bilateral sinus/nasal surgery who presented for abdominal pain and diarrhea.  Patient states she has been experiencing abdominal pain and watery diarrhea (20+ bowel movements  per day) for the past 10 days.  She is also been experiencing nausea/vomiting and very minimal oral intake as she cannot keep any food down.  She denies any weight loss during this period.  Patient has a history of Crohn's disease and follows with Dr. Parnell.  She has been having GI issues for about 15 years, however she was diagnosed with Crohn's approximately 2 years ago when her symptoms became very severe.  She was supposed to be started on Stelara, however this was in the middle of the COVID pandemic  and she was concerned about the immunosuppressive effects at the time, which deterred her from starting therapy.  Patient states her current symptoms are consistent with her Crohn's in the past, however this is the most severe it has been.    Patient has been started on cefepime/Flagyl and administered IV fluids and pain medication.  CT A/P significant for wall thickening and edema of the distal/ terminal ileum and development of pancolitis.  She states her symptoms have improved, her last  bowel movement was last night.  GI consulted for further management.    Past medical history: As above  Past surgical history: As above, cholecystectomy, tonsillectomy, hysterectomy  Social history:  Smokes a pack of cigarettes per day, denies alcohol or illicit drug use.  Lives with family.  Family history: Father: Lung disease,  no history of diagnosed Crohn's disease     Review of Systems (Bold = positive)  Constitutional; Fever, chills,  anorexia, weight loss, weakness   Eyes:  Blurry vision, diplopia, vision loss  ENT: Nasal congestion, earache or sore throat  Chest: Cough, dyspnea, hemoptysis, wheezing, pleurisy, Chest Pain,  palpitations, dyspnea on exertion, orthopnea  Gastrointestinal: Abdominal pain, nausea, vomiting, diarrhea, constipation, melena, hematemesis, hematochezia  Genitourinary: Dysuria, hematuria, frequency, urgency, flank pain  Musculoskeletal: Arthralgia, myalgia, weakness  Neurological: Dizziness, light-headedness, headache, syncope  Psychiatric: anxiety, depression, hallucinations  Skin: Rash, pruritis, rash, lesions  Endocrine: Heat intolerance, cold intolerance, polyuria, polydipsia  Hematologic: Bruising      Review Family/Social History and ROS:   Social History:    Smoking Status: light user (uses <10 cig/day, OR  <0.5 ppd, OR 1 can/pouch loose leaf tobacco per week, OR <0.5 vape pods per day) (1)   Alcohol Use: denies (1)   Drug Use: denies  (1)            Allergies:  ·  NSAIDs : Sneezing, Swelling/Edema  ·  hydrocodone : Unknown  ·  oxycodone : Unknown  ·  propoxyphene : Unknown    Objective:   Physical Exam Narrative:  ·  Physical Exam:    Physical Exam:  General: Alert, awake.  Cooperative.  HEENT:  Normocephalic, atraumatic, mucus membranes moist.   Neck:  Trachea midline.  No JVD.    Chest:  Clear to auscultation bilaterally. No wheezes, rales, or rhonchi.  CV:  Regular rate and rhythm.  Positive S1/S2. No murmur, no gallops, no rubs  GI: Bowel sounds present in all four quadrants, abdomen is soft,  non-distended.  Extremities:  No lower extremity edema or cyanosis.   Neurological:  AAOx3. No focal deficits.  Skin:  Warm and dry.    Medications:    Medications:          Continuous Medications       --------------------------------    1. Dextrose 5% - Lactated Ringers Infusion:  1000  mL  IntraVenous  <Continuous>         Scheduled Medications       --------------------------------    1. Cefepime IV Piggy Back:  2   gram(s)  IntraVenous Piggyback  Every 8 Hours    2. Enoxaparin SubCutaneous:  40  mg  SubCutaneous  Every 24 Hours    3. Influenza Virus QUADRIVALENT (Inactive) ADULT Vaccine:  0.5  mL  IntraMuscular  Once    4. metroNIDAZOLE (FLAGYL) 500 mg IVPB/ Premixed Soln 100 mL:  100  mL  IntraVenous Piggyback  Every 8 Hours    5. Nicotine 21 mg/ 24 hour TransDermal:  1  patch  TransDermal  Every 24 Hours    6. Pantoprazole Injectable:  40  mg  IntraVenous Push  Every 24 Hours         PRN Medications       --------------------------------    1. Morphine Injectable:  4  mg  IntraVenous Push  Every 4 Hours    2. Nicotine Transmucosal:  2  mg  Oral  Every 4 Hours    3. Ondansetron Injectable:  4  mg  IntraVenous Push  Every 6 Hours    4. Sodium Chloride 0.9% Injectable Flush:  10  mL  IntraVenous Flush  Every 8 Hours and as Needed        Radiology Results:    Results:        Impression:    Wall thickening and edema of segments of the distal ileum and  terminal ileum compatible with enteritis and patient's known  inflammatory bowel disease. There is also interval development of  diffuse pancolonic wall thickening and edema compatible with  pancolitis.     MACRO:  None     CT Abdomen and Pelvis with IV Contrast [Sep 24 2023 12:19AM]        Assessment:    53 year old Female with past medical history of Crohn's disease, chronic sinusitis and nasal obstruction  with bilateral sinus/nasal surgery who presented for abdominal pain and diarrhea.     #Acute Crohn's flare vs infectious colitis  -Obtain stool pathogen and C. difficile PCR.  -Advance to clear liquid diet  -Can start daily budesonide for Crohn's flare pending negative infectious work-up and continue until outpatient follow-up with Dr. Parnell to discuss further therapeutic options.    Ede Cannon D.O.  Internal Medicine PGY-3  [id number] or [place]    This is a preliminary note with physician attestation to follow.        Consult Status:  Consult Status    (select all that  "apply): initial  consult complete, will follow   Consult Order ID: [id number]     Attestation:   Note Completion:  I am a:  Resident/Fellow   Attending Attestation I saw and evaluated the patient.  I personally obtained the key and critical portions of the history and physical exam or was physically present for key and  critical portions performed by the resident/fellow. I reviewed the resident/fellow?s documentation and discussed the patient with the resident/fellow.  I agree with the resident/fellow?s medical decision making as documented in the note.     I personally evaluated the patient on 25-Sep-2023   Comments/ Additional Findings    Patient here with ileocolonic colitis. Recommend steroids after ruling out C diff/ UC. Needs to establish care with Dr. Parnell again with long term  treatment           Electronic Signatures:  Ede Cannon (DO (Resident))  (Signed 25-Sep-2023 15:24)   Authored: Service, History of Present Illness, Review  Family/Social History and ROS, Allergies, Objective, Assessment/Recommendations, Note Completion  Henna Vinson)  (Signed 26-Sep-2023 15:13)   Authored: Assessment/Recommendations, Note Completion   Co-Signer: Service, History of Present Illness, Review Family/Social History and ROS, Allergies, Objective, Assessment/Recommendations,  Note Completion      Last Updated: 26-Sep-2023 15:13 by Henna Vinson)    References:  1.  Data Referenced From \"Patient Profile - Adult v2\" 24-Sep-2023 18:48   "

## 2024-03-07 ENCOUNTER — LAB (OUTPATIENT)
Dept: LAB | Facility: LAB | Age: 54
End: 2024-03-07
Payer: COMMERCIAL

## 2024-03-07 DIAGNOSIS — Z01.818 PRE-OP EXAM: ICD-10-CM

## 2024-03-07 LAB
ALBUMIN SERPL BCP-MCNC: 4.4 G/DL (ref 3.4–5)
ALP SERPL-CCNC: 97 U/L (ref 33–110)
ALT SERPL W P-5'-P-CCNC: 22 U/L (ref 7–45)
ANION GAP SERPL CALC-SCNC: 12 MMOL/L (ref 10–20)
AST SERPL W P-5'-P-CCNC: 19 U/L (ref 9–39)
BASOPHILS # BLD AUTO: 0.03 X10*3/UL (ref 0–0.1)
BASOPHILS NFR BLD AUTO: 0.4 %
BILIRUB SERPL-MCNC: 0.7 MG/DL (ref 0–1.2)
BUN SERPL-MCNC: 10 MG/DL (ref 6–23)
CALCIUM SERPL-MCNC: 10.3 MG/DL (ref 8.6–10.3)
CHLORIDE SERPL-SCNC: 103 MMOL/L (ref 98–107)
CO2 SERPL-SCNC: 30 MMOL/L (ref 21–32)
CREAT SERPL-MCNC: 0.76 MG/DL (ref 0.5–1.05)
EGFRCR SERPLBLD CKD-EPI 2021: >90 ML/MIN/1.73M*2
EOSINOPHIL # BLD AUTO: 0.39 X10*3/UL (ref 0–0.7)
EOSINOPHIL NFR BLD AUTO: 4.6 %
ERYTHROCYTE [DISTWIDTH] IN BLOOD BY AUTOMATED COUNT: 13.2 % (ref 11.5–14.5)
GLUCOSE SERPL-MCNC: 124 MG/DL (ref 74–99)
HCT VFR BLD AUTO: 42.9 % (ref 36–46)
HGB BLD-MCNC: 14.1 G/DL (ref 12–16)
IMM GRANULOCYTES # BLD AUTO: 0.07 X10*3/UL (ref 0–0.7)
IMM GRANULOCYTES NFR BLD AUTO: 0.8 % (ref 0–0.9)
LYMPHOCYTES # BLD AUTO: 4.1 X10*3/UL (ref 1.2–4.8)
LYMPHOCYTES NFR BLD AUTO: 48.1 %
MCH RBC QN AUTO: 29.2 PG (ref 26–34)
MCHC RBC AUTO-ENTMCNC: 32.9 G/DL (ref 32–36)
MCV RBC AUTO: 89 FL (ref 80–100)
MONOCYTES # BLD AUTO: 0.44 X10*3/UL (ref 0.1–1)
MONOCYTES NFR BLD AUTO: 5.2 %
NEUTROPHILS # BLD AUTO: 3.49 X10*3/UL (ref 1.2–7.7)
NEUTROPHILS NFR BLD AUTO: 40.9 %
NRBC BLD-RTO: 0 /100 WBCS (ref 0–0)
PLATELET # BLD AUTO: 390 X10*3/UL (ref 150–450)
POTASSIUM SERPL-SCNC: 4.6 MMOL/L (ref 3.5–5.3)
PROT SERPL-MCNC: 7.1 G/DL (ref 6.4–8.2)
RBC # BLD AUTO: 4.83 X10*6/UL (ref 4–5.2)
SODIUM SERPL-SCNC: 140 MMOL/L (ref 136–145)
WBC # BLD AUTO: 8.5 X10*3/UL (ref 4.4–11.3)

## 2024-03-07 PROCEDURE — 80053 COMPREHEN METABOLIC PANEL: CPT

## 2024-03-07 PROCEDURE — 85025 COMPLETE CBC W/AUTO DIFF WBC: CPT

## 2024-03-07 PROCEDURE — 36415 COLL VENOUS BLD VENIPUNCTURE: CPT

## 2024-03-25 RX ORDER — OXYCODONE AND ACETAMINOPHEN 5; 325 MG/1; MG/1
1 TABLET ORAL EVERY 8 HOURS PRN
Qty: 20 TABLET | Refills: 0 | Status: SHIPPED | OUTPATIENT
Start: 2024-03-25 | End: 2024-04-01

## 2024-03-25 NOTE — DISCHARGE INSTRUCTIONS
Medication given may have significant effects after discharge. Therefore on the day of surgery:  1) You must be accompanied by a responsible adult upon discharge and for 24 hours after surgery.  Do not drive a motor vehicle, operate machinery, power tools or appliance, drink alcoholic beverages, or make critical decisions for 24 hours.  2) Be aware of dizziness, which may cause a fall. Change positions slowly.  3) Eating: you may resume your regular diet but it is better to increase intake slowly with mild foods and working up to your regular diet. No greasy, fried or spicy foods today.  4) Nausea/Vomiting: Nausea and vomiting may occur as you become more active or begin to increase food intake. If this should happen, decrease activity and return to liquids. If the problem persists, call your surgeon  5) Pain: Your surgeon may have given you a prescription for pain medication. Take pain medication with food as prescribed. Pain medication may cause constipation, so drink plenty of fluids. If your pain medication does not provide adequate relief, call your surgeon  6) Urinating: Notify your surgeon if you have not urinated within 12 hours after discharge  7) Ice: Apply ice to operative site for 20 min 5-6 times a day or use Polar care as instructed  8) Dressing:   []  Remove dressing in 3 Days   []  Leave open to air or apply simple Band-Aid after initial dressing is taken off and incision is dry. (If Steri-Strips are applied, leave them in place.)   []  No baths, hots tubs, pools, or submerging in fresh water sources. Okay to begin showering and normal hand washing after dressing removal.     [x]  Leave dressing in place. Keep dressing/ incision clean and dry.      9) Activity:    Shoulder/ Elbow/Hand:   [x]  Elevate extremity    [x]  Sling   [] At all times (except for exercises and showering)  [x] As needed only for comfort   [] Begin daily motion exercises out of sling as instructed   [x]  Bend and flex  fingers/wrist/elbow frequently   [x] Non-weight bearing/No lifting/gripping/squeezing to the surgical limb   [] No lifting greater than 1 lb until follow-up visit      10) Begin physical therapy if advised by your physician:   [] Before returning to see you doctor    [] Will discuss possible need at follow-up visit   [x] Will be paired with your follow-up visit in [place]    11) Call your doctor at [phone] for an appointment (or follow up as scheduled)    Contact [facility] office if  Increased redness, swelling, drainage of any kind, and/or pain to surgery site.  As well as new onset fevers and or chills.  These could signify an infection.  Calf or thigh tenderness to touch as well as increased swelling or redness.  This could signify a clot formation.  Numbness or tingling to an area around the incision site or below the incision site (toes). Or if the operative extremity becomes cold, blue.  Any rash appears, increased  or new onset nausea/vomiting occur.  This may indicate a reaction to a medication.  Temp is 38.5 C (101F)  12) If you have any concerns or questions, please call [facility] surgeon on call. The 24- hour phone is [phone]  13) If you are unable to contact your surgeon, in an emergency situation, go to the nearest hospital

## 2024-03-27 ENCOUNTER — APPOINTMENT (OUTPATIENT)
Dept: RADIOLOGY | Facility: HOSPITAL | Age: 54
End: 2024-03-27
Payer: COMMERCIAL

## 2024-03-27 ENCOUNTER — ANESTHESIA EVENT (OUTPATIENT)
Dept: OPERATING ROOM | Facility: HOSPITAL | Age: 54
End: 2024-03-27
Payer: COMMERCIAL

## 2024-03-27 ENCOUNTER — HOSPITAL ENCOUNTER (OUTPATIENT)
Facility: HOSPITAL | Age: 54
Setting detail: OUTPATIENT SURGERY
Discharge: HOME | End: 2024-03-27
Attending: ORTHOPAEDIC SURGERY | Admitting: ORTHOPAEDIC SURGERY
Payer: COMMERCIAL

## 2024-03-27 ENCOUNTER — ANESTHESIA (OUTPATIENT)
Dept: OPERATING ROOM | Facility: HOSPITAL | Age: 54
End: 2024-03-27
Payer: COMMERCIAL

## 2024-03-27 VITALS
OXYGEN SATURATION: 97 % | SYSTOLIC BLOOD PRESSURE: 106 MMHG | BODY MASS INDEX: 31.15 KG/M2 | TEMPERATURE: 97.5 F | DIASTOLIC BLOOD PRESSURE: 65 MMHG | WEIGHT: 165 LBS | HEART RATE: 67 BPM | RESPIRATION RATE: 18 BRPM | HEIGHT: 61 IN

## 2024-03-27 DIAGNOSIS — G56.01 CARPAL TUNNEL SYNDROME, RIGHT UPPER LIMB: ICD-10-CM

## 2024-03-27 DIAGNOSIS — Z01.818 PRE-OP EXAM: ICD-10-CM

## 2024-03-27 DIAGNOSIS — M18.11 UNILATERAL PRIMARY OSTEOARTHRITIS OF FIRST CARPOMETACARPAL JOINT, RIGHT HAND: ICD-10-CM

## 2024-03-27 DIAGNOSIS — G89.18 POST-OP PAIN: Primary | ICD-10-CM

## 2024-03-27 PROCEDURE — 25447 ARTHRP NTRCRP/CRP/MTCR NTRPS: CPT | Performed by: PHYSICIAN ASSISTANT

## 2024-03-27 PROCEDURE — 2500000004 HC RX 250 GENERAL PHARMACY W/ HCPCS (ALT 636 FOR OP/ED): Performed by: ANESTHESIOLOGY

## 2024-03-27 PROCEDURE — 2720000007 HC OR 272 NO HCPCS: Performed by: ORTHOPAEDIC SURGERY

## 2024-03-27 PROCEDURE — 3600000008 HC OR TIME - EACH INCREMENTAL 1 MINUTE - PROCEDURE LEVEL THREE: Performed by: ORTHOPAEDIC SURGERY

## 2024-03-27 PROCEDURE — 2500000004 HC RX 250 GENERAL PHARMACY W/ HCPCS (ALT 636 FOR OP/ED): Performed by: NURSE ANESTHETIST, CERTIFIED REGISTERED

## 2024-03-27 PROCEDURE — A64718 PR REVISE ULNAR NERVE AT ELBOW: Performed by: ANESTHESIOLOGY

## 2024-03-27 PROCEDURE — 3600000003 HC OR TIME - INITIAL BASE CHARGE - PROCEDURE LEVEL THREE: Performed by: ORTHOPAEDIC SURGERY

## 2024-03-27 PROCEDURE — 7100000009 HC PHASE TWO TIME - INITIAL BASE CHARGE: Performed by: ORTHOPAEDIC SURGERY

## 2024-03-27 PROCEDURE — 25447 ARTHRP NTRCRP/CRP/MTCR NTRPS: CPT | Performed by: ORTHOPAEDIC SURGERY

## 2024-03-27 PROCEDURE — 3700000002 HC GENERAL ANESTHESIA TIME - EACH INCREMENTAL 1 MINUTE: Performed by: ORTHOPAEDIC SURGERY

## 2024-03-27 PROCEDURE — 64721 CARPAL TUNNEL SURGERY: CPT | Performed by: ORTHOPAEDIC SURGERY

## 2024-03-27 PROCEDURE — A64718 PR REVISE ULNAR NERVE AT ELBOW: Performed by: NURSE ANESTHETIST, CERTIFIED REGISTERED

## 2024-03-27 PROCEDURE — 3700000001 HC GENERAL ANESTHESIA TIME - INITIAL BASE CHARGE: Performed by: ORTHOPAEDIC SURGERY

## 2024-03-27 PROCEDURE — 7100000010 HC PHASE TWO TIME - EACH INCREMENTAL 1 MINUTE: Performed by: ORTHOPAEDIC SURGERY

## 2024-03-27 PROCEDURE — 2500000004 HC RX 250 GENERAL PHARMACY W/ HCPCS (ALT 636 FOR OP/ED): Performed by: PHYSICIAN ASSISTANT

## 2024-03-27 PROCEDURE — 25310 TRANSPLANT FOREARM TENDON: CPT | Performed by: PHYSICIAN ASSISTANT

## 2024-03-27 PROCEDURE — 25310 TRANSPLANT FOREARM TENDON: CPT | Performed by: ORTHOPAEDIC SURGERY

## 2024-03-27 RX ORDER — SODIUM CHLORIDE, SODIUM LACTATE, POTASSIUM CHLORIDE, CALCIUM CHLORIDE 600; 310; 30; 20 MG/100ML; MG/100ML; MG/100ML; MG/100ML
100 INJECTION, SOLUTION INTRAVENOUS CONTINUOUS
Status: DISCONTINUED | OUTPATIENT
Start: 2024-03-27 | End: 2024-03-27 | Stop reason: HOSPADM

## 2024-03-27 RX ORDER — LIDOCAINE HYDROCHLORIDE 10 MG/ML
0.1 INJECTION, SOLUTION EPIDURAL; INFILTRATION; INTRACAUDAL; PERINEURAL ONCE
Status: DISCONTINUED | OUTPATIENT
Start: 2024-03-27 | End: 2024-03-27 | Stop reason: HOSPADM

## 2024-03-27 RX ORDER — MIDAZOLAM HYDROCHLORIDE 1 MG/ML
INJECTION, SOLUTION INTRAMUSCULAR; INTRAVENOUS AS NEEDED
Status: DISCONTINUED | OUTPATIENT
Start: 2024-03-27 | End: 2024-03-27

## 2024-03-27 RX ORDER — MEPERIDINE HYDROCHLORIDE 50 MG/ML
12.5 INJECTION INTRAMUSCULAR; INTRAVENOUS; SUBCUTANEOUS EVERY 10 MIN PRN
Status: DISCONTINUED | OUTPATIENT
Start: 2024-03-27 | End: 2024-03-27 | Stop reason: HOSPADM

## 2024-03-27 RX ORDER — CEFAZOLIN SODIUM 2 G/100ML
2 INJECTION, SOLUTION INTRAVENOUS ONCE
Status: COMPLETED | OUTPATIENT
Start: 2024-03-27 | End: 2024-03-27

## 2024-03-27 RX ORDER — HYDRALAZINE HYDROCHLORIDE 20 MG/ML
5 INJECTION INTRAMUSCULAR; INTRAVENOUS EVERY 30 MIN PRN
Status: DISCONTINUED | OUTPATIENT
Start: 2024-03-27 | End: 2024-03-27 | Stop reason: HOSPADM

## 2024-03-27 RX ORDER — ONDANSETRON HYDROCHLORIDE 2 MG/ML
4 INJECTION, SOLUTION INTRAVENOUS ONCE AS NEEDED
Status: DISCONTINUED | OUTPATIENT
Start: 2024-03-27 | End: 2024-03-27 | Stop reason: HOSPADM

## 2024-03-27 RX ORDER — PROPOFOL 10 MG/ML
INJECTION, EMULSION INTRAVENOUS AS NEEDED
Status: DISCONTINUED | OUTPATIENT
Start: 2024-03-27 | End: 2024-03-27

## 2024-03-27 RX ORDER — LABETALOL HYDROCHLORIDE 5 MG/ML
5 INJECTION, SOLUTION INTRAVENOUS ONCE AS NEEDED
Status: DISCONTINUED | OUTPATIENT
Start: 2024-03-27 | End: 2024-03-27 | Stop reason: HOSPADM

## 2024-03-27 RX ORDER — OXYCODONE HYDROCHLORIDE 5 MG/1
5 TABLET ORAL EVERY 4 HOURS PRN
Status: DISCONTINUED | OUTPATIENT
Start: 2024-03-27 | End: 2024-03-27 | Stop reason: HOSPADM

## 2024-03-27 RX ORDER — FAMOTIDINE 10 MG/ML
20 INJECTION INTRAVENOUS ONCE
Status: DISCONTINUED | OUTPATIENT
Start: 2024-03-27 | End: 2024-03-27 | Stop reason: HOSPADM

## 2024-03-27 RX ORDER — ALBUTEROL SULFATE 0.83 MG/ML
2.5 SOLUTION RESPIRATORY (INHALATION) ONCE AS NEEDED
Status: DISCONTINUED | OUTPATIENT
Start: 2024-03-27 | End: 2024-03-27 | Stop reason: HOSPADM

## 2024-03-27 RX ORDER — OXYCODONE HYDROCHLORIDE 10 MG/1
10 TABLET ORAL EVERY 4 HOURS PRN
Status: DISCONTINUED | OUTPATIENT
Start: 2024-03-27 | End: 2024-03-27 | Stop reason: HOSPADM

## 2024-03-27 RX ORDER — DIPHENHYDRAMINE HYDROCHLORIDE 50 MG/ML
12.5 INJECTION INTRAMUSCULAR; INTRAVENOUS ONCE AS NEEDED
Status: DISCONTINUED | OUTPATIENT
Start: 2024-03-27 | End: 2024-03-27 | Stop reason: HOSPADM

## 2024-03-27 RX ORDER — ONDANSETRON HYDROCHLORIDE 2 MG/ML
INJECTION, SOLUTION INTRAVENOUS AS NEEDED
Status: DISCONTINUED | OUTPATIENT
Start: 2024-03-27 | End: 2024-03-27

## 2024-03-27 RX ORDER — METOCLOPRAMIDE HYDROCHLORIDE 5 MG/ML
10 INJECTION INTRAMUSCULAR; INTRAVENOUS ONCE AS NEEDED
Status: DISCONTINUED | OUTPATIENT
Start: 2024-03-27 | End: 2024-03-27 | Stop reason: HOSPADM

## 2024-03-27 RX ADMIN — PROPOFOL 80 MCG/KG/MIN: 10 INJECTION, EMULSION INTRAVENOUS at 12:05

## 2024-03-27 RX ADMIN — ONDANSETRON 4 MG: 2 INJECTION INTRAMUSCULAR; INTRAVENOUS at 12:06

## 2024-03-27 RX ADMIN — CEFAZOLIN SODIUM 2 G: 2 INJECTION, SOLUTION INTRAVENOUS at 12:06

## 2024-03-27 RX ADMIN — MIDAZOLAM 2 MG: 1 INJECTION INTRAMUSCULAR; INTRAVENOUS at 11:30

## 2024-03-27 RX ADMIN — PROPOFOL 30 MG: 10 INJECTION, EMULSION INTRAVENOUS at 12:12

## 2024-03-27 RX ADMIN — SODIUM CHLORIDE, POTASSIUM CHLORIDE, SODIUM LACTATE AND CALCIUM CHLORIDE: 600; 310; 30; 20 INJECTION, SOLUTION INTRAVENOUS at 11:59

## 2024-03-27 RX ADMIN — PROPOFOL 30 MG: 10 INJECTION, EMULSION INTRAVENOUS at 12:05

## 2024-03-27 ASSESSMENT — PAIN SCALES - GENERAL
PAINLEVEL_OUTOF10: 0 - NO PAIN
PAINLEVEL_OUTOF10: 0 - NO PAIN
PAIN_LEVEL: 0
PAINLEVEL_OUTOF10: 0 - NO PAIN

## 2024-03-27 ASSESSMENT — COLUMBIA-SUICIDE SEVERITY RATING SCALE - C-SSRS
1. IN THE PAST MONTH, HAVE YOU WISHED YOU WERE DEAD OR WISHED YOU COULD GO TO SLEEP AND NOT WAKE UP?: NO
2. HAVE YOU ACTUALLY HAD ANY THOUGHTS OF KILLING YOURSELF?: NO
6. HAVE YOU EVER DONE ANYTHING, STARTED TO DO ANYTHING, OR PREPARED TO DO ANYTHING TO END YOUR LIFE?: NO

## 2024-03-27 ASSESSMENT — PAIN - FUNCTIONAL ASSESSMENT
PAIN_FUNCTIONAL_ASSESSMENT: 0-10

## 2024-03-27 NOTE — INTERVAL H&P NOTE
H&P reviewed. The patient was examined and there are no changes to the H&P.    ROS otherwise negative unless noted in HPI.

## 2024-03-27 NOTE — ANESTHESIA PROCEDURE NOTES
Peripheral Block    Patient location during procedure: pre-op  Start time: 3/27/2024 11:28 AM  End time: 3/27/2024 11:31 AM  Reason for block: at surgeon's request  Staffing  Performed: attending   Authorized by: Porfirio Aguilera DO    Performed by: Porfirio Aguilera DO  Preanesthetic Checklist  Completed: patient identified, IV checked, site marked, risks and benefits discussed, surgical consent, monitors and equipment checked, pre-op evaluation and timeout performed   Timeout performed at: 3/27/2024 11:28 AM  Peripheral Block  Patient position: sitting  Prep: ChloraPrep  Patient monitoring: heart rate and continuous pulse ox  Block type: supraclavicular  Laterality: right  Injection technique: single-shot  Local infiltration: ropivacaine  Infiltration strength: 0.5 %  Dose: 20 mL  Needle  Needle type: pencil-tip   Needle gauge: 21 G  Needle localization: ultrasound guidance     image stored in chart  Assessment  Injection assessment: negative aspiration for heme, no paresthesia on injection, incremental injection and local visualized surrounding nerve on ultrasound

## 2024-03-27 NOTE — ANESTHESIA PREPROCEDURE EVALUATION
Patient: Elena Michelle    Procedure Information       Date/Time: 03/27/24 1220    Procedure: RIGHT CARPAL TUNNEL RELEASE, RIGHT CARPOMETACARPAL ARTHROPLASTY WITH POSSIBLE TENDON TRANSFER (Right: Wrist) - REGIONAL BLOCK DONE BY ANESTHESIA TEAM    Location: [facility] OR  / [facility]    Surgeons: Sen Perez, DO            Relevant Problems   Neuro   (+) Carpal tunnel syndrome, right upper limb      GI   (+) Chronic diarrhea   (+) Crohn disease (CMS/HCC)      Liver   (+) Fatty liver      Musculoskeletal   (+) Carpal tunnel syndrome, right upper limb   (+) Unilateral primary osteoarthritis of first carpometacarpal joint, right hand      HEENT   (+) Chronic ethmoidal sinusitis   (+) Chronic maxillary sinusitis      ID   (+) Acute URI   (+) Viral syndrome       Clinical information reviewed:   Tobacco  Allergies  Meds   Med Hx  Surg Hx  OB Status  Fam Hx  Soc   Hx        NPO Detail:  NPO/Void Status  Carbohydrate Drink Given Prior to Surgery? : N  Date of Last Liquid: 03/26/24  Time of Last Liquid: 2200  Date of Last Solid: 03/26/24  Time of Last Solid: 2200  Last Intake Type: Food  Time of Last Void: 0930         Physical Exam    Airway  Mallampati: II  TM distance: >3 FB     Cardiovascular   Rhythm: regular  Rate: normal     Dental - normal exam  (+) upper dentures     Pulmonary   Breath sounds clear to auscultation     Abdominal        Anesthesia Plan    History of general anesthesia?: yes  History of complications of general anesthesia?: no    ASA 2     regional and general     intravenous induction   Postoperative administration of opioids is intended.  Anesthetic plan and risks discussed with patient.    Plan discussed with CRNA.

## 2024-03-27 NOTE — ANESTHESIA POSTPROCEDURE EVALUATION
Patient: Elena Michelle    Procedure Summary       Date: 03/27/24 Room / Location: [facility] OR 05 / [facility]    Anesthesia Start: 1159 Anesthesia Stop: 1304    Procedure: RIGHT CARPAL TUNNEL RELEASE, RIGHT CARPOMETACARPAL ARTHROPLASTY WITH POSSIBLE TENDON TRANSFER (Right: Wrist) Diagnosis:       Carpal tunnel syndrome, right upper limb      Unilateral primary osteoarthritis of first carpometacarpal joint, right hand      Pre-op exam      (Carpal tunnel syndrome, right upper limb [G56.01])      (Unilateral primary osteoarthritis of first carpometacarpal joint, right hand [M18.11])      (Pre-op exam [Z01.818])    Surgeons: Sen Perez DO Responsible Provider: Porfirio Aguilera DO    Anesthesia Type: regional, general ASA Status: 2            Anesthesia Type: regional, general    Vitals Value Taken Time   BP 93/51 03/27/24 1307   Temp 36.8 03/27/24 1307   Pulse 77 03/27/24 1307   Resp 12 03/27/24 1307   SpO2 96 03/27/24 1307       Anesthesia Post Evaluation    Patient location during evaluation: PACU  Patient participation: complete - patient participated  Level of consciousness: sleepy but conscious and awake  Pain score: 0  Pain management: adequate  Multimodal analgesia pain management approach  Airway patency: patent  Cardiovascular status: acceptable  Respiratory status: acceptable, room air, spontaneous ventilation and unassisted  Hydration status: acceptable  Postoperative Nausea and Vomiting: none  Comments: Handoff to PACU RN in phase 2        No notable events documented.

## 2024-03-27 NOTE — OP NOTE
RIGHT CARPAL TUNNEL RELEASE, RIGHT CARPOMETACARPAL ARTHROPLASTY WITH POSSIBLE TENDON TRANSFER (R) Operative Note     Date: 3/27/2024  OR Location: [facility]    Name: Elena Michelle, : 1970, Age: 53 y.o., MRN: [id number], Sex: female      Preoperative diagnosis: Right thumb CMC osteoarthritis.  Right hand first webspace contracture.  Right carpal tunnel syndrome.    Postoperative diagnosis: Right thumb CMC osteoarthritis.  Right hand first webspace contracture.  Right carpal tunnel syndrome.    Procedure planned: Right thumb CMC arthroplasty with end to side tendon transfer of abductor pollicis longus to flexor carpi radialis.  Right carpal tunnel release.    Procedure performed: Right thumb CMC arthroplasty with end to side tendon transfer of abductor pollicis longus to flexor carpi radialis.  Right carpal tunnel release.    Surgeon: Sen Perez D.O.    Assistant: DEMETRIO Rivera  The physician assistant was present to the entire case. Given the nature of the disease process and the procedure to be performed a skilled surgical assistant was necessary during the case. The assistant was necessary in order to hold retractors and directly assist in the operation. A certified scrub tech was at the back table managing instruments and supplies for the surgical case.    Anesthesia: General    Estimated blood loss: Less than 20 mL    Drains: None    Tourniquet: Approximately 30 minutes at 250 mmHg to the well-padded upper arm    Specimens: None    Implants: None    Indications: The patient presented to the office with painful arthritis affecting right thumb CMC articulation.  The patient experienced failure of nonoperative treatment strategies.  After full discussion regarding risks benefits alternatives the patient elected to proceed forth with surgery by way of right thumb CMC arthroplasty with tendon transfer.  Informed consent was signed and placed in the chart.    Complications: None noted at the time of  surgery    Description of operation: The patient was taken to the operative suite and placed in the supine position on the operating table.  A timeout was performed and the right hand was confirmed to be the operative site.  The patient was carefully positioned on the table in such a fashion as to pad all bony prominences and peripheral nerves.  The patient was administered appropriate IV antibiotics and general anesthesia.  The patient was then prepped and draped in the normal sterile fashion.  After prepping and draping an apex volar chevron shaped incision was marked out over the base of the thumb centered over the CMC articulation.  A 1 cm incision was marked out over the metaphyseal diaphyseal junction of the dorsal second metacarpal slightly ulnar to midline.  A sterile Esmarch was then used to exsanguinate the upper extremity and tourniquet was raised to 250 mmHg. The 15 blade was then used to incise skin over the base of the thumb.  Tenotomy scissors were used to gently dissect down to the subcutaneous plane, taking care to identify and protect the dorsal sensory branches of the radial nerve.  The sensory branches were protected through the remainder of the case.  We then developed the interval between the extensor pollicis brevis and abductor pollicis longus.  With the tendons retracted we created a full-thickness capsular incision down to bone directly overlying the CMC articulation.  Sharp dissection was used to release the soft tissue attachments to the trapezium.  An osteotome was then used to quarter the trapezium and the trapezium fragments were then removed in a piecemeal fashion with the Rongeur.  Care was taken to avoid iatrogenic injury to the underlying FCR tendon and capsular structures.  2-0 Ethibond suture was then used to perform end to side tendon transfer of flexor carpi radialis to abductor pollicis longus.  Care was taken to take bites within the FCR tendon close to its insertion site.  A  sling was created by way of a grasping stitch within the abductor pollicis longus suturing it to the FCR tendon.  Not only did this provide a sling that secured axial length of the construct but it also allowed the metacarpal to be positioned in greater abduction and extension, a more functional position.  Attention was then turned to the carpal tunnel release.  The 15 blade was used to incise skin directly overlying the transverse carpal ligament in line with the ring finger ray.  Dissection was carried sharply through the subcutaneous plane.  The longitudinal fibers of the palmar aponeurosis were split in line with their fibers exposing the transverse fibers of the transverse carpal ligament.  These were then meticulously released working from distal to proximal taking care to avoid iatrogenic injury to the underlying contents of the carpal tunnel.  The tenotomy scissors were then used to release the most proximal fibers of the transverse carpal ligament along with the most distal fibers of the antebrachial fascia.  This was done under direct visualization.  The distal release of the carpal tunnel was carried to the level of the fat change.  Both direct inspection and digital palpation confirmed complete release of the carpal tunnel.  The tourniquet was deflated.  Meticulous hemostasis was achieved.  Vital stitches were used to close the capsular layer over the CMC articulation.  Interrupted nylon stitches were used to close the skin.  The patient was then placed into a nonadherent dressing and thumb spica short arm plaster splint.  The patient was then allowed to arise from general anesthesia and taken to recovery in stable condition.  Overall the patient tolerated the procedure well.    Disposition: Stable to PACU              Sen Perez  Phone Number: [phone]

## 2024-04-09 ENCOUNTER — HOSPITAL ENCOUNTER (OUTPATIENT)
Dept: RADIOLOGY | Facility: CLINIC | Age: 54
Discharge: HOME | End: 2024-04-09
Payer: COMMERCIAL

## 2024-04-09 ENCOUNTER — OFFICE VISIT (OUTPATIENT)
Dept: ORTHOPEDIC SURGERY | Facility: CLINIC | Age: 54
End: 2024-04-09
Payer: COMMERCIAL

## 2024-04-09 ENCOUNTER — EVALUATION (OUTPATIENT)
Dept: OCCUPATIONAL THERAPY | Facility: CLINIC | Age: 54
End: 2024-04-09
Payer: COMMERCIAL

## 2024-04-09 DIAGNOSIS — G56.03 BILATERAL CARPAL TUNNEL SYNDROME: Primary | ICD-10-CM

## 2024-04-09 DIAGNOSIS — M25.60 JOINT STIFFNESS: ICD-10-CM

## 2024-04-09 DIAGNOSIS — M19.049 CMC ARTHRITIS: ICD-10-CM

## 2024-04-09 DIAGNOSIS — G56.02 LEFT CARPAL TUNNEL SYNDROME: Primary | ICD-10-CM

## 2024-04-09 PROCEDURE — 3008F BODY MASS INDEX DOCD: CPT | Performed by: ORTHOPAEDIC SURGERY

## 2024-04-09 PROCEDURE — 99024 POSTOP FOLLOW-UP VISIT: CPT | Performed by: ORTHOPAEDIC SURGERY

## 2024-04-09 PROCEDURE — 97165 OT EVAL LOW COMPLEX 30 MIN: CPT | Mod: GO

## 2024-04-09 PROCEDURE — L3808 WHFO, RIGID W/O JOINTS: HCPCS

## 2024-04-09 PROCEDURE — 97110 THERAPEUTIC EXERCISES: CPT | Mod: GO

## 2024-04-09 PROCEDURE — 73140 X-RAY EXAM OF FINGER(S): CPT | Mod: RIGHT SIDE | Performed by: ORTHOPAEDIC SURGERY

## 2024-04-09 PROCEDURE — 73140 X-RAY EXAM OF FINGER(S): CPT | Mod: RT

## 2024-04-09 NOTE — PROGRESS NOTES
Occupational Therapy Upper Extremity Evaluation Note    Date: 4/9/2024  Time in: 1206  Time Out:1232  Total Time: 26 minutes    Elena Michelle   YOB: 1970   MRN: [id number]    Referring Physician: Cora Rachel PA-C   for: Orthosis and ROM, edema management  Diagnosis: Right CMC arthritis and right carpal tunnel syndrome  Date of  Surgery: 3/27/2024    (1 week and 6 days post-surgery right CMC arthroplasty and right carpal tunnel release)  Precautions: NWB    Insurance  Visit Number:1  Visits Allowed: 30  Authorization: not needed  Date Range:n/a    Subjective  Function: Patient is a  and is off of work. Patient reports her  helps with getting her dressed and bathing. Hobbies are cake decorating.  Pain and Location: 5/10 achy, numb,  and burning  Chief Complaint: Patient reports no complaints.  Patient's goal for therapy: Patient states she would like to deal again for work.  Outcome Measure:  Initial evaluation Quick DASH 81.82    Objective  Observation: Patient guarding her hand  Skin/ Wound/Scar: Sutures out this date. Both scars moderately adhered and scars tender.  Edema: Mild to moderate edema right thumb and dorsum of hand.  Sensation: Patient reports numbness/tingling middle finger to thumb, per patient. To be evaluated further.  Dexterity/ Coordination: Buttoning, tying, zipping are difficult and doing her hair, per patient.    Upper Extremity ROM   Elbow flex/extension: full  Forearm supination/pronation: full   Wrist Extension/Flexion 40/30  Radial deviation/ulnar deviation 15/25    Hand ROM  AROM   THUMB      Kapandji 0    Palmar Abduction 45    Radial Abduction 55     Finger (DPC)        Index Middle Ring Small    5 3.4 2.5 2.5                  Hand Strength-NT                Gross grasp(dynamometer) (lbs)                             (2nd setting) Right         Left                Pinch (lbs)                             Key  right  left                            De Leon    right     left    ADLS/IADLS: Patient requires assist for dressing      Treatment Completed This Date: Thermoplastic custom thumb spica fabricated with purpose, wearing schedule, precautions, and care discussed. Patient instructed IN ROM exercises for thumb and wrist.      Assessment  Patient is a 54 y/o right hand dominate female diagnosed with right CMC arthritis and right carpal tunnel and is 1 week 6 days post-operative carpometacarpal arthroplasty and right carpal tunnel release. Patient reports pain and decreased independence with ADLs/ IADLs. Patient demonstrates decreased ROM, edema, tender and adhered scar,  newly healing structure requiring orthosis, and decreased independence with ADLs/IADLs  per Quick DASH score. Patient will benefit from attending occupational therapy to improve functional use of her right hand.     Plan of Care  Goals to be achieved by 6  weeks    Patient's level of independence with ADLs/ IADLs will improve by at least 50% per the quick DASH by discharge.    Patient will demonstrate full wrist  ROM to complete ADLs/IADLs independently by discharge.    Patient will demonstrate full fist  of digits to improve participation in ADLs/IADLs by discharge.    Patient will report understanding of home program, demonstrate independence and verbalize precautions.    Patient will report follow through with wearing of orthosis as instructed by therapist.    Patient will report pain free use of hand for completing ADLs/IADLS by discharge.    Patient's scar will be supple and demonstrate good healing that does not limit function by discharge.             Intervention    Therapeutic exercises, Therapeutic activity, manual therapy, orthosis, fluidotherapy, paraffin, taping, patient education, home program      Plan  Frequency1-2/ week  Duration 6 weeks    Patient and therapist developed goals for therapy and patient verbalizing agreement to plan of care

## 2024-04-09 NOTE — PROGRESS NOTES
4/9/2024    Chief Complaint   Patient presents with    Right Hand - Post-op     RT CTR, Rt thumb cmc arthroplasty  DOS: 3/27/24  Xrays today        History of Present Illness:  Patient Elena Michelle , 53 y.o. female, presents today, 4/9/2024, for evaluation of right hand  CMC arthroplasty with concomitant carpal tunnel release .  Overall she is done well since surgery.  Moderate discomfort.  She has been icing and elevating.  She reports continued numbness through median nerve distribution but it is mildly improved.       Review of Systems:   GENERAL: Negative  GI: Negative  MUSCULOSKELETAL: See HPI  SKIN: Negative  NEURO:  Negative     Physical Exam:  GENERAL:  Alert and oriented to person, place, and time.  No acute distress and breathing comfortably; pleasant and cooperative with the examination.  HEENT:  Head is normocephalic and atraumatic.  NECK:  Supple, no visible swelling.  CARDIOVASCULAR:  No palpable tachycardia.  LUNGS:  No audible wheezing or labored breathing.  ABDOMEN:  Nondistended.  Extremities: The surgical incision is clean, dry, intact, and appears to be healing well.  No active bleeding, erythema, warmth, drainage, or signs of infection.  Appropriate functional ROM demonstrated with flexion/extension of the digits, and flexion/extension/pronosupination of the wrist.     Imaging/Test Results:  3 views of the right thumb taken in the [place] office show no acute fracture or dislocation.  Surgical resection trapezium is noted.  Continued adequate alignment throughout all 3 planes.     Assessment:  Right thumb CMC arthroplasty with concomitant right carpal tunnel release, 2 weeks postop.     Plan:  Sutures were removed in the office today.  The patient will have restrictions of nonweightbearing to the right upper extremity.  We discussed and reviewed home exercise program for range of motion recovery, scar massage, and desensitization techniques.  Occupational Therapy referral order was  provided.  The patient will follow up with our office in 4 weeks, repeat x-rays 3 views of the right thumb upon return.  All patient questions answered at today's visit.    Cora Rachel PA-C

## 2024-04-16 ENCOUNTER — TREATMENT (OUTPATIENT)
Dept: OCCUPATIONAL THERAPY | Facility: CLINIC | Age: 54
End: 2024-04-16
Payer: COMMERCIAL

## 2024-04-16 DIAGNOSIS — G56.02 LEFT CARPAL TUNNEL SYNDROME: ICD-10-CM

## 2024-04-16 DIAGNOSIS — M25.60 JOINT STIFFNESS: ICD-10-CM

## 2024-04-16 PROCEDURE — 97110 THERAPEUTIC EXERCISES: CPT | Mod: 59,GO

## 2024-04-16 PROCEDURE — 97760 ORTHOTIC MGMT&TRAING 1ST ENC: CPT | Mod: GO

## 2024-04-16 NOTE — PROGRESS NOTES
Occupational Therapy Upper Extremity Progress Note    Date: 4/16/2024  Time in: 850  Time Out: 929  Total Time: 39 minutes  HC OT ORTHOTIC MGMT AND TRAINING, EACH 15 MIN 1 unit    HC OT THERAPEUTIC EXERCISES  05919 (CPT®) 2 units    Elena Michelle   YOB: 1970   MRN: [id number]    Referring Physician: Cora Rachel PA-C   for: Orthosis and ROM, edema management  Diagnosis: Right CMC arthritis and right carpal tunnel syndrome  Date of  Surgery: 3/27/2024    (2 week and 6 days post-surgery right CMC arthroplasty and right carpal tunnel release)  Precautions: NWB    Insurance  Visit Number:2  Visits Allowed: 30  Authorization: not needed  Date Range:n/a    Subjective  Patient states follow through with home program. She reports her swelling has gone down.  Function: Patient is a  and is off of work. Patient reports her  helps with getting her dressed and bathing. Hobbies are cake decorating.  Pain and Location: 0/10 , 4/10 achy, numb,  and burning  Chief Complaint: Patient reports no complaints.  Patient's goal for therapy: Patient states she would like to deal again for work.  Outcome Measure:  Initial evaluation Quick DASH 81.82    Objective  Observation: Patient to therapy with thumb spica on  Skin/ Wound/Scar: Both scars mildly adhered and scars tender.  Edema: Mild edema right thumb and dorsum of hand.  Sensation: Patient reports numbness middle finger to thumb, per patient. To be evaluated further.  Dexterity/ Coordination: Buttoning, tying, zipping aare difficult and doing her hair, per patient.    Upper Extremity ROM   Elbow flex/extension: full  Forearm supination/pronation: full   Wrist Extension/Flexion 60*/35*  Radial deviation/ulnar deviation 15/40*    Hand ROM  AROM   THUMB      Kapandji 4*    Palmar Abduction 70    Radial Abduction 60     Finger (DPC)        Index Middle Ring Small    2* 1.5* 1.8* DPC                  Hand Strength-NT                Gross grasp(dynamometer)  (lbs)                             (2nd setting) Right         Left                Pinch (lbs)                             Key  right  left                            De Leon   right     left    ADLS/IADLS: Patient requires assist for dressing      Treatment Completed This Date:  Re-evaluation of ROM  and discussed findings with patient. She completed wrist AAROM with ball and fine motor exercises. Orthosis checked and it was remolded. Reviewed home program.      Assessment  Patient is a 52 y/o right hand dominate female diagnosed with right CMC arthritis and right carpal tunnel and is 2 week 6 days post-operative carpometacarpal arthroplasty and right carpal tunnel release. Patient reports follow through with home program. ROM improved significantly. She tolerated all exercises well.    Plan of Care  Goals to be achieved by 6  weeks    Patient's level of independence with ADLs/ IADLs will improve by at least 50% per the quick DASH by discharge.    Patient will demonstrate full wrist  ROM to complete ADLs/IADLs independently by discharge.    Patient will demonstrate full fist  of digits to improve participation in ADLs/IADLs by discharge.    Patient will report understanding of home program, demonstrate independence and verbalize precautions.    Patient will report follow through with wearing of orthosis as instructed by therapist.    Patient will report pain free use of hand for completing ADLs/IADLS by discharge.    Patient's scar will be supple and demonstrate good healing that does not limit function by discharge.       Intervention    Therapeutic exercises, Therapeutic activity, manual therapy, orthosis, fluidotherapy, paraffin, taping, patient education, home program      Plan  Frequency 1-2/ week  Duration 6 weeks    Patient and therapist developed goals for therapy and patient verbalizing agreement to plan of care

## 2024-04-23 ENCOUNTER — TREATMENT (OUTPATIENT)
Dept: OCCUPATIONAL THERAPY | Facility: CLINIC | Age: 54
End: 2024-04-23
Payer: COMMERCIAL

## 2024-04-23 DIAGNOSIS — M25.60 JOINT STIFFNESS: Primary | ICD-10-CM

## 2024-04-23 PROCEDURE — 97110 THERAPEUTIC EXERCISES: CPT | Mod: GO

## 2024-04-23 NOTE — PROGRESS NOTES
"Occupational Therapy Upper Extremity Progress Note    Date: 4/23/2024  Time in: 820  Time Out: 900  Total Time: 40 minutes      HC OT THERAPEUTIC EXERCISES  71253 (CPT®) 3 units    Elena Michelle   YOB: 1970   MRN: [id number]    Referring Physician: Cora Rachel PA-C   for: Orthosis and ROM, edema management  Diagnosis: Right CMC arthritis and right carpal tunnel syndrome  Date of  Surgery: 3/27/2024    (3 week and 6 days post-surgery right CMC arthroplasty and right carpal tunnel release)  Precautions: NWB    Insurance  Visit Number:2  Visits Allowed: 30  Authorization: not needed  Date Range: n/a    Subjective  Patient states follow through with home program. Patient stated \"I like using the ball for my wrist\". She reports her swelling has gone up in her right thumb.   Function: Patient is a  and is off of work. Patient reports her  helps with getting her dressed and bathing. Hobbies are cake decorating.  Pain and Location: 0/10 , once in a while she states she has shooting pain.  Chief Complaint: Patient reports no complaints.  Patient's goal for therapy: Patient states she would like to deal again for work.  Outcome Measure:  Initial evaluation Quick DASH 81.82    Objective  Observation: Patient to therapy with thumb spica on  Skin/ Wound/Scar: Both scars mildly adhered and scars tender.  Edema: Mild edema right thumb and dorsum of hand.  Sensation: Patient reports numbness middle finger to thumb, per patient. To be evaluated further.  Dexterity/ Coordination: Buttoning, tying, zipping aare difficult and doing her hair, per patient.    Upper Extremity ROM   Elbow flex/extension: full  Forearm supination/pronation: full   Wrist Extension/Flexion 70*/65*  Radial deviation/ulnar deviation 20*/40    Hand ROM  AROM   THUMB      Kapandji 5*    Palmar Abduction 70    Radial Abduction 70*     Finger (DPC)        Index Middle Ring Small    2* DPC DPC DPC                  Hand Strength-NT     "            Gross grasp(dynamometer) (lbs)                             (2nd setting) Right         Left                Pinch (lbs)                             Key  right  left                            De Leon   right     left    ADLS/IADLS: Patient states she is independent with dressing.      Treatment Completed This Date:  Re-evaluation of ROM  and discussed findings with patient.  She received fluidotherapy while completing sign language to improve thumb and wrist ROM. She completed wrist AAROM with ball and AROM  wrist with juxta exerciser. She completed fine motor finger translation exercises with lacing shapes. Orthosis checked and it is fitting well. Reviewed home program. Thumb opposition improved to position 6.       Assessment  Patient is a 54 y/o right hand dominate female diagnosed with right CMC arthritis and right carpal tunnel and is 3 week 6 days post-operative carpometacarpal arthroplasty and right carpal tunnel release. Patient reports follow through with home program. ROM improved significantly for wrist flexion with gains also noted for thumb opposition.  Patient states she is pain free. She tolerated all exercises well and continues to benefit from receiving skilled occupational therapy.    Plan of Care  Goals to be achieved by 6  weeks    Patient's level of independence with ADLs/ IADLs will improve by at least 50% per the quick DASH by discharge.    Patient will demonstrate full wrist  ROM to complete ADLs/IADLs independently by discharge.    Patient will demonstrate full fist  of digits to improve participation in ADLs/IADLs by discharge.    Patient will report understanding of home program, demonstrate independence and verbalize precautions.    Patient will report follow through with wearing of orthosis as instructed by therapist.    Patient will report pain free use of hand for completing ADLs/IADLS by discharge. Met    Patient's scar will be supple and demonstrate good healing that does  not limit function by discharge.       Intervention    Therapeutic exercises, Therapeutic activity, manual therapy, orthosis, fluidotherapy, paraffin, taping, patient education, home program      Plan  Frequency 1-2/ week  Duration 6 weeks    Patient and therapist developed goals for therapy and patient verbalizing agreement to plan of care

## 2024-04-30 ENCOUNTER — TREATMENT (OUTPATIENT)
Dept: OCCUPATIONAL THERAPY | Facility: CLINIC | Age: 54
End: 2024-04-30
Payer: COMMERCIAL

## 2024-04-30 DIAGNOSIS — M25.60 JOINT STIFFNESS: Primary | ICD-10-CM

## 2024-04-30 PROCEDURE — 97110 THERAPEUTIC EXERCISES: CPT | Mod: GO

## 2024-04-30 NOTE — PROGRESS NOTES
"Occupational Therapy Upper Extremity Progress Note    Date: 4/23/2024  Time in: 849  Time out: 930  Total time: 41 minutes      HC OT THERAPEUTIC EXERCISES  31711 (CPT®) 3 units    DC HFO WITHOUT JOINTS PRE CST      Elena Michelle   YOB: 1970   MRN: [id number]    Referring Physician: Cora Rachel PA-C   for: Orthosis and ROM, edema management  Diagnosis: Right CMC arthritis and right carpal tunnel syndrome  Date of  Surgery: 3/27/2024    (4 week and 6 days post-surgery right CMC arthroplasty and right carpal tunnel release)  Precautions: NWB    Insurance  Visit Number:4  Visits Allowed: 30  Authorization: not needed  Date Range: n/a    Subjective  Patient states follow through with home program. Patient states \"Using the balls for my thumb is very hard\".   Function: Patient is a  and is off of work. Patient reports her  helps with getting her dressed and bathing. Hobbies are cake decorating.  Pain and Location: 0/10  Chief Complaint: Patient reports no complaints.  Patient's goal for therapy: Patient states she would like to deal again for work.  Outcome Measure:  Initial evaluation Quick DASH 81.82    Objective  Observation: Patient to therapy with thumb spica on  Skin/ Wound/Scar: Both scars minimally adhered, garcia scar raised and scars  no longer tender.  Edema: Mild edema right thumb and dorsum of hand.  Sensation: Patient reports numbness middle finger to thumb, per patient. To be evaluated further.  Dexterity/ Coordination: Buttoning, tying, zipping aare difficult and doing her hair, per patient.    Upper Extremity ROM   Elbow flex/extension: full  Forearm supination/pronation: full   Wrist Extension/Flexion 70*/70*  Radial deviation/ulnar deviation 30*/40    Hand ROM  AROM   THUMB      Kapandji 4    Palmar Abduction 70    Radial Abduction 70*     Finger (DPC)        Index Middle Ring Small    1* DPC DPC DPC                  Hand Strength-NT                Gross " "grasp(dynamometer) (lbs)                             (2nd setting) Right         Left                Pinch (lbs)                             Key  right  left                            De Leon   right     left    ADLS/IADLS: Patient states she is independent with dressing.      Treatment Completed This Date:  Re-evaluation of ROM  and discussed findings with patient.  She received fluidotherapy while completing ROM to improve thumb and wrist flexion ROM. She completed wrist AAROM with ball and AROM  wrist with juxta exerciser. She completed fine motor finger translation exercises with grooved pegboard. Orthosis checked and it is fitting well. Reviewed home program. Patient fabricated elastomer for raised carpal tunnel scar with purpose, wearing schedule, precautions, and care discussed. She was also instructed inn contrast baths for improving ROM and reducing edema.  Patient fitted with comfort cool with purpose, wearing schedule, precautions, and care discussed.Reviewed home program.  Thumb opposition returned to \"6\" at end of session.    Assessment  Patient is a 52 y/o right hand dominate female diagnosed with right CMC arthritis and right carpal tunnel and is 4 week 6 days post-operative carpometacarpal arthroplasty and right carpal tunnel release. Patient reports follow through with home program. . She tolerated all exercises well and continues to benefit from receiving skilled occupational therapy. Thumb opposition improved at end of session to \"6\".    Plan of Care  Goals to be achieved by 6  weeks    Patient's level of independence with ADLs/ IADLs will improve by at least 50% per the quick DASH by discharge.    Patient will demonstrate full wrist  ROM to complete ADLs/IADLs independently by discharge.    Patient will demonstrate full fist  of digits to improve participation in ADLs/IADLs by discharge.    Patient will report understanding of home program, demonstrate independence and verbalize " precautions.    Patient will report follow through with wearing of orthosis as instructed by therapist.    Patient will report pain free use of hand for completing ADLs/IADLS by discharge. Met    Patient's scar will be supple and demonstrate good healing that does not limit function by discharge.       Intervention    Therapeutic exercises, Therapeutic activity, manual therapy, orthosis, fluidotherapy, paraffin, taping, patient education, home program      Plan  Frequency 1-2/ week  Duration 6 weeks    Patient and therapist developed goals for therapy and patient verbalizing agreement to plan of care

## 2024-05-03 ENCOUNTER — TELEPHONE (OUTPATIENT)
Dept: SURGERY | Facility: CLINIC | Age: 54
End: 2024-05-03
Payer: COMMERCIAL

## 2024-05-03 NOTE — TELEPHONE ENCOUNTER
Pt has Crohn's, experiencing flare  up, lower abdominal pain, diarrhea, and then sharp pain x 1 week worsening over 2 days. States unable to go to ER, husbands out of town and has terminally ill father at home

## 2024-05-03 NOTE — TELEPHONE ENCOUNTER
Patient is having an extreme IBS flare up.  She would like to know if there is anything you can give to her?  Please advise.  Thank you.

## 2024-05-04 DIAGNOSIS — R19.7 DIARRHEA, UNSPECIFIED TYPE: Primary | ICD-10-CM

## 2024-05-04 DIAGNOSIS — R10.84 GENERALIZED ABDOMINAL PAIN: ICD-10-CM

## 2024-05-04 DIAGNOSIS — K52.9 ILEITIS: ICD-10-CM

## 2024-05-06 ENCOUNTER — LAB (OUTPATIENT)
Dept: LAB | Facility: LAB | Age: 54
End: 2024-05-06
Payer: COMMERCIAL

## 2024-05-06 DIAGNOSIS — R19.7 DIARRHEA, UNSPECIFIED TYPE: ICD-10-CM

## 2024-05-06 DIAGNOSIS — K52.9 ILEITIS: ICD-10-CM

## 2024-05-06 DIAGNOSIS — R10.84 GENERALIZED ABDOMINAL PAIN: ICD-10-CM

## 2024-05-06 LAB
ALBUMIN SERPL BCP-MCNC: 4.4 G/DL (ref 3.4–5)
ALP SERPL-CCNC: 89 U/L (ref 33–110)
ALT SERPL W P-5'-P-CCNC: 20 U/L (ref 7–45)
ANION GAP SERPL CALC-SCNC: 16 MMOL/L (ref 10–20)
AST SERPL W P-5'-P-CCNC: 20 U/L (ref 9–39)
BASOPHILS # BLD AUTO: 0.08 X10*3/UL (ref 0–0.1)
BASOPHILS NFR BLD AUTO: 0.5 %
BILIRUB SERPL-MCNC: 0.7 MG/DL (ref 0–1.2)
BUN SERPL-MCNC: 16 MG/DL (ref 6–23)
C COLI+JEJ+UPSA DNA STL QL NAA+PROBE: NOT DETECTED
C DIF TOX TCDA+TCDB STL QL NAA+PROBE: NOT DETECTED
CALCIUM SERPL-MCNC: 10.1 MG/DL (ref 8.6–10.3)
CHLORIDE SERPL-SCNC: 102 MMOL/L (ref 98–107)
CO2 SERPL-SCNC: 25 MMOL/L (ref 21–32)
CREAT SERPL-MCNC: 0.87 MG/DL (ref 0.5–1.05)
CRP SERPL-MCNC: 0.48 MG/DL
EC STX1 GENE STL QL NAA+PROBE: NOT DETECTED
EC STX2 GENE STL QL NAA+PROBE: NOT DETECTED
EGFRCR SERPLBLD CKD-EPI 2021: 81 ML/MIN/1.73M*2
EOSINOPHIL # BLD AUTO: 0.76 X10*3/UL (ref 0–0.7)
EOSINOPHIL NFR BLD AUTO: 4.4 %
ERYTHROCYTE [DISTWIDTH] IN BLOOD BY AUTOMATED COUNT: 13 % (ref 11.5–14.5)
GLUCOSE SERPL-MCNC: 220 MG/DL (ref 74–99)
HCT VFR BLD AUTO: 42.9 % (ref 36–46)
HGB BLD-MCNC: 13.9 G/DL (ref 12–16)
IMM GRANULOCYTES # BLD AUTO: 0.05 X10*3/UL (ref 0–0.7)
IMM GRANULOCYTES NFR BLD AUTO: 0.3 % (ref 0–0.9)
LYMPHOCYTES # BLD AUTO: 3.77 X10*3/UL (ref 1.2–4.8)
LYMPHOCYTES NFR BLD AUTO: 21.8 %
MCH RBC QN AUTO: 29.4 PG (ref 26–34)
MCHC RBC AUTO-ENTMCNC: 32.4 G/DL (ref 32–36)
MCV RBC AUTO: 91 FL (ref 80–100)
MONOCYTES # BLD AUTO: 0.68 X10*3/UL (ref 0.1–1)
MONOCYTES NFR BLD AUTO: 3.9 %
NEUTROPHILS # BLD AUTO: 11.98 X10*3/UL (ref 1.2–7.7)
NEUTROPHILS NFR BLD AUTO: 69.1 %
NOROVIRUS GI + GII RNA STL NAA+PROBE: NOT DETECTED
NRBC BLD-RTO: 0 /100 WBCS (ref 0–0)
PLATELET # BLD AUTO: 356 X10*3/UL (ref 150–450)
POTASSIUM SERPL-SCNC: 4.1 MMOL/L (ref 3.5–5.3)
PROT SERPL-MCNC: 7.1 G/DL (ref 6.4–8.2)
RBC # BLD AUTO: 4.72 X10*6/UL (ref 4–5.2)
RV RNA STL NAA+PROBE: NOT DETECTED
SALMONELLA DNA STL QL NAA+PROBE: NOT DETECTED
SHIGELLA DNA SPEC QL NAA+PROBE: NOT DETECTED
SODIUM SERPL-SCNC: 139 MMOL/L (ref 136–145)
V CHOLERAE DNA STL QL NAA+PROBE: NOT DETECTED
WBC # BLD AUTO: 17.3 X10*3/UL (ref 4.4–11.3)
Y ENTEROCOL DNA STL QL NAA+PROBE: NOT DETECTED

## 2024-05-06 PROCEDURE — 80053 COMPREHEN METABOLIC PANEL: CPT

## 2024-05-06 PROCEDURE — 83993 ASSAY FOR CALPROTECTIN FECAL: CPT

## 2024-05-06 PROCEDURE — 87328 CRYPTOSPORIDIUM AG IA: CPT

## 2024-05-06 PROCEDURE — 85025 COMPLETE CBC W/AUTO DIFF WBC: CPT

## 2024-05-06 PROCEDURE — 86140 C-REACTIVE PROTEIN: CPT

## 2024-05-06 PROCEDURE — 87329 GIARDIA AG IA: CPT

## 2024-05-06 PROCEDURE — 87493 C DIFF AMPLIFIED PROBE: CPT

## 2024-05-06 PROCEDURE — 87506 IADNA-DNA/RNA PROBE TQ 6-11: CPT

## 2024-05-06 PROCEDURE — 36415 COLL VENOUS BLD VENIPUNCTURE: CPT

## 2024-05-07 ENCOUNTER — APPOINTMENT (OUTPATIENT)
Dept: OCCUPATIONAL THERAPY | Facility: CLINIC | Age: 54
End: 2024-05-07
Payer: COMMERCIAL

## 2024-05-07 ENCOUNTER — HOSPITAL ENCOUNTER (OUTPATIENT)
Dept: RADIOLOGY | Facility: CLINIC | Age: 54
Discharge: HOME | End: 2024-05-07
Payer: COMMERCIAL

## 2024-05-07 ENCOUNTER — OFFICE VISIT (OUTPATIENT)
Dept: ORTHOPEDIC SURGERY | Facility: CLINIC | Age: 54
End: 2024-05-07
Payer: COMMERCIAL

## 2024-05-07 DIAGNOSIS — G56.03 BILATERAL CARPAL TUNNEL SYNDROME: Primary | ICD-10-CM

## 2024-05-07 DIAGNOSIS — M19.049 CMC ARTHRITIS: ICD-10-CM

## 2024-05-07 DIAGNOSIS — R10.9 ABDOMINAL PAIN, UNSPECIFIED ABDOMINAL LOCATION: Primary | ICD-10-CM

## 2024-05-07 PROCEDURE — 73140 X-RAY EXAM OF FINGER(S): CPT | Mod: RT

## 2024-05-07 PROCEDURE — 99024 POSTOP FOLLOW-UP VISIT: CPT | Performed by: ORTHOPAEDIC SURGERY

## 2024-05-07 PROCEDURE — 73140 X-RAY EXAM OF FINGER(S): CPT | Mod: RIGHT SIDE | Performed by: ORTHOPAEDIC SURGERY

## 2024-05-07 NOTE — TELEPHONE ENCOUNTER
Patient came in again today and indicated that she knows she is dehydrated, can't even drink water without throwing  up and she is even gets up during the night to throw up.  Please advise.

## 2024-05-07 NOTE — PROGRESS NOTES
5/7/2024    Chief Complaint   Patient presents with    Right Hand - Follow-up     Rt CTR  Rt thumb cmc arthroplasty  DOS: 3/27/24  Xrays today        History of Present Illness:  Patient Elena Michelle , 53 y.o. female, presents today, 5/7/2024, for evaluation of right hand and thumb  CMC arthroplasty with carpal tunnel release, 6 weeks postop .  She describes some occasional numbness and tingling of the thumb but overall things are improving.  She is working with therapy and motion recovery.  They are set to begin transitioning endurance and strengthening exercises today.       Review of Systems:   GENERAL: Negative  GI: Negative  MUSCULOSKELETAL: See HPI  SKIN: Negative  NEURO:  Negative     Physical Exam:  GENERAL:  Alert and oriented to person, place, and time.  No acute distress and breathing comfortably; pleasant and cooperative with the examination.  HEENT:  Head is normocephalic and atraumatic.  NECK:  Supple, no visible swelling.  CARDIOVASCULAR:  No palpable tachycardia.  LUNGS:  No audible wheezing or labored breathing.  ABDOMEN:  Nondistended.  Extremities: Evaluation of the right upper extremity finds the patient to have a palpable radial artery at the wrist with brisk capillary refill to all digits. The patient has intact sensorium to axillary, radial, median and ulnar nerves. There are no open wounds. There are no signs of infection. There is no evidence of lymphedema or lymphatic streaking. The patient has supple compartments of the right arm, forearm and hand.  Surgical incisions are well-healed.      Imaging/Test Results:  3 views of the right thumb taken in the office today show no acute fracture dislocation.  Previous surgical resection of trapezium is noted.  Good alignment throughout all 3 planes with no evidence of subsidence of CMC arthroplasty construct.     Assessment:  Right thumb CMC arthroplasty with carpal tunnel release, 6 weeks out.     Plan:  Patient can transition away from  splint and bracing immobilization.  She can progress to 1 to 2 pounds max weightbearing to the right upper extremity but continue with endurance and strengthening under the guidance of therapy.  In 4 weeks we will lift all restrictions and allow her to weight-bear as tolerated.  Continue work with therapy on motion recovery.  New requisition provided.  Follow-up with our office in 6 weeks for repeat clinical and radiographic exam, x-rays 3 views of the right thumb upon return.  All questions answered at today's visit.    Cora Rachel PA-C

## 2024-05-08 ENCOUNTER — HOSPITAL ENCOUNTER (EMERGENCY)
Facility: HOSPITAL | Age: 54
Discharge: HOME | End: 2024-05-08
Attending: EMERGENCY MEDICINE
Payer: COMMERCIAL

## 2024-05-08 ENCOUNTER — APPOINTMENT (OUTPATIENT)
Dept: RADIOLOGY | Facility: HOSPITAL | Age: 54
End: 2024-05-08
Payer: COMMERCIAL

## 2024-05-08 ENCOUNTER — APPOINTMENT (OUTPATIENT)
Dept: CARDIOLOGY | Facility: HOSPITAL | Age: 54
End: 2024-05-08
Payer: COMMERCIAL

## 2024-05-08 VITALS
HEIGHT: 61 IN | SYSTOLIC BLOOD PRESSURE: 103 MMHG | DIASTOLIC BLOOD PRESSURE: 60 MMHG | OXYGEN SATURATION: 97 % | BODY MASS INDEX: 29.64 KG/M2 | HEART RATE: 70 BPM | TEMPERATURE: 96.6 F | RESPIRATION RATE: 16 BRPM | WEIGHT: 157 LBS

## 2024-05-08 DIAGNOSIS — R10.84 GENERALIZED ABDOMINAL PAIN: Primary | ICD-10-CM

## 2024-05-08 LAB
ALBUMIN SERPL BCP-MCNC: 4.4 G/DL (ref 3.4–5)
ALP SERPL-CCNC: 87 U/L (ref 33–110)
ALT SERPL W P-5'-P-CCNC: 26 U/L (ref 7–45)
ANION GAP SERPL CALC-SCNC: 14 MMOL/L (ref 10–20)
AST SERPL W P-5'-P-CCNC: 33 U/L (ref 9–39)
BASOPHILS # BLD AUTO: 0.05 X10*3/UL (ref 0–0.1)
BASOPHILS NFR BLD AUTO: 0.5 %
BILIRUB SERPL-MCNC: 0.8 MG/DL (ref 0–1.2)
BUN SERPL-MCNC: 17 MG/DL (ref 6–23)
CALCIUM SERPL-MCNC: 10 MG/DL (ref 8.6–10.3)
CHLORIDE SERPL-SCNC: 105 MMOL/L (ref 98–107)
CO2 SERPL-SCNC: 23 MMOL/L (ref 21–32)
CREAT SERPL-MCNC: 0.72 MG/DL (ref 0.5–1.05)
EGFRCR SERPLBLD CKD-EPI 2021: >90 ML/MIN/1.73M*2
EOSINOPHIL # BLD AUTO: 0.38 X10*3/UL (ref 0–0.7)
EOSINOPHIL NFR BLD AUTO: 3.9 %
ERYTHROCYTE [DISTWIDTH] IN BLOOD BY AUTOMATED COUNT: 12.7 % (ref 11.5–14.5)
GLUCOSE SERPL-MCNC: 108 MG/DL (ref 74–99)
HCT VFR BLD AUTO: 40.6 % (ref 36–46)
HGB BLD-MCNC: 14.1 G/DL (ref 12–16)
IMM GRANULOCYTES # BLD AUTO: 0.02 X10*3/UL (ref 0–0.7)
IMM GRANULOCYTES NFR BLD AUTO: 0.2 % (ref 0–0.9)
INR PPP: 1.1 (ref 0.9–1.1)
LACTATE SERPL-SCNC: 0.8 MMOL/L (ref 0.4–2)
LYMPHOCYTES # BLD AUTO: 3.56 X10*3/UL (ref 1.2–4.8)
LYMPHOCYTES NFR BLD AUTO: 36.6 %
MCH RBC QN AUTO: 29.9 PG (ref 26–34)
MCHC RBC AUTO-ENTMCNC: 34.7 G/DL (ref 32–36)
MCV RBC AUTO: 86 FL (ref 80–100)
MONOCYTES # BLD AUTO: 0.59 X10*3/UL (ref 0.1–1)
MONOCYTES NFR BLD AUTO: 6.1 %
NEUTROPHILS # BLD AUTO: 5.12 X10*3/UL (ref 1.2–7.7)
NEUTROPHILS NFR BLD AUTO: 52.7 %
NRBC BLD-RTO: 0 /100 WBCS (ref 0–0)
O+P STL MICRO: NEGATIVE
PLATELET # BLD AUTO: 348 X10*3/UL (ref 150–450)
POTASSIUM SERPL-SCNC: 3.8 MMOL/L (ref 3.5–5.3)
PROT SERPL-MCNC: 7.6 G/DL (ref 6.4–8.2)
PROTHROMBIN TIME: 12.4 SECONDS (ref 9.8–12.8)
RBC # BLD AUTO: 4.72 X10*6/UL (ref 4–5.2)
SODIUM SERPL-SCNC: 138 MMOL/L (ref 136–145)
WBC # BLD AUTO: 9.7 X10*3/UL (ref 4.4–11.3)

## 2024-05-08 PROCEDURE — 93005 ELECTROCARDIOGRAM TRACING: CPT

## 2024-05-08 PROCEDURE — 2550000001 HC RX 255 CONTRASTS: Performed by: EMERGENCY MEDICINE

## 2024-05-08 PROCEDURE — 85610 PROTHROMBIN TIME: CPT | Performed by: EMERGENCY MEDICINE

## 2024-05-08 PROCEDURE — 36415 COLL VENOUS BLD VENIPUNCTURE: CPT | Performed by: EMERGENCY MEDICINE

## 2024-05-08 PROCEDURE — 74177 CT ABD & PELVIS W/CONTRAST: CPT

## 2024-05-08 PROCEDURE — 96375 TX/PRO/DX INJ NEW DRUG ADDON: CPT

## 2024-05-08 PROCEDURE — 85025 COMPLETE CBC W/AUTO DIFF WBC: CPT | Performed by: EMERGENCY MEDICINE

## 2024-05-08 PROCEDURE — 83605 ASSAY OF LACTIC ACID: CPT | Performed by: EMERGENCY MEDICINE

## 2024-05-08 PROCEDURE — 2500000004 HC RX 250 GENERAL PHARMACY W/ HCPCS (ALT 636 FOR OP/ED): Performed by: EMERGENCY MEDICINE

## 2024-05-08 PROCEDURE — 80053 COMPREHEN METABOLIC PANEL: CPT | Performed by: EMERGENCY MEDICINE

## 2024-05-08 PROCEDURE — 96374 THER/PROPH/DIAG INJ IV PUSH: CPT

## 2024-05-08 PROCEDURE — 2500000004 HC RX 250 GENERAL PHARMACY W/ HCPCS (ALT 636 FOR OP/ED)

## 2024-05-08 PROCEDURE — 99284 EMERGENCY DEPT VISIT MOD MDM: CPT

## 2024-05-08 PROCEDURE — 96361 HYDRATE IV INFUSION ADD-ON: CPT

## 2024-05-08 PROCEDURE — 74177 CT ABD & PELVIS W/CONTRAST: CPT | Performed by: RADIOLOGY

## 2024-05-08 RX ORDER — ONDANSETRON HYDROCHLORIDE 2 MG/ML
4 INJECTION, SOLUTION INTRAVENOUS ONCE
Status: COMPLETED | OUTPATIENT
Start: 2024-05-08 | End: 2024-05-08

## 2024-05-08 RX ORDER — MORPHINE SULFATE 4 MG/ML
INJECTION, SOLUTION INTRAMUSCULAR; INTRAVENOUS
Status: COMPLETED
Start: 2024-05-08 | End: 2024-05-08

## 2024-05-08 RX ORDER — MORPHINE SULFATE 4 MG/ML
4 INJECTION, SOLUTION INTRAMUSCULAR; INTRAVENOUS ONCE
Status: COMPLETED | OUTPATIENT
Start: 2024-05-08 | End: 2024-05-08

## 2024-05-08 RX ORDER — ONDANSETRON HYDROCHLORIDE 2 MG/ML
INJECTION, SOLUTION INTRAVENOUS
Status: COMPLETED
Start: 2024-05-08 | End: 2024-05-08

## 2024-05-08 RX ADMIN — SODIUM CHLORIDE 1000 ML: 9 INJECTION, SOLUTION INTRAVENOUS at 11:36

## 2024-05-08 RX ADMIN — ONDANSETRON 4 MG: 2 INJECTION INTRAMUSCULAR; INTRAVENOUS at 11:40

## 2024-05-08 RX ADMIN — MORPHINE SULFATE 4 MG: 4 INJECTION, SOLUTION INTRAMUSCULAR; INTRAVENOUS at 11:40

## 2024-05-08 RX ADMIN — ONDANSETRON 4 MG: 2 INJECTION, SOLUTION INTRAMUSCULAR; INTRAVENOUS at 11:40

## 2024-05-08 RX ADMIN — MORPHINE SULFATE 4 MG: 4 INJECTION, SOLUTION INTRAMUSCULAR; INTRAVENOUS at 11:36

## 2024-05-08 RX ADMIN — IOHEXOL 75 ML: 350 INJECTION, SOLUTION INTRAVENOUS at 13:15

## 2024-05-08 RX ADMIN — ONDANSETRON 4 MG: 2 INJECTION INTRAMUSCULAR; INTRAVENOUS at 11:36

## 2024-05-08 ASSESSMENT — COLUMBIA-SUICIDE SEVERITY RATING SCALE - C-SSRS
6. HAVE YOU EVER DONE ANYTHING, STARTED TO DO ANYTHING, OR PREPARED TO DO ANYTHING TO END YOUR LIFE?: NO
1. IN THE PAST MONTH, HAVE YOU WISHED YOU WERE DEAD OR WISHED YOU COULD GO TO SLEEP AND NOT WAKE UP?: NO
2. HAVE YOU ACTUALLY HAD ANY THOUGHTS OF KILLING YOURSELF?: NO

## 2024-05-08 ASSESSMENT — LIFESTYLE VARIABLES
EVER FELT BAD OR GUILTY ABOUT YOUR DRINKING: NO
HAVE PEOPLE ANNOYED YOU BY CRITICIZING YOUR DRINKING: NO
TOTAL SCORE: 0
HAVE YOU EVER FELT YOU SHOULD CUT DOWN ON YOUR DRINKING: NO
EVER HAD A DRINK FIRST THING IN THE MORNING TO STEADY YOUR NERVES TO GET RID OF A HANGOVER: NO

## 2024-05-08 ASSESSMENT — PAIN SCALES - GENERAL
PAINLEVEL_OUTOF10: 7
PAINLEVEL_OUTOF10: 2

## 2024-05-08 ASSESSMENT — PAIN - FUNCTIONAL ASSESSMENT: PAIN_FUNCTIONAL_ASSESSMENT: 0-10

## 2024-05-08 ASSESSMENT — PAIN DESCRIPTION - LOCATION: LOCATION: ABDOMEN

## 2024-05-08 NOTE — ED PROVIDER NOTES
HPI   Chief Complaint   Patient presents with    Abdominal Pain       53-year-old female who presents with abdominal pain vomiting and diarrhea.  Patient has a history of Crohn's disease she has been having abdominal pain for the past week with vomiting and diarrhea associated with it.  She does see Dr. Parnell for GI.  She states she feels dehydrated.  She denies any blood in her stool.  She has not started any treatment for her Crohn's.  She denies any fevers chills.  Denies any dysuria, frequency or urgency.                          Teresa Coma Scale Score: 15                     Patient History   Past Medical History:   Diagnosis Date    Aspirin sensitivity     BMI 31.0-31.9,adult     Chronic diarrhea     Chronic ethmoidal sinusitis     Chronic maxillary sinusitis     Crohn's colitis (Multi)     Crohn's colitis (Multi)     Decreased sense of smell     Fatty liver     Ileitis     Memory loss     Nasal polyp     Pain in left foot 2021    Left foot pain    Pain in right foot 2021    Right foot pain    Plantar fasciitis, bilateral     Post-nasal drip     Rhinorrhea      Past Surgical History:   Procedure Laterality Date     SECTION, CLASSIC      CHOLECYSTECTOMY      COLONOSCOPY      (Fiberoptic)    HYSTERECTOMY      NOSE SURGERY      Rhinologic surgery    TONSILLECTOMY       Family History   Problem Relation Name Age of Onset    No Known Problems Mother      No Known Problems Father      No Known Problems Sister      No Known Problems Brother      No Known Problems Daughter      No Known Problems Son      No Known Problems Mother's Sister      No Known Problems Mother's Brother      No Known Problems Father's Sister      No Known Problems Father's Brother      No Known Problems Maternal Grandmother      No Known Problems Maternal Grandfather      No Known Problems Paternal Grandmother      No Known Problems Paternal Grandfather      No Known Problems Other       Social History     Tobacco Use     Smoking status: Former     Types: Cigarettes    Smokeless tobacco: Never   Vaping Use    Vaping status: Never Used   Substance Use Topics    Alcohol use: Not Currently    Drug use: Never       Physical Exam   ED Triage Vitals [05/08/24 1010]   Temperature Heart Rate Respirations BP   35.9 °C (96.6 °F) (!) 106 18 127/89      Pulse Ox Temp Source Heart Rate Source Patient Position   96 % Tympanic Monitor Sitting      BP Location FiO2 (%)     Right arm --       Physical Exam  Constitutional:       Appearance: Normal appearance. She is normal weight.   HENT:      Head: Normocephalic and atraumatic.      Nose: Nose normal.      Mouth/Throat:      Mouth: Mucous membranes are moist.      Pharynx: Oropharynx is clear.   Eyes:      Extraocular Movements: Extraocular movements intact.      Conjunctiva/sclera: Conjunctivae normal.      Pupils: Pupils are equal, round, and reactive to light.   Cardiovascular:      Rate and Rhythm: Normal rate and regular rhythm.   Pulmonary:      Effort: Pulmonary effort is normal.      Breath sounds: Normal breath sounds.   Abdominal:      General: Abdomen is flat. Bowel sounds are normal.      Palpations: Abdomen is soft.      Comments: Diffusely tender to palpation.  No guarding no rebound.   Musculoskeletal:         General: Normal range of motion.      Cervical back: Normal range of motion and neck supple.   Skin:     General: Skin is warm and dry.      Capillary Refill: Capillary refill takes less than 2 seconds.   Neurological:      General: No focal deficit present.      Mental Status: She is alert.   Psychiatric:         Mood and Affect: Mood normal.         Behavior: Behavior normal.         Thought Content: Thought content normal.         Judgment: Judgment normal.       Labs Reviewed   COMPREHENSIVE METABOLIC PANEL - Abnormal       Result Value    Glucose 108 (*)     Sodium 138      Potassium 3.8      Chloride 105      Bicarbonate 23      Anion Gap 14      Urea Nitrogen 17       Creatinine 0.72      eGFR >90      Calcium 10.0      Albumin 4.4      Alkaline Phosphatase 87      Total Protein 7.6      AST 33      Bilirubin, Total 0.8      ALT 26     LACTATE - Normal    Lactate 0.8      Narrative:     Venipuncture immediately after or during the administration of Metamizole may lead to falsely low results. Testing should be performed immediately  prior to Metamizole dosing.   PROTIME-INR - Normal    Protime 12.4      INR 1.1     CBC WITH AUTO DIFFERENTIAL    WBC 9.7      nRBC 0.0      RBC 4.72      Hemoglobin 14.1      Hematocrit 40.6      MCV 86      MCH 29.9      MCHC 34.7      RDW 12.7      Platelets 348      Neutrophils % 52.7      Immature Granulocytes %, Automated 0.2      Lymphocytes % 36.6      Monocytes % 6.1      Eosinophils % 3.9      Basophils % 0.5      Neutrophils Absolute 5.12      Immature Granulocytes Absolute, Automated 0.02      Lymphocytes Absolute 3.56      Monocytes Absolute 0.59      Eosinophils Absolute 0.38      Basophils Absolute 0.05     URINALYSIS WITH REFLEX CULTURE AND MICROSCOPIC    Narrative:     The following orders were created for panel order Urinalysis with Reflex Culture and Microscopic.  Procedure                               Abnormality         Status                     ---------                               -----------         ------                     Urinalysis with Reflex C...[[id number]]                                                 Extra Urine Gray Tube[[id number]]                                                         Please view results for these tests on the individual orders.   URINALYSIS WITH REFLEX CULTURE AND MICROSCOPIC   EXTRA URINE GRAY TUBE       CT abdomen pelvis w IV contrast   Final Result   No acute intra-abdominal process.             Signed by: Madhu Bennett 5/8/2024 1:36 PM   Dictation workstation:   [id number]          ED Course & MDM   Diagnoses as of 05/08/24 1422   Generalized abdominal pain       Medical Decision  Making  Emergency department course, laboratory studies were obtained and reviewed which were unremarkable.  Patient has a normal white blood cell count.  CT abdomen pelvis was obtained which showed no acute intra-abdominal process.  I gave the patient IV fluids, morphine and Zofran.  On reevaluation she is feeling improved.  I did reach out to GI.  Patient is comfortable being discharged home she will follow-up as an outpatient.    Amount and/or Complexity of Data Reviewed  ECG/medicine tests: independent interpretation performed.     Details: EKG shows a sinus tachycardia at a rate of 103 with nonspecific ST-T wave changes, interpreted by ED physician.        Procedure  Procedures     Bianca Chen DO  05/08/24 1425

## 2024-05-08 NOTE — ED TRIAGE NOTES
Pt presents to ED for report of crohn's flare for the past 2 weeks. Pt reports abdominal pain, n/v/d. Pt believes she is dehydrated. PT denies CP, SOB

## 2024-05-09 LAB
CALPROTECTIN STL-MCNT: <5 UG/G
CRYPTOSP AG STL QL IA: NEGATIVE
G LAMBLIA AG STL QL IA: NEGATIVE

## 2024-05-10 DIAGNOSIS — R10.84 GENERALIZED ABDOMINAL PAIN: Primary | ICD-10-CM

## 2024-05-10 DIAGNOSIS — R11.2 NAUSEA AND VOMITING, UNSPECIFIED VOMITING TYPE: ICD-10-CM

## 2024-05-10 DIAGNOSIS — K50.919 CROHN'S DISEASE WITH COMPLICATION, UNSPECIFIED GASTROINTESTINAL TRACT LOCATION (MULTI): Primary | ICD-10-CM

## 2024-05-10 LAB
ATRIAL RATE: 103 BPM
P AXIS: 70 DEGREES
PR INTERVAL: 144 MS
Q ONSET: 253 MS
QRS COUNT: 17 BEATS
QRS DURATION: 88 MS
QT INTERVAL: 341 MS
QTC CALCULATION(BAZETT): 447 MS
QTC FREDERICIA: 408 MS
R AXIS: 84 DEGREES
T AXIS: 55 DEGREES
T OFFSET: 423 MS
VENTRICULAR RATE: 103 BPM

## 2024-05-10 RX ORDER — OMEPRAZOLE 40 MG/1
40 CAPSULE, DELAYED RELEASE ORAL DAILY
Qty: 30 CAPSULE | Refills: 11 | Status: SHIPPED | OUTPATIENT
Start: 2024-05-10 | End: 2025-05-10

## 2024-05-10 RX ORDER — ONDANSETRON HYDROCHLORIDE 8 MG/1
8 TABLET, FILM COATED ORAL EVERY 8 HOURS PRN
Qty: 30 TABLET | Refills: 5 | Status: SHIPPED | OUTPATIENT
Start: 2024-05-10 | End: 2024-07-09

## 2024-05-14 ENCOUNTER — TREATMENT (OUTPATIENT)
Dept: OCCUPATIONAL THERAPY | Facility: CLINIC | Age: 54
End: 2024-05-14
Payer: COMMERCIAL

## 2024-05-14 DIAGNOSIS — M25.60 JOINT STIFFNESS: Primary | ICD-10-CM

## 2024-05-14 PROCEDURE — 97110 THERAPEUTIC EXERCISES: CPT | Mod: GO

## 2024-05-14 NOTE — PROGRESS NOTES
Occupational Therapy Upper Extremity Progress Note    Date: 5/14/2024  Time in: 802  Time out: 846  Total time: 44 minutes      HC OT THERAPEUTIC EXERCISES  67750 (CPT®) 3 units    Elena Michelle   YOB: 1970   MRN: [id number]    Referring Physician: Cora Rachel PA-C   for: Orthosis and ROM, edema management  Diagnosis: Right CMC arthritis and right carpal tunnel syndrome  Date of  Surgery: 3/27/2024    (6 week and 6 days post-surgery right CMC arthroplasty and right carpal tunnel release)  Precautions: NWB    Insurance  Visit Number:5  Visits Allowed: 30  Authorization: not needed  Date Range: n/a    Subjective  Patient states follow through with home program. Patient states she is using her hand to make a doll house.  Function: Patient is a  and is off of work. Patient reports her  helps with getting her dressed and bathing. Hobbies are cake decorating.  Pain and Location: 0/10  Chief Complaint: Patient reports no complaints.  Patient's goal for therapy: Patient states she would like to deal again for work.  Outcome Measure:  Initial evaluation Quick DASH 81.82    Objective  Observation: Patient to therapy with comfort cool on.  Skin/ Wound/Scar: Thumb scar minimally adhered, garcia scar flat, but thumb scar raised.  Edema: Mild edema right thumb and dorsum of hand.  Sensation: Patient reports numbness middle finger to thumb, per patient. To be evaluated further.  Dexterity/ Coordination: Buttoning, tying, zipping are easier and doing her hair, per patient.    Upper Extremity ROM   Elbow flex/extension: full  Forearm supination/pronation: full   Wrist Extension/Flexion 70*/75*  Radial deviation/ulnar deviation 30*/40    Hand ROM  AROM   THUMB      Kapandji 5*    Palmar Abduction 70    Radial Abduction 70*     Finger (DPC)        Index Middle Ring Small    DPC* DPC DPC DPC                  Hand Strength                Gross grasp(dynamometer) (lbs)                             (2nd  setting) Right   15      Left 30                Pinch (lbs)                             Key  right  2 left 8                            De Leon   right 2    left 5    ADLS/IADLS: Patient states she is independent with dressing.      Treatment Completed This Date:  Re-evaluation of ROM  and  evaluated strength .iscussed findings with patient.  She received fluidotherapy while completing ROM to improve thumb and wrist flexion ROM. Initiated light  and pinch strengthening with super soft theraputty (tan) Patient instructed in and completed exercises correctly. She was cautioned not to overdo exercises. Patient practiced mixing cards for her work related task with difficulty. Patient completed hand strengthening with yellow theratube and pinch strengthening with tweezer board. Fabricated new elastomer for raised scar thumb scar. Purpose, wearing schedule, precautions and care discussed. Reviewed home program.      Assessment  Patient is a 54 y/o right hand dominate female diagnosed with right CMC arthritis and right carpal tunnel and is 6 week 6 days post-operative carpometacarpal arthroplasty and right carpal tunnel release. Patient reports follow through with home program.  She made gains in ROM for her thumb and and wrist. She tolerated all exercises including theraputty exercises. She continues to benefit from receiving skilled occupational therapy.     Plan of Care  Goals to be achieved by 6  weeks    Patient's level of independence with ADLs/ IADLs will improve by at least 50% per the quick DASH by discharge.    Patient will demonstrate full wrist  ROM to complete ADLs/IADLs independently by discharge. PM    Patient will demonstrate full fist  of digits to improve participation in ADLs/IADLs by discharge.    Patient will report understanding of home program, demonstrate independence and verbalize precautions.    Patient will report follow through with wearing of orthosis as instructed by  therapist.Met    Patient will report pain free use of hand for completing ADLs/IADLS by discharge. Met    Patient's scar will be supple and demonstrate good healing that does not limit function by discharge. Pm    Patient's gross grasp strength, per Dynamometer 2nd setting, will be within 5# of non-affected hand to complete ADLs/IADLs with increased independence by discharge.    Patient's pinch strengths will improve per pinch gauge to improve functional pinch strength for completing ADLs/IADLs by discharge.  Intervention    Therapeutic exercises, Therapeutic activity, manual therapy, orthosis, fluidotherapy, paraffin, taping, patient education, home program      Plan  Frequency 1-2/ week  Duration 6 weeks    Patient and therapist developed goals for therapy and patient verbalizing agreement to plan of care

## 2024-05-14 NOTE — H&P (VIEW-ONLY)
Occupational Therapy Upper Extremity Progress Note    Date: 5/14/2024  Time in: 802  Time out: 846  Total time: 44 minutes      HC OT THERAPEUTIC EXERCISES  90107 (CPT®) 3 units    Elena Michelle   YOB: 1970   MRN: [id number]    Referring Physician: Cora Rachel PA-C   for: Orthosis and ROM, edema management  Diagnosis: Right CMC arthritis and right carpal tunnel syndrome  Date of  Surgery: 3/27/2024    (6 week and 6 days post-surgery right CMC arthroplasty and right carpal tunnel release)  Precautions: NWB    Insurance  Visit Number:5  Visits Allowed: 30  Authorization: not needed  Date Range: n/a    Subjective  Patient states follow through with home program. Patient states she is using her hand to make a doll house.  Function: Patient is a  and is off of work. Patient reports her  helps with getting her dressed and bathing. Hobbies are cake decorating.  Pain and Location: 0/10  Chief Complaint: Patient reports no complaints.  Patient's goal for therapy: Patient states she would like to deal again for work.  Outcome Measure:  Initial evaluation Quick DASH 81.82    Objective  Observation: Patient to therapy with comfort cool on.  Skin/ Wound/Scar: Thumb scar minimally adhered, garcia scar flat, but thumb scar raised.  Edema: Mild edema right thumb and dorsum of hand.  Sensation: Patient reports numbness middle finger to thumb, per patient. To be evaluated further.  Dexterity/ Coordination: Buttoning, tying, zipping are easier and doing her hair, per patient.    Upper Extremity ROM   Elbow flex/extension: full  Forearm supination/pronation: full   Wrist Extension/Flexion 70*/75*  Radial deviation/ulnar deviation 30*/40    Hand ROM  AROM   THUMB      Kapandji 5*    Palmar Abduction 70    Radial Abduction 70*     Finger (DPC)        Index Middle Ring Small    DPC* DPC DPC DPC                  Hand Strength                Gross grasp(dynamometer) (lbs)                             (2nd  setting) Right   15      Left 30                Pinch (lbs)                             Key  right  2 left 8                            De Leon   right 2    left 5    ADLS/IADLS: Patient states she is independent with dressing.      Treatment Completed This Date:  Re-evaluation of ROM  and  evaluated strength .iscussed findings with patient.  She received fluidotherapy while completing ROM to improve thumb and wrist flexion ROM. Initiated light  and pinch strengthening with super soft theraputty (tan) Patient instructed in and completed exercises correctly. She was cautioned not to overdo exercises. Patient practiced mixing cards for her work related task with difficulty. Patient completed hand strengthening with yellow theratube and pinch strengthening with tweezer board. Fabricated new elastomer for raised scar thumb scar. Purpose, wearing schedule, precautions and care discussed. Reviewed home program.      Assessment  Patient is a 54 y/o right hand dominate female diagnosed with right CMC arthritis and right carpal tunnel and is 6 week 6 days post-operative carpometacarpal arthroplasty and right carpal tunnel release. Patient reports follow through with home program.  She made gains in ROM for her thumb and and wrist. She tolerated all exercises including theraputty exercises. She continues to benefit from receiving skilled occupational therapy.     Plan of Care  Goals to be achieved by 6  weeks    Patient's level of independence with ADLs/ IADLs will improve by at least 50% per the quick DASH by discharge.    Patient will demonstrate full wrist  ROM to complete ADLs/IADLs independently by discharge. PM    Patient will demonstrate full fist  of digits to improve participation in ADLs/IADLs by discharge.    Patient will report understanding of home program, demonstrate independence and verbalize precautions.    Patient will report follow through with wearing of orthosis as instructed by  therapist.Met    Patient will report pain free use of hand for completing ADLs/IADLS by discharge. Met    Patient's scar will be supple and demonstrate good healing that does not limit function by discharge. Pm    Patient's gross grasp strength, per Dynamometer 2nd setting, will be within 5# of non-affected hand to complete ADLs/IADLs with increased independence by discharge.    Patient's pinch strengths will improve per pinch gauge to improve functional pinch strength for completing ADLs/IADLs by discharge.  Intervention    Therapeutic exercises, Therapeutic activity, manual therapy, orthosis, fluidotherapy, paraffin, taping, patient education, home program      Plan  Frequency 1-2/ week  Duration 6 weeks    Patient and therapist developed goals for therapy and patient verbalizing agreement to plan of care

## 2024-05-15 ENCOUNTER — TELEPHONE (OUTPATIENT)
Dept: GASTROENTEROLOGY | Facility: CLINIC | Age: 54
End: 2024-05-15
Payer: COMMERCIAL

## 2024-05-15 DIAGNOSIS — Z12.11 COLON CANCER SCREENING: Primary | ICD-10-CM

## 2024-05-15 RX ORDER — SODIUM, POTASSIUM,MAG SULFATES 17.5-3.13G
1 SOLUTION, RECONSTITUTED, ORAL ORAL 2 TIMES DAILY
Qty: 354 ML | Refills: 0 | Status: SHIPPED | OUTPATIENT
Start: 2024-05-15

## 2024-05-15 NOTE — TELEPHONE ENCOUNTER
Patient needs prep resent, didn't  first one- She was bumped up to tomorrow from Dr Parnell- I did send a direct message to him but wanted to loop you in    SUPREP- Parmjit

## 2024-05-16 ENCOUNTER — ANESTHESIA EVENT (OUTPATIENT)
Dept: GASTROENTEROLOGY | Facility: HOSPITAL | Age: 54
End: 2024-05-16
Payer: COMMERCIAL

## 2024-05-16 ENCOUNTER — ANESTHESIA (OUTPATIENT)
Dept: GASTROENTEROLOGY | Facility: HOSPITAL | Age: 54
End: 2024-05-16
Payer: COMMERCIAL

## 2024-05-16 ENCOUNTER — APPOINTMENT (OUTPATIENT)
Dept: GASTROENTEROLOGY | Facility: HOSPITAL | Age: 54
End: 2024-05-16
Payer: COMMERCIAL

## 2024-05-16 ENCOUNTER — HOSPITAL ENCOUNTER (OUTPATIENT)
Dept: GASTROENTEROLOGY | Facility: HOSPITAL | Age: 54
Discharge: HOME | End: 2024-05-16
Payer: COMMERCIAL

## 2024-05-16 VITALS
HEART RATE: 71 BPM | RESPIRATION RATE: 16 BRPM | TEMPERATURE: 96.8 F | SYSTOLIC BLOOD PRESSURE: 113 MMHG | OXYGEN SATURATION: 97 % | DIASTOLIC BLOOD PRESSURE: 74 MMHG

## 2024-05-16 DIAGNOSIS — K50.919 CROHN'S DISEASE WITH COMPLICATION, UNSPECIFIED GASTROINTESTINAL TRACT LOCATION (MULTI): Primary | ICD-10-CM

## 2024-05-16 DIAGNOSIS — K50.919 CROHN'S DISEASE WITH COMPLICATION, UNSPECIFIED GASTROINTESTINAL TRACT LOCATION (MULTI): ICD-10-CM

## 2024-05-16 PROCEDURE — 88305 TISSUE EXAM BY PATHOLOGIST: CPT | Mod: TC,59,PARLAB | Performed by: STUDENT IN AN ORGANIZED HEALTH CARE EDUCATION/TRAINING PROGRAM

## 2024-05-16 PROCEDURE — 91110 GI TRC IMG INTRAL ESOPH-ILE: CPT | Performed by: ANESTHESIOLOGY

## 2024-05-16 PROCEDURE — 2720000007 HC OR 272 NO HCPCS

## 2024-05-16 PROCEDURE — A45380 PR COLONOSCOPY,BIOPSY: Performed by: NURSE ANESTHETIST, CERTIFIED REGISTERED

## 2024-05-16 PROCEDURE — 88305 TISSUE EXAM BY PATHOLOGIST: CPT | Performed by: SPECIALIST

## 2024-05-16 PROCEDURE — 3700000001 HC GENERAL ANESTHESIA TIME - INITIAL BASE CHARGE

## 2024-05-16 PROCEDURE — 7100000010 HC PHASE TWO TIME - EACH INCREMENTAL 1 MINUTE

## 2024-05-16 PROCEDURE — 7100000009 HC PHASE TWO TIME - INITIAL BASE CHARGE

## 2024-05-16 PROCEDURE — 2500000004 HC RX 250 GENERAL PHARMACY W/ HCPCS (ALT 636 FOR OP/ED): Performed by: STUDENT IN AN ORGANIZED HEALTH CARE EDUCATION/TRAINING PROGRAM

## 2024-05-16 PROCEDURE — 45380 COLONOSCOPY AND BIOPSY: CPT | Performed by: STUDENT IN AN ORGANIZED HEALTH CARE EDUCATION/TRAINING PROGRAM

## 2024-05-16 PROCEDURE — 2500000004 HC RX 250 GENERAL PHARMACY W/ HCPCS (ALT 636 FOR OP/ED): Performed by: NURSE ANESTHETIST, CERTIFIED REGISTERED

## 2024-05-16 PROCEDURE — 3700000002 HC GENERAL ANESTHESIA TIME - EACH INCREMENTAL 1 MINUTE

## 2024-05-16 PROCEDURE — A45380 PR COLONOSCOPY,BIOPSY: Performed by: ANESTHESIOLOGY

## 2024-05-16 PROCEDURE — 43239 EGD BIOPSY SINGLE/MULTIPLE: CPT | Performed by: STUDENT IN AN ORGANIZED HEALTH CARE EDUCATION/TRAINING PROGRAM

## 2024-05-16 PROCEDURE — 91110 GI TRC IMG INTRAL ESOPH-ILE: CPT | Performed by: STUDENT IN AN ORGANIZED HEALTH CARE EDUCATION/TRAINING PROGRAM

## 2024-05-16 RX ORDER — BUDESONIDE 3 MG/1
9 CAPSULE, COATED PELLETS ORAL EVERY MORNING
Qty: 90 CAPSULE | Refills: 5 | Status: SHIPPED | OUTPATIENT
Start: 2024-05-16 | End: 2024-11-12

## 2024-05-16 RX ORDER — PROPOFOL 10 MG/ML
INJECTION, EMULSION INTRAVENOUS CONTINUOUS PRN
Status: DISCONTINUED | OUTPATIENT
Start: 2024-05-16 | End: 2024-05-16

## 2024-05-16 RX ORDER — SODIUM CHLORIDE, SODIUM LACTATE, POTASSIUM CHLORIDE, CALCIUM CHLORIDE 600; 310; 30; 20 MG/100ML; MG/100ML; MG/100ML; MG/100ML
20 INJECTION, SOLUTION INTRAVENOUS CONTINUOUS
Status: DISCONTINUED | OUTPATIENT
Start: 2024-05-16 | End: 2024-05-17 | Stop reason: HOSPADM

## 2024-05-16 RX ADMIN — PROPOFOL 30 MG: 10 INJECTION, EMULSION INTRAVENOUS at 09:55

## 2024-05-16 RX ADMIN — SODIUM CHLORIDE, POTASSIUM CHLORIDE, SODIUM LACTATE AND CALCIUM CHLORIDE 20 ML/HR: 600; 310; 30; 20 INJECTION, SOLUTION INTRAVENOUS at 08:19

## 2024-05-16 RX ADMIN — PROPOFOL 40 MG: 10 INJECTION, EMULSION INTRAVENOUS at 09:56

## 2024-05-16 RX ADMIN — HYDROCORTISONE SODIUM SUCCINATE 100 MG: 100 INJECTION, POWDER, FOR SOLUTION INTRAMUSCULAR; INTRAVENOUS at 10:33

## 2024-05-16 RX ADMIN — PROPOFOL 150 MCG/KG/MIN: 10 INJECTION, EMULSION INTRAVENOUS at 09:54

## 2024-05-16 SDOH — HEALTH STABILITY: MENTAL HEALTH: CURRENT SMOKER: 0

## 2024-05-16 ASSESSMENT — PAIN SCALES - GENERAL
PAINLEVEL_OUTOF10: 0 - NO PAIN
PAINLEVEL_OUTOF10: 0 - NO PAIN

## 2024-05-16 ASSESSMENT — PAIN - FUNCTIONAL ASSESSMENT
PAIN_FUNCTIONAL_ASSESSMENT: 0-10

## 2024-05-16 NOTE — DISCHARGE INSTRUCTIONS

## 2024-05-16 NOTE — ANESTHESIA POSTPROCEDURE EVALUATION
Patient: Elena Michelle    Procedure Summary       Date: 05/16/24 Room / Location: [facility]    Anesthesia Start: 0950 Anesthesia Stop: 1044    Procedures:       EGD      COLONOSCOPY      CAPSULE ENDOSCOPY SMALL INTESTINES Diagnosis: Crohn's disease with complication, unspecified gastrointestinal tract location (Multi)    Scheduled Providers: Haresh Parnell MD; Peter Middleton MD Responsible Provider: Peter Middleton MD    Anesthesia Type: MAC ASA Status: 2            Anesthesia Type: MAC    Vitals Value Taken Time   /70 05/16/24 1045   Temp 36 °C (96.8 °F) 05/16/24 1045   Pulse 82 05/16/24 1045   Resp 16 05/16/24 1045   SpO2 98 05/16/24 1103       Anesthesia Post Evaluation    Patient location during evaluation: PACU  Patient participation: complete - patient participated  Level of consciousness: awake and alert  Pain management: adequate  Airway patency: patent  Cardiovascular status: acceptable  Respiratory status: acceptable  Hydration status: acceptable  Postoperative Nausea and Vomiting: none        No notable events documented.

## 2024-05-16 NOTE — ANESTHESIA PREPROCEDURE EVALUATION
Patient: Elena Michelle    Procedure Information       Date/Time: 05/16/24 0930    Scheduled providers: Haresh Parnell MD; Peter Middleton MD    Procedures:       EGD      COLONOSCOPY      CAPSULE ENDOSCOPY SMALL INTESTINES    Location: [facility]            Relevant Problems   Anesthesia (within normal limits)      Neuro   (+) Carpal tunnel syndrome, right upper limb   (+) Left carpal tunnel syndrome      GI   (+) Chronic diarrhea   (+) Crohn disease (Multi)      Liver   (+) Fatty liver      Musculoskeletal   (+) Carpal tunnel syndrome, right upper limb   (+) Left carpal tunnel syndrome   (+) Unilateral primary osteoarthritis of first carpometacarpal joint, right hand      HEENT   (+) Chronic ethmoidal sinusitis   (+) Chronic maxillary sinusitis      ID   (+) Acute URI   (+) Viral syndrome       Clinical information reviewed:   Tobacco  Allergies  Meds   Med Hx  Surg Hx   Fam Hx  Soc Hx        NPO Detail:  NPO/Void Status  Date of Last Liquid: 05/15/24  Time of Last Liquid: 2130  Date of Last Solid: 05/12/24         Physical Exam    Airway  Mallampati: II  TM distance: >3 FB  Neck ROM: full     Cardiovascular - normal exam  Rhythm: regular  Rate: normal     Dental - normal exam     Pulmonary - normal exam     Abdominal            Anesthesia Plan    History of general anesthesia?: yes  History of complications of general anesthesia?: no    ASA 2     MAC     The patient is not a current smoker.    intravenous induction   Anesthetic plan and risks discussed with patient.  Use of blood products discussed with patient who consented to blood products.    Plan discussed with CRNA.

## 2024-05-17 ENCOUNTER — APPOINTMENT (OUTPATIENT)
Dept: GASTROENTEROLOGY | Facility: HOSPITAL | Age: 54
End: 2024-05-17
Payer: COMMERCIAL

## 2024-05-18 NOTE — PROCEDURES
Capsule placed endoscopically    *First gastric image: 0.01.19  First duodenal image: 0.01.27  First cecal image: 1.28.47    *Mild duodenitis  Scattered erosions and erythema noted at 0h17 min and 0h22 min  Moderate mucosal erythema noted as of 2l66mmz (92%) until capsule reached the colon   Fair prep in some areas of the small and large intestinal limiting views, lesions among others could have been missed   Signal interruption noted in some areas of the colon     *Plan:   Started budesonide  Will plan for biologics if patient agreeable   Follow in GI office next available

## 2024-05-21 ENCOUNTER — TREATMENT (OUTPATIENT)
Dept: OCCUPATIONAL THERAPY | Facility: CLINIC | Age: 54
End: 2024-05-21
Payer: COMMERCIAL

## 2024-05-21 ENCOUNTER — APPOINTMENT (OUTPATIENT)
Dept: RADIOLOGY | Facility: HOSPITAL | Age: 54
End: 2024-05-21
Payer: COMMERCIAL

## 2024-05-21 ENCOUNTER — TELEPHONE (OUTPATIENT)
Dept: GASTROENTEROLOGY | Facility: CLINIC | Age: 54
End: 2024-05-21

## 2024-05-21 ENCOUNTER — OFFICE VISIT (OUTPATIENT)
Dept: ORTHOPEDIC SURGERY | Facility: CLINIC | Age: 54
End: 2024-05-21
Payer: COMMERCIAL

## 2024-05-21 ENCOUNTER — HOSPITAL ENCOUNTER (OUTPATIENT)
Dept: RADIOLOGY | Facility: CLINIC | Age: 54
Discharge: HOME | End: 2024-05-21
Payer: COMMERCIAL

## 2024-05-21 DIAGNOSIS — M18.9 OSTEOARTHRITIS OF CARPOMETACARPAL (CMC) JOINT OF THUMB, UNSPECIFIED LATERALITY, UNSPECIFIED OSTEOARTHRITIS TYPE: ICD-10-CM

## 2024-05-21 DIAGNOSIS — M25.60 JOINT STIFFNESS: Primary | ICD-10-CM

## 2024-05-21 DIAGNOSIS — G56.02 LEFT CARPAL TUNNEL SYNDROME: ICD-10-CM

## 2024-05-21 PROCEDURE — 73140 X-RAY EXAM OF FINGER(S): CPT | Mod: RIGHT SIDE | Performed by: ORTHOPAEDIC SURGERY

## 2024-05-21 PROCEDURE — 99024 POSTOP FOLLOW-UP VISIT: CPT | Performed by: ORTHOPAEDIC SURGERY

## 2024-05-21 PROCEDURE — 73140 X-RAY EXAM OF FINGER(S): CPT | Mod: RT

## 2024-05-21 NOTE — PROGRESS NOTES
5/21/2024    Chief Complaint   Patient presents with    Right Hand - Follow-up     Injury to thumb   Rt CTR  Rt thumb cmc arthroplasty  DOS: 3/27/24  Xrays today        History of Present Illness:  Patient Elena Michelle , 53 y.o. female, presents today, 5/21/2024, for evaluation of right thumb pain.  Jose Enrique was sent down by formal therapy today when she stated that last night she jammed her thumb into a door while reaching for something and felt a pop/snap.  She has some mild swelling and bruising today and they were concerned before progressing her to strengthening and endurance they wanted to assure that x-ray showed continued good alignment of CMC arthroplasty construct.       Review of Systems:   GENERAL: Negative  GI: Negative  MUSCULOSKELETAL: See HPI  SKIN: Negative  NEURO:  Negative     Physical Exam:  GENERAL:  Alert and oriented to person, place, and time.  No acute distress and breathing comfortably; pleasant and cooperative with the examination.  HEENT:  Head is normocephalic and atraumatic.  NECK:  Supple, no visible swelling.  CARDIOVASCULAR:  No palpable tachycardia.  LUNGS:  No audible wheezing or labored breathing.  ABDOMEN:  Nondistended.  Extremities: Evaluation of the right upper extremity finds the patient to have a palpable radial artery at the wrist with brisk capillary refill to all digits. The patient has intact sensorium to axillary, radial, median and ulnar nerves. There are no open wounds. There are no signs of infection. There is no evidence of lymphedema or lymphatic streaking. The patient has supple compartments of the right arm, forearm and hand.  Surgical incision is well-healed.  There is no much in the way of ecchymosis or swelling on clinical exam.     Imaging/Test Results:  3 views of the right thumb show good alignment throughout all 3 planes.  No acute fracture or dislocation.  No evidence of subsidence of the metacarpal.     Assessment:  Right thumb injury, 8 weeks out  from CMC arthroplasty.     Plan:  Alignment appears good on radiographs and overall patient is clinically doing well.  She is okay to continue with therapy as scheduled.  Follow-up with our office as previously scheduled for repeat clinical exam.  X-rays, 3 views of the right thumb upon return.  All questions answered at today's visit.    Cora Rachel PA-C

## 2024-05-22 ENCOUNTER — OFFICE VISIT (OUTPATIENT)
Dept: DERMATOLOGY | Facility: CLINIC | Age: 54
End: 2024-05-22
Payer: COMMERCIAL

## 2024-05-22 DIAGNOSIS — L81.4 LENTIGO: ICD-10-CM

## 2024-05-22 DIAGNOSIS — D23.9 DERMATOFIBROMA: ICD-10-CM

## 2024-05-22 DIAGNOSIS — D22.60 MELANOCYTIC NEVI OF UNSPECIFIED UPPER LIMB, INCLUDING SHOULDER: ICD-10-CM

## 2024-05-22 DIAGNOSIS — D22.5 MELANOCYTIC NEVI OF TRUNK: ICD-10-CM

## 2024-05-22 DIAGNOSIS — Z12.83 ENCOUNTER FOR SCREENING FOR MALIGNANT NEOPLASM OF SKIN: Primary | ICD-10-CM

## 2024-05-22 DIAGNOSIS — D18.01 HEMANGIOMA OF SKIN: ICD-10-CM

## 2024-05-22 DIAGNOSIS — L57.8 PHOTOAGING OF SKIN: ICD-10-CM

## 2024-05-22 PROCEDURE — 99203 OFFICE O/P NEW LOW 30 MIN: CPT | Performed by: DERMATOLOGY

## 2024-05-22 ASSESSMENT — DERMATOLOGY PATIENT ASSESSMENT
ARE YOU TRYING TO GET PREGNANT: NO
DO YOU USE A TANNING BED: NO
DO YOU HAVE ANY NEW OR CHANGING LESIONS: YES
DO YOU USE SUNSCREEN: OCCASIONALLY
DO YOU HAVE IRREGULAR MENSTRUAL CYCLES: NO
ARE YOU AN ORGAN TRANSPLANT RECIPIENT: NO

## 2024-05-22 ASSESSMENT — ITCH NUMERIC RATING SCALE: HOW SEVERE IS YOUR ITCHING?: 0

## 2024-05-22 ASSESSMENT — DERMATOLOGY QUALITY OF LIFE (QOL) ASSESSMENT
RATE HOW BOTHERED YOU ARE BY SYMPTOMS OF YOUR SKIN PROBLEM (EG, ITCHING, STINGING BURNING, HURTING OR SKIN IRRITATION): 0 - NEVER BOTHERED
ARE THERE EXCLUSIONS OR EXCEPTIONS FOR THE QUALITY OF LIFE ASSESSMENT: NO
WHAT SINGLE SKIN CONDITION LISTED BELOW IS THE PATIENT ANSWERING THE QUALITY-OF-LIFE ASSESSMENT QUESTIONS ABOUT: NONE OF THE ABOVE
RATE HOW BOTHERED YOU ARE BY EFFECTS OF YOUR SKIN PROBLEMS ON YOUR ACTIVITIES (EG, GOING OUT, ACCOMPLISHING WHAT YOU WANT, WORK ACTIVITIES OR YOUR RELATIONSHIPS WITH OTHERS): 0 - NEVER BOTHERED
RATE HOW EMOTIONALLY BOTHERED YOU ARE BY YOUR SKIN PROBLEM (FOR EXAMPLE, WORRY, EMBARRASSMENT, FRUSTRATION): 0 - NEVER BOTHERED

## 2024-05-22 ASSESSMENT — PATIENT GLOBAL ASSESSMENT (PGA): PATIENT GLOBAL ASSESSMENT: PATIENT GLOBAL ASSESSMENT:  1 - CLEAR

## 2024-05-22 NOTE — PROGRESS NOTES
Subjective     Elena Michelle is a 53 y.o. female who presents for the following: Skin Check (Pt here for FBSE. No hx of skin cancer. Pt has Crohns Disease and is going to be taking Stelara, it was recommended she have baseline exam. ).     Review of Systems:  No other skin or systemic complaints other than what is documented elsewhere in the note.    The following portions of the chart were reviewed this encounter and updated as appropriate:         Skin Cancer History  No skin cancer on file.      Specialty Problems          Dermatology Problems    Contusion of knee, right        Objective   Well appearing patient in no apparent distress; mood and affect are within normal limits.    A full examination was performed including scalp, head, eyes, ears, nose, lips, neck, chest, axillae, abdomen, back, buttocks, bilateral upper extremities, bilateral lower extremities, hands, feet, fingers, toes, fingernails, and toenails. All findings within normal limits unless otherwise noted below.    Assessment/Plan   1. Encounter for screening for malignant neoplasm of skin  No suspicious lesions noted on examination today    The risk of chronic, cumulative sun damage and risk of development of skin cancer was reviewed today.   The importance of sun protection was reviewed: including the use of a broad spectrum sunscreen that protects against both UVA/UVB rays, with ingredients such as Zinc oxide or titanium dioxide, wearing sun protective clothing and sun avoidance. We reviewed the warning signs of non-melanoma skin cancer and ABCDEs of melanoma  Please follow up should you notice any new or changing pre-existing skin lesion.    2. Photoaging of skin  Mottled pigmentation with telangiectasias and brown reticular macules in sun exposed areas of the body.    The risk of chronic, cumulative sun damage and risk of development of skin cancer was reviewed today.   The importance of sun protection was reviewed: including the use of a  broad spectrum sunscreen that protects against both UVA/UVB rays, with ingredients such as Zinc oxide or titanium dioxide, wearing sun protective clothing and sun avoidance. We reviewed the warning signs of non-melanoma skin cancer and ABCDEs of melanoma  Please follow up should you notice any new or changing pre-existing skin lesion.    3. Melanocytic nevi of unspecified upper limb, including shoulder (2)  Left Arm, Right Arm  Scattered, uniform and benign-appearing, regular brown melanocytic papules and macules.    Clinically benign appearing nevi, no treatment is necessary.  The importance of sun protection was reviewed: including the use of a broad spectrum sunscreen that protects against both UVA/UVB rays, with ingredients such as Zinc oxide or titanium dioxide, wearing sun protective clothing and sun avoidance.   ABCDEs of melanoma reviewed.  Please follow up should you notice any new or changing pre-existing skin lesion.    4. Melanocytic nevi of trunk  Torso - Posterior (Back)  Tan-brown symmetric macules and papules    Clinically benign appearing nevi, no treatment is necessary.  The importance of sun protection was reviewed: including the use of a broad spectrum sunscreen that protects against both UVA/UVB rays, with ingredients such as Zinc oxide or titanium dioxide, wearing sun protective clothing and sun avoidance.   ABCDEs of melanoma reviewed.  Please follow up should you notice any new or changing pre-existing skin lesion.    5. Hemangioma of skin  Cherry red papules    The benign nature of these skin lesions were reviewed, no treatment is necessary.   Please follow up for any new or pre-existing lesion that is changing in size, shape, color, becomes painful, tender, itches or bleed.    6. Dermatofibroma (3)  Left Lower Leg - Posterior, Right Lower Leg - Anterior, Right Upper Back  Firm pink papule with hyperpigmented halo, +dimple sign    Benign, scar tissue like growth that most frequently is due  to bug bite or ingrown hair. No treatment is necessary.    7. Lentigo  Scattered tan macules in sun-exposed areas.    These are benign skin lesions due to sun exposure. They will darken in response to sun exposure. They should be monitored for change in size, shape or color.  These lesions can be treated cosmetically with topical creams, liquid nitrogen and a variety of lasers.

## 2024-05-22 NOTE — LETTER
May 22, 2024     Haresh Parnell MD  [address]  [facility] [address]    Patient: Elena Michelle   YOB: 1970   Date of Visit: 5/22/2024       Dear Dr. Haresh Parnell MD:    Thank you for referring Elena Michelle to me for evaluation. Below are my notes for this consultation.  If you have questions, please do not hesitate to call me. I look forward to following your patient along with you.       Sincerely,     Eulalia Castrejon, DO      CC: No Recipients  ______________________________________________________________________________________    Subjective    Elena Michelle is a 53 y.o. female who presents for the following: Skin Check (Pt here for FBSE. No hx of skin cancer. Pt has Crohns Disease and is going to be taking Stelara, it was recommended she have baseline exam. ).     Review of Systems:  No other skin or systemic complaints other than what is documented elsewhere in the note.    The following portions of the chart were reviewed this encounter and updated as appropriate:         Skin Cancer History  No skin cancer on file.      Specialty Problems          Dermatology Problems    Contusion of knee, right        Objective  Well appearing patient in no apparent distress; mood and affect are within normal limits.    A full examination was performed including scalp, head, eyes, ears, nose, lips, neck, chest, axillae, abdomen, back, buttocks, bilateral upper extremities, bilateral lower extremities, hands, feet, fingers, toes, fingernails, and toenails. All findings within normal limits unless otherwise noted below.    Assessment/Plan  1. Encounter for screening for malignant neoplasm of skin  No suspicious lesions noted on examination today    The risk of chronic, cumulative sun damage and risk of development of skin cancer was reviewed today.   The importance of sun protection was reviewed: including the use of a broad spectrum sunscreen that protects against both UVA/UVB rays, with ingredients  such as Zinc oxide or titanium dioxide, wearing sun protective clothing and sun avoidance. We reviewed the warning signs of non-melanoma skin cancer and ABCDEs of melanoma  Please follow up should you notice any new or changing pre-existing skin lesion.    2. Photoaging of skin  Mottled pigmentation with telangiectasias and brown reticular macules in sun exposed areas of the body.    The risk of chronic, cumulative sun damage and risk of development of skin cancer was reviewed today.   The importance of sun protection was reviewed: including the use of a broad spectrum sunscreen that protects against both UVA/UVB rays, with ingredients such as Zinc oxide or titanium dioxide, wearing sun protective clothing and sun avoidance. We reviewed the warning signs of non-melanoma skin cancer and ABCDEs of melanoma  Please follow up should you notice any new or changing pre-existing skin lesion.    3. Melanocytic nevi of unspecified upper limb, including shoulder (2)  Left Arm, Right Arm  Scattered, uniform and benign-appearing, regular brown melanocytic papules and macules.    Clinically benign appearing nevi, no treatment is necessary.  The importance of sun protection was reviewed: including the use of a broad spectrum sunscreen that protects against both UVA/UVB rays, with ingredients such as Zinc oxide or titanium dioxide, wearing sun protective clothing and sun avoidance.   ABCDEs of melanoma reviewed.  Please follow up should you notice any new or changing pre-existing skin lesion.    4. Melanocytic nevi of trunk  Torso - Posterior (Back)  Tan-brown symmetric macules and papules    Clinically benign appearing nevi, no treatment is necessary.  The importance of sun protection was reviewed: including the use of a broad spectrum sunscreen that protects against both UVA/UVB rays, with ingredients such as Zinc oxide or titanium dioxide, wearing sun protective clothing and sun avoidance.   ABCDEs of melanoma reviewed.  Please  follow up should you notice any new or changing pre-existing skin lesion.    5. Hemangioma of skin  Cherry red papules    The benign nature of these skin lesions were reviewed, no treatment is necessary.   Please follow up for any new or pre-existing lesion that is changing in size, shape, color, becomes painful, tender, itches or bleed.    6. Dermatofibroma (3)  Left Lower Leg - Posterior, Right Lower Leg - Anterior, Right Upper Back  Firm pink papule with hyperpigmented halo, +dimple sign    Benign, scar tissue like growth that most frequently is due to bug bite or ingrown hair. No treatment is necessary.    7. Lentigo  Scattered tan macules in sun-exposed areas.    These are benign skin lesions due to sun exposure. They will darken in response to sun exposure. They should be monitored for change in size, shape or color.  These lesions can be treated cosmetically with topical creams, liquid nitrogen and a variety of lasers.

## 2024-05-24 LAB
LABORATORY COMMENT REPORT: NORMAL
PATH REPORT.FINAL DX SPEC: NORMAL
PATH REPORT.GROSS SPEC: NORMAL
PATH REPORT.RELEVANT HX SPEC: NORMAL
PATH REPORT.TOTAL CANCER: NORMAL

## 2024-05-28 DIAGNOSIS — K50.919 CROHN'S DISEASE WITH COMPLICATION, UNSPECIFIED GASTROINTESTINAL TRACT LOCATION (MULTI): Primary | ICD-10-CM

## 2024-05-28 RX ORDER — RISANKIZUMAB-RZAA 150 MG/ML
150 INJECTION SUBCUTANEOUS SEE ADMIN INSTRUCTIONS
Qty: 1 ML | Refills: 3 | Status: SHIPPED | OUTPATIENT
Start: 2024-05-28 | End: 2025-05-28

## 2024-05-28 RX ORDER — ALBUTEROL SULFATE 0.83 MG/ML
3 SOLUTION RESPIRATORY (INHALATION) AS NEEDED
OUTPATIENT
Start: 2024-05-28

## 2024-05-28 RX ORDER — DIPHENHYDRAMINE HCL 25 MG
25 CAPSULE ORAL ONCE
OUTPATIENT
Start: 2024-05-28

## 2024-05-28 RX ORDER — DIPHENHYDRAMINE HYDROCHLORIDE 50 MG/ML
50 INJECTION INTRAMUSCULAR; INTRAVENOUS AS NEEDED
OUTPATIENT
Start: 2024-05-28

## 2024-05-28 RX ORDER — FAMOTIDINE 10 MG/ML
20 INJECTION INTRAVENOUS ONCE AS NEEDED
OUTPATIENT
Start: 2024-05-28

## 2024-05-28 RX ORDER — EPINEPHRINE 0.3 MG/.3ML
0.3 INJECTION SUBCUTANEOUS EVERY 5 MIN PRN
OUTPATIENT
Start: 2024-05-28

## 2024-05-29 ENCOUNTER — TREATMENT (OUTPATIENT)
Dept: OCCUPATIONAL THERAPY | Facility: CLINIC | Age: 54
End: 2024-05-29
Payer: COMMERCIAL

## 2024-05-29 DIAGNOSIS — M25.60 JOINT STIFFNESS: Primary | ICD-10-CM

## 2024-05-29 PROCEDURE — 97110 THERAPEUTIC EXERCISES: CPT | Mod: GO

## 2024-05-29 NOTE — PROGRESS NOTES
Occupational Therapy Upper Extremity Progress Note    Date: 5/29/2024  Time in: 801  Time out: 846  Total time: 45 minutes      HC OT THERAPEUTIC EXERCISES  88410 (CPT®) 3 units    Elena Michelle   YOB: 1970   MRN: [id number]    Referring Physician: Cora Rachel PA-C   for: Orthosis and ROM, edema management  Diagnosis: Right CMC arthritis and right carpal tunnel syndrome  Date of  Surgery: 3/27/2024    (9 week post-surgery right CMC arthroplasty and right carpal tunnel release)  Precautions: NWB    Insurance  Visit Number: 6  Visits Allowed: 30  Authorization: not needed  Date Range: n/a    Subjective  Patient states soreness at night when sleeping with her wrist flexed.  Function: Patient is a  and is off of work. Patient reports her  helps with getting her dressed and bathing. Hobbies are cake decorating.  Pain and Location: 0/10  Chief Complaint: Patient reports no complaints.  Patient's goal for therapy: Patient states she would like to deal again for work.  Outcome Measure:  Initial evaluation Quick DASH 81.82    Objective  Observation: Patient to therapy with comfort cool on.  Skin/ Wound/Scar: Thumb scar no longer adhered and becoming flatter.  Edema: Minimal edema right thumb and  no longer dorsum of hand.  Sensation: Patient reports numbness middle finger to thumb, per patient. To be evaluated further.  Dexterity/ Coordination: Buttoning, tying, zipping are easier and doing her hair, per patient.    Upper Extremity ROM   Elbow flex/extension: full  Forearm supination/pronation: full   Wrist Extension/Flexion 70*/75*  Radial deviation/ulnar deviation 30*/40    Hand ROM  AROM   THUMB      [place] PIP SF    Palmar Abduction 70    Radial Abduction 70*     Finger (DPC)        Index Middle Ring Small    DPC* DPC DPC DPC                  Hand Strength                Gross grasp(dynamometer) (lbs)                             (2nd setting) Right   30*     Left 40*                Pinch  (lbs)                             Key  right  5* left 12*                            [place]   right 3   left 10*    ADLS/IADLS: Patient states she is independent with dressing.      Treatment Completed This Date:  Re-evaluation of thumb opposition and hand strength completed and discussed findings with patient.  She received fluidotherapy while completing ROM to improve thumb  ROM for opposition. She completed gentle  and pinch strengthening with  soft theraputty (yellow). Putty issued to upgrade home strengthening program. Patient instructed in and completed exercises correctly. Patient completed hand strengthening with yellow theratube and pinch strengthening with tweezer board. She also completed finger translation with grooved keyboard to improve thumb opposition. Fabricated new elastomer for improved mildly raised scar thumb scar. Purpose, wearing schedule, precautions and care  reviewed. Reviewed home program.      Assessment  Patient is a 54 y/o right hand dominate female diagnosed with right CMC arthritis and right carpal tunnel and is 9 weeks post-operative carpometacarpal arthroplasty and right carpal tunnel release. Patient reports follow through with home program.  She made gains in ROM for her thumb opposition and  and pinch strength. She tolerated all exercises including increased resistive theraputty and theratube exercises. She continues to benefit from receiving skilled occupational therapy.     Plan of Care  Goals to be achieved by 6  weeks    Patient's level of independence with ADLs/ IADLs will improve by at least 50% per the quick DASH by discharge.    Patient will demonstrate full wrist  ROM to complete ADLs/IADLs independently by discharge. PM    Patient will demonstrate full fist  of digits to improve participation in ADLs/IADLs by discharge.    Patient will report understanding of home program, demonstrate independence and verbalize precautions.    Patient will report follow through  with wearing of orthosis as instructed by therapist.Met    Patient will report pain free use of hand for completing ADLs/IADLS by discharge. Met    Patient's scar will be supple and demonstrate good healing that does not limit function by discharge. PM    Patient's gross grasp strength, per Dynamometer 2nd setting, will be within 5# of non-affected hand to complete ADLs/IADLs with increased independence by discharge. PM    Patient's pinch strengths will improve per pinch gauge to improve functional pinch strength for completing ADLs/IADLs by discharge. PM    Intervention    Therapeutic exercises, Therapeutic activity, manual therapy, orthosis, fluidotherapy, paraffin, taping, patient education, home program      Plan  Frequency 1-2/ week  Duration 6 weeks    Patient and therapist developed goals for therapy and patient verbalizing agreement to plan of care

## 2024-06-04 ENCOUNTER — TREATMENT (OUTPATIENT)
Dept: OCCUPATIONAL THERAPY | Facility: CLINIC | Age: 54
End: 2024-06-04
Payer: COMMERCIAL

## 2024-06-04 DIAGNOSIS — M25.60 JOINT STIFFNESS: Primary | ICD-10-CM

## 2024-06-04 PROCEDURE — 97110 THERAPEUTIC EXERCISES: CPT | Mod: GO

## 2024-06-04 NOTE — PROGRESS NOTES
Occupational Therapy Upper Extremity Progress Note    Date: 6/4/2024  Time in: 801  Time out: 844  Total time: 43 minutes      HC OT THERAPEUTIC EXERCISES  74545 (CPT®) 3 units    Elena Michelle   YOB: 1970   MRN: [id number]    Referring Physician: Cora Rachel PA-C   for: Orthosis and ROM, edema management  Diagnosis: Right CMC arthritis and right carpal tunnel syndrome  Date of  Surgery: 3/27/2024    (9 weeks and 6 days post-surgery right CMC arthroplasty and right carpal tunnel release)  Precautions: NWB    Insurance  Visit Number: 7  Visits Allowed: 30  Authorization: not needed  Date Range: n/a    Subjective  Patient states she is using it more. Thumb is numb and tingling.  Function: Patient is a  and is off of work. Patient reports her  helps with getting her dressed and bathing. Hobbies are cake decorating.  Pain and Location: 0/10  Chief Complaint: Patient reports no complaints.  Patient's goal for therapy: Patient states she would like to deal again for work.  Outcome Measure:  Initial evaluation Quick DASH 81.82    Objective  Observation: Patient to therapy with comfort cool on.  Skin/ Wound/Scar: Thumb scar no longer adhered and becoming flatter.  Edema: Minimal edema right thumb and  no longer dorsum of hand.  Sensation: Patient reports numbness middle finger to thumb, per patient. To be evaluated further.  Dexterity/ Coordination: Buttoning, tying, zipping are easier and doing her hair, per patient.    Upper Extremity ROM   Elbow flex/extension: full  Forearm supination/pronation: full   Wrist Extension/Flexion 70*/75*  Radial deviation/ulnar deviation 30*/40    Hand ROM  AROM   THUMB      Kapandji PIP SF    Palmar Abduction 70    Radial Abduction 70*     Finger (DPC)        Index Middle Ring Small    DPC* DPC DPC DPC                  Hand Strength                Gross grasp(dynamometer) (lbs)                             (2nd setting) Right   30*     Left 40*                 Pinch (lbs)                             Key  right  5* left 12*                            De Leon   right 3   left 10*    ADLS/IADLS: Patient states she is independent with dressing.      Treatment Completed This Date:    She received fluidotherapy while completing ROM to improve thumb ROM for opposition. She completed gentle  and pinch strengthening with mild resistive theraputty (orange). Putty issued to upgrade home strengthening program. Patient re-instructed in and completed exercises correctly. Patient completed hand strengthening with green theratube and pinch strengthening with red pinch clip. She also completed finger translation with grooved keyboard to improve thumb opposition. Reviewed home program.      Assessment  Patient is a 54 y/o right hand dominate female diagnosed with right CMC arthritis and right carpal tunnel and is 9 weeks and 6 weeks post-operative carpometacarpal arthroplasty and right carpal tunnel release. Patient reports follow through with home program and increased functional use with left upper extremity. She tolerated all exercises including increased resistive theraputty and theratube exercises. She continues to benefit from receiving skilled occupational therapy.     Plan of Care  Goals to be achieved by 6  weeks    Patient's level of independence with ADLs/ IADLs will improve by at least 50% per the quick DASH by discharge.    Patient will demonstrate full wrist  ROM to complete ADLs/IADLs independently by discharge. PM    Patient will demonstrate full fist  of digits to improve participation in ADLs/IADLs by discharge.    Patient will report understanding of home program, demonstrate independence and verbalize precautions.    Patient will report follow through with wearing of orthosis as instructed by therapist.Met    Patient will report pain free use of hand for completing ADLs/IADLS by discharge. Met    Patient's scar will be supple and demonstrate good healing that does  not limit function by discharge. PM    Patient's gross grasp strength, per Dynamometer 2nd setting, will be within 5# of non-affected hand to complete ADLs/IADLs with increased independence by discharge. PM    Patient's pinch strengths will improve per pinch gauge to improve functional pinch strength for completing ADLs/IADLs by discharge. PM    Intervention    Therapeutic exercises, Therapeutic activity, manual therapy, orthosis, fluidotherapy, paraffin, taping, patient education, home program      Plan  Frequency 1-2/ week  Duration 6 weeks    Patient and therapist developed goals for therapy and patient verbalizing agreement to plan of care

## 2024-06-10 ENCOUNTER — OFFICE VISIT (OUTPATIENT)
Dept: URGENT CARE | Facility: CLINIC | Age: 54
End: 2024-06-10
Payer: COMMERCIAL

## 2024-06-10 VITALS
TEMPERATURE: 97.3 F | HEIGHT: 61 IN | DIASTOLIC BLOOD PRESSURE: 69 MMHG | SYSTOLIC BLOOD PRESSURE: 106 MMHG | OXYGEN SATURATION: 96 % | HEART RATE: 85 BPM | WEIGHT: 160 LBS | BODY MASS INDEX: 30.21 KG/M2 | RESPIRATION RATE: 14 BRPM

## 2024-06-10 DIAGNOSIS — J02.9 SORE THROAT: ICD-10-CM

## 2024-06-10 DIAGNOSIS — H66.001 NON-RECURRENT ACUTE SUPPURATIVE OTITIS MEDIA OF RIGHT EAR WITHOUT SPONTANEOUS RUPTURE OF TYMPANIC MEMBRANE: Primary | ICD-10-CM

## 2024-06-10 LAB — POC RAPID STREP: NEGATIVE

## 2024-06-10 PROCEDURE — 87880 STREP A ASSAY W/OPTIC: CPT | Performed by: PHYSICIAN ASSISTANT

## 2024-06-10 PROCEDURE — 99213 OFFICE O/P EST LOW 20 MIN: CPT | Performed by: PHYSICIAN ASSISTANT

## 2024-06-10 RX ORDER — AMOXICILLIN 875 MG/1
875 TABLET, FILM COATED ORAL 2 TIMES DAILY
Qty: 14 TABLET | Refills: 0 | Status: SHIPPED | OUTPATIENT
Start: 2024-06-10 | End: 2024-06-17

## 2024-06-10 ASSESSMENT — PAIN SCALES - GENERAL: PAINLEVEL: 5

## 2024-06-10 NOTE — PROGRESS NOTES
"Subjective   Patient ID: Elena Michelle is a 53 y.o. female who presents for Sore Throat, Nasal Congestion, and Earache (Right ear pain x6 days.).  Patient is here with 6 days of upper respiratory symptoms and now developing right-sided ear pain.  She denies any continued fevers or chills but does note that she had a fever at the beginning of the illness.  She denies any nausea or vomiting or abdominal pain chest pain or shortness of breath.    Past Medical History:   Diagnosis Date    Aspirin sensitivity     BMI 31.0-31.9,adult     Chronic diarrhea     Chronic ethmoidal sinusitis     Chronic maxillary sinusitis     Crohn's colitis (Multi)     Crohn's colitis (Multi)     Decreased sense of smell     Fatty liver     Ileitis     Memory loss     Nasal polyp     Pain in left foot 07/01/2021    Left foot pain    Pain in right foot 07/01/2021    Right foot pain    Plantar fasciitis, bilateral     Post-nasal drip     Rhinorrhea          The remainder of the systems were reviewed and are negative unless noted above      Objective   /69   Pulse 85   Temp 36.3 °C (97.3 °F) (Temporal)   Resp 14   Ht 1.549 m (5' 1\")   Wt 72.6 kg (160 lb)   SpO2 96%   BMI 30.23 kg/m²   Physical Exam  Vitals reviewed.   Constitutional:       General: She is not in acute distress.     Appearance: Normal appearance. She is not toxic-appearing.   HENT:      Head: Normocephalic.      Right Ear: Ear canal normal. No tenderness. No mastoid tenderness. Tympanic membrane is injected, erythematous and bulging. Tympanic membrane is not perforated.      Left Ear: Tympanic membrane and ear canal normal. No tenderness. No mastoid tenderness. Tympanic membrane is not injected, perforated, erythematous or bulging.      Nose: Congestion and rhinorrhea present.      Mouth/Throat:      Mouth: Mucous membranes are moist.      Pharynx: Oropharynx is clear. Posterior oropharyngeal erythema present.   Eyes:      Conjunctiva/sclera: Conjunctivae normal. "   Cardiovascular:      Rate and Rhythm: Normal rate and regular rhythm.      Heart sounds: No murmur heard.  Pulmonary:      Effort: No respiratory distress.      Breath sounds: No stridor. No wheezing, rhonchi or rales.   Chest:      Chest wall: No tenderness.   Abdominal:      Tenderness: There is no abdominal tenderness. There is no guarding.   Musculoskeletal:         General: Normal range of motion.      Cervical back: No rigidity.   Lymphadenopathy:      Cervical: No cervical adenopathy.   Skin:     General: Skin is warm and dry.      Findings: No erythema.   Neurological:      General: No focal deficit present.      Mental Status: She is alert.         Assessment/Plan   Problem List Items Addressed This Visit       Sore throat    Relevant Orders    POCT rapid strep A manually resulted (Completed)    Non-recurrent acute suppurative otitis media of right ear without spontaneous rupture of tympanic membrane - Primary    Relevant Medications    amoxicillin (Amoxil) 875 mg tablet   Patient with right-sided otitis media.  Treating with amoxicillin twice daily for the next 7 days.  Otherwise no  Evidence to suggest otitis externa no tragal tenderness no mastoid tenderness

## 2024-06-11 ENCOUNTER — TREATMENT (OUTPATIENT)
Dept: OCCUPATIONAL THERAPY | Facility: CLINIC | Age: 54
End: 2024-06-11
Payer: COMMERCIAL

## 2024-06-11 DIAGNOSIS — M25.60 JOINT STIFFNESS: Primary | ICD-10-CM

## 2024-06-11 PROCEDURE — 97110 THERAPEUTIC EXERCISES: CPT | Mod: GO

## 2024-06-11 NOTE — PROGRESS NOTES
Occupational Therapy Upper Extremity Progress/Discharge  Note    Date: 6/11/2024  Time in: 801  Time out: 841  Total time:  40 minutes      HC OT THERAPEUTIC EXERCISES  59888 (CPT®)  units    Elena Michelle   YOB: 1970   MRN: [id number]    Referring Physician: Cora Rachel PA-C   for: Orthosis and ROM, edema management  Diagnosis: Right CMC arthritis and right carpal tunnel syndrome  Date of  Surgery: 3/27/2024    (10 weeks and 6 days post-surgery right CMC arthroplasty and right carpal tunnel release)  Precautions: NWB    Insurance  Visit number: 8  Visits Allowed: 30  Authorization: not needed  Date Range: n/a    Subjective  Patient states she has pain still with zipping and when she turns things. Otherwise, patient states she can do everything and is ready for discharge to home program.  Function: Patient is a  and is off of work. Patient reports her  helps with getting her dressed and bathing. Hobbies are cake decorating.  Pain and Location: 0/10  Chief Complaint: Patient reports no complaints.  Patient's goal for therapy: Patient states she would like to deal again for work.  Outcome Measure:  Initial evaluation Quick DASH 81.82, Discharge Quick DASH 40.90%    Objective  Observation: Patient to therapy without comfort cool on.  Skin/ Wound/Scar: Thumb scar no longer adhered and becoming flatter.  Edema: Minimal edema right thumb and  no longer dorsum of hand.  Sensation: Patient reports continued numbness thumb at surgical scar and dorsum of thumb, per patient. Intact for light touch.  Dexterity/ Coordination: Buttoning, tying, zipping are easier and doing her hair, per patient.    Upper Extremity ROM   Elbow flex/extension: full  Forearm supination/pronation: full   Wrist Extension/Flexion 70*/75* (ROM =to left)  Radial deviation/ulnar deviation 30*/40    Hand ROM  AROM   THUMB      Kapandji 9    Palmar Abduction 70    Radial Abduction 70*     Finger (DPC)        Index Middle Ring  Small    DPC* DPC DPC DPC            Hand Strength                Gross grasp(dynamometer) (lbs)                             (2nd setting) Right  35*   Left 40                Pinch (lbs)                             Key  right  8* left 12                            De Leon   right 4*   left 10*    ADLS/IADLS: Patient states she is independent with dressing.    Treatment Completed This Date:  Hand strength reevaluated and discussed findings with patient.  She received fluidotherapy while completing ROM to improve thumb ROM for opposition to DPC. She completed  and pinch strengthening with next  resistive theraputty (green). Putty issued to upgrade home strengthening program for continued gains expected. Patient re-instructed in and completed exercises correctly. Patient completed hand strengthening with green theratube, and gripper ( red, green) and pinch strengthening with green & blue pinch clip. Patient met all goals and is discharged to home program. Reviewed home program.      Assessment  Patient is a 54 y/o right hand dominate female diagnosed with right CMC arthritis and right carpal tunnel and is 10 weeks and 6 weeks post-operative carpometacarpal arthroplasty and right carpal tunnel release. She tolerated all exercises well. Patient met all goals and is discharged to home program with expected continued gains.     Plan of Care  Goals to be achieved by 6  weeks    Patient's level of independence with ADLs/ IADLs will improve by at least 50% per the quick DASH by discharge. Met    Patient will demonstrate full wrist  ROM to complete ADLs/IADLs independently by discharge. M    Patient will demonstrate full fist  of digits to improve participation in ADLs/IADLs by discharge. M    Patient will report understanding of home program, demonstrate independence and verbalize precautions.M    Patient will report follow through with wearing of orthosis as instructed by therapist.Met    Patient will report pain free use  of hand for completing ADLs/IADLS by discharge. Met    Patient's scar will be supple and demonstrate good healing that does not limit function by discharge. M    Patient's gross grasp strength, per Dynamometer 2nd setting, will be within 5# of non-affected hand to complete ADLs/IADLs with increased independence by discharge. M    Patient's pinch strengths will improve per pinch gauge to improve functional pinch strength for completing ADLs/IADLs by discharge. M    Intervention    Therapeutic exercises, Therapeutic activity, manual therapy, orthosis, fluidotherapy, paraffin, taping, patient education, home program      Plan: Patient discharge to home program  Frequency 1-2/ week  Duration 6 weeks    Patient and therapist developed goals for therapy and patient verbalizing agreement to plan of care

## 2024-06-12 ENCOUNTER — OFFICE VISIT (OUTPATIENT)
Dept: PRIMARY CARE | Facility: CLINIC | Age: 54
End: 2024-06-12
Payer: COMMERCIAL

## 2024-06-12 VITALS
RESPIRATION RATE: 18 BRPM | OXYGEN SATURATION: 95 % | SYSTOLIC BLOOD PRESSURE: 108 MMHG | TEMPERATURE: 97.5 F | HEART RATE: 86 BPM | DIASTOLIC BLOOD PRESSURE: 70 MMHG | WEIGHT: 166.8 LBS | BODY MASS INDEX: 31.52 KG/M2

## 2024-06-12 DIAGNOSIS — Z98.890 HISTORY OF CARPAL TUNNEL SURGERY OF RIGHT WRIST: ICD-10-CM

## 2024-06-12 DIAGNOSIS — G56.02 LEFT CARPAL TUNNEL SYNDROME: ICD-10-CM

## 2024-06-12 DIAGNOSIS — H66.90 ACUTE OTITIS MEDIA, UNSPECIFIED OTITIS MEDIA TYPE: Primary | ICD-10-CM

## 2024-06-12 DIAGNOSIS — K50.919 CROHN'S DISEASE WITH COMPLICATION, UNSPECIFIED GASTROINTESTINAL TRACT LOCATION (MULTI): ICD-10-CM

## 2024-06-12 DIAGNOSIS — Z12.31 ENCOUNTER FOR SCREENING MAMMOGRAM FOR MALIGNANT NEOPLASM OF BREAST: ICD-10-CM

## 2024-06-12 DIAGNOSIS — Z00.00 ANNUAL PHYSICAL EXAM: ICD-10-CM

## 2024-06-12 PROCEDURE — 90471 IMMUNIZATION ADMIN: CPT | Performed by: FAMILY MEDICINE

## 2024-06-12 PROCEDURE — 99214 OFFICE O/P EST MOD 30 MIN: CPT | Performed by: FAMILY MEDICINE

## 2024-06-12 PROCEDURE — 90750 HZV VACC RECOMBINANT IM: CPT | Performed by: FAMILY MEDICINE

## 2024-06-12 PROCEDURE — G2211 COMPLEX E/M VISIT ADD ON: HCPCS | Performed by: FAMILY MEDICINE

## 2024-06-12 ASSESSMENT — PAIN SCALES - GENERAL: PAINLEVEL: 0-NO PAIN

## 2024-06-12 ASSESSMENT — PATIENT HEALTH QUESTIONNAIRE - PHQ9
1. LITTLE INTEREST OR PLEASURE IN DOING THINGS: NOT AT ALL
SUM OF ALL RESPONSES TO PHQ9 QUESTIONS 1 AND 2: 0
2. FEELING DOWN, DEPRESSED OR HOPELESS: NOT AT ALL

## 2024-06-12 NOTE — PROGRESS NOTES
Subjective   Patient ID: Elena Michelle is a 53 y.o. female who presents for New Patient Visit and Establish Care (WENT OVER CARE GAPS).    HPI : 1. annual physical  Was at Urgent care yesterday : ear infection, started on Amoxicillin.    2. Carpal tunnel surgery right hand : 3/2024  Scheduled for follow up on 6/18, waiting for left hand surgery and to be cleared to return to work.  [employer] :  planner.  On Short term disability    3. Diagnosed with Crohn's : 2 to 3 years  Started on Enterocort for exacerbation last month, plan is  to wean off Enterocort and start Skiriz.    Review of Systems   All other systems reviewed and are negative.      Objective   /70 (BP Location: Left arm, Patient Position: Sitting, BP Cuff Size: Adult)   Pulse 86   Temp 36.4 °C (97.5 °F) (Temporal)   Resp 18   Wt 75.7 kg (166 lb 12.8 oz)   SpO2 95%   BMI 31.52 kg/m²     Physical Exam  Vitals and nursing note reviewed.   Cardiovascular:      Rate and Rhythm: Normal rate and regular rhythm.   Pulmonary:      Effort: Pulmonary effort is normal.      Breath sounds: Normal breath sounds.   Musculoskeletal:      Cervical back: Neck supple.   Lymphadenopathy:      Cervical: No cervical adenopathy.   Neurological:      Mental Status: She is alert.   Psychiatric:         Mood and Affect: Mood normal.         Behavior: Behavior normal.         Thought Content: Thought content normal.         Judgment: Judgment normal.         Assessment/Plan   Diagnoses and all orders for this visit:  Acute otitis media, unspecified otitis media type  Comments:  on day 2 of amoxicillin  Left carpal tunnel syndrome  Comments:  pending surgery  History of carpal tunnel surgery of right wrist  Crohn's disease with complication, unspecified gastrointestinal tract location (Multi)  Comments:  on Enterocort, plan to [place] off-Follows with GI  Annual physical exam  -     Lipid panel; Future  Encounter for screening mammogram for malignant neoplasm of  breast  -     BI mammo bilateral screening tomosynthesis; Future  Other orders  -     Follow Up In Primary Care - Established; Future  -     Zoster vaccine, recombinant, adult (SHINGRIX)

## 2024-06-13 ENCOUNTER — APPOINTMENT (OUTPATIENT)
Dept: GASTROENTEROLOGY | Facility: HOSPITAL | Age: 54
End: 2024-06-13
Payer: COMMERCIAL

## 2024-06-18 ENCOUNTER — HOSPITAL ENCOUNTER (OUTPATIENT)
Dept: RADIOLOGY | Facility: CLINIC | Age: 54
Discharge: HOME | End: 2024-06-18
Payer: COMMERCIAL

## 2024-06-18 ENCOUNTER — APPOINTMENT (OUTPATIENT)
Dept: OCCUPATIONAL THERAPY | Facility: CLINIC | Age: 54
End: 2024-06-18
Payer: COMMERCIAL

## 2024-06-18 ENCOUNTER — OFFICE VISIT (OUTPATIENT)
Dept: ORTHOPEDIC SURGERY | Facility: CLINIC | Age: 54
End: 2024-06-18
Payer: COMMERCIAL

## 2024-06-18 VITALS — BODY MASS INDEX: 30.21 KG/M2 | WEIGHT: 160 LBS | HEIGHT: 61 IN

## 2024-06-18 DIAGNOSIS — M18.9 OSTEOARTHRITIS OF CARPOMETACARPAL (CMC) JOINT OF THUMB, UNSPECIFIED LATERALITY, UNSPECIFIED OSTEOARTHRITIS TYPE: ICD-10-CM

## 2024-06-18 DIAGNOSIS — M19.049 CMC ARTHRITIS: Primary | ICD-10-CM

## 2024-06-18 DIAGNOSIS — G56.03 BILATERAL CARPAL TUNNEL SYNDROME: ICD-10-CM

## 2024-06-18 PROCEDURE — 73140 X-RAY EXAM OF FINGER(S): CPT | Mod: RIGHT SIDE | Performed by: ORTHOPAEDIC SURGERY

## 2024-06-18 PROCEDURE — 99024 POSTOP FOLLOW-UP VISIT: CPT | Performed by: ORTHOPAEDIC SURGERY

## 2024-06-18 PROCEDURE — 73140 X-RAY EXAM OF FINGER(S): CPT | Mod: RT

## 2024-06-18 PROCEDURE — 99214 OFFICE O/P EST MOD 30 MIN: CPT | Performed by: ORTHOPAEDIC SURGERY

## 2024-06-18 ASSESSMENT — PAIN - FUNCTIONAL ASSESSMENT: PAIN_FUNCTIONAL_ASSESSMENT: NO/DENIES PAIN

## 2024-06-18 NOTE — PROGRESS NOTES
"    6/18/2024    Chief Complaint   Patient presents with    Right Hand - Follow-up     Injury to thumb   Rt CTR  Rt thumb cmc arthroplasty  DOS: 3/27/24  Xrays today        History of Present Illness:  Patient Elena Michelle , 53 y.o. female, presents today, 6/18/2024, for evaluation of bilateral hand and thumb pain and numbness.  Jose Enrique is 10 weeks postop from right thumb CMC arthroplasty and right carpal tunnel release.  She still has some numbness to the thumb some reactive swelling that is relieved by rest and aggravated by use, but she plans to be discharged from therapy today to work on home program for endurance and strengthening.  Functionally she feels she is doing well she just knows she needs to build on regaining her strength.  Overall she states that the right side is \"doing great\".  However, symptoms on the left of pain at the base of the thumb and numbness and tingling in median nerve distribution have been steadily worsening.  She got relief from injections in the past, these have now worn off.  She states that she is ready to schedule surgery on this left side and is looking to get it done within the next month or 2.  She has history of Crohn's disease has upcoming plans to transition to [facility], but her GI doctor wants her to hold off until after her surgery on the left side.       Review of Systems:   GENERAL: Negative  GI: Negative  MUSCULOSKELETAL: See HPI  SKIN: Negative  NEURO:  Negative     Physical Exam:  GENERAL:  Alert and oriented to person, place, and time.  No acute distress and breathing comfortably; pleasant and cooperative with the examination.  HEENT:  Head is normocephalic and atraumatic.  NECK:  Supple, no visible swelling.  CARDIOVASCULAR:  No palpable tachycardia.  LUNGS:  No audible wheezing or labored breathing.  ABDOMEN:  Nondistended.  Extremities: Evaluation of bilateral upper extremities finds the patient to have a palpable radial artery at the wrist with brisk capillary " refill to all digits. The patient has intact sensorium to axillary, radial, median and ulnar nerves. There are no open wounds. There are no signs of infection. There is no evidence of lymphedema or lymphatic streaking. The patient has supple compartments of the bilateral arms, forearms and hands.  Surgical incisions on the right are well-healed.  She has functional arc of motion through flexion extension pronosupination of the wrist.  She can flex the thumb comfortably to the level of the A1 pulley of the right small finger.  On the left she has tenderness to palpation over the left thumb CMC articulation with a positive basilar grind test.  Positive Tinel's over the left median nerve at the wrist.     Imaging/Test Results:  3 views of the right thumb taken in the office today show no acute fracture or dislocation.  Surgical resection trapezium is noted.  There is no evidence of failure of subsidence of CMC arthroplasty construct with good alignment of the metacarpal throughout all views.     Assessment:  #1: Right thumb CMC arthroplasty with concomitant right carpal tunnel release, 10 weeks postop doing well.  #2: Right thumb CMC osteoarthritis and left carpal tunnel syndrome recalcitrant to nonoperative treatment strategies.     Plan:  Treatment options were discussed including both operative and non-operative treatment strategies.  Patient elects to proceed forth with left thumb CMC arthroplasty with possible tendon transfer with Arthrex mini tight rope on hold with concomitant left carpal tunnel release.  Risks, benefits, and alternatives to surgery were discussed including, but not limited to infection risk, persistent or incomplete relief of pain, swelling, or stiffness, and post-operative pain and discomfort.  The patient verbalized agreement and understanding of the plan for care.  All questions answered at today's visit.  Plan for follow-up 10-14 days post-op.    On the right patient can continue with  activities and weight-bear to tolerance at this stage.  Continue to work on gentle endurance and strengthening through home program.  We will evaluate any ongoing symptoms at her postop visit for the left side.    In a face to face encounter, I performed a history and physical examination, discussed pertinent diagnostic studies if indicated, and discussed diagnosis and management strategies with both the patient and the mid-level provider. I reviewed the mid-level's note and agree with the documented findings and plan of care.  Patient presents today for ongoing evaluation of bilateral upper extremity she is status post right carpal tunnel release and right thumb CMC arthroplasty.  That continues to do well.  She is highly symptomatic with left thumb CMC arthritis and left carpal tunnel syndrome.  Positive Tinel's over course of median nerve to left wrist.  Focal tenderness to left thumb CMC articulation.  Treatment options were discussed.  On the right patient elects for continuance of rehab protocols to recover from right thumb CMC arthroplasty.  On the left she elects to forego any additional nonoperative measures in favor of left thumb CMC arthroplasty possibly using mini tight rope construct with concomitant left carpal tunnel release.

## 2024-06-25 ENCOUNTER — APPOINTMENT (OUTPATIENT)
Dept: OCCUPATIONAL THERAPY | Facility: CLINIC | Age: 54
End: 2024-06-25
Payer: COMMERCIAL

## 2024-07-01 ENCOUNTER — TELEPHONE (OUTPATIENT)
Dept: ORTHOPEDIC SURGERY | Facility: CLINIC | Age: 54
End: 2024-07-01
Payer: COMMERCIAL

## 2024-07-01 NOTE — TELEPHONE ENCOUNTER
Called patient to schedule recommended surgery with Dr. Perez, call went to voicemail at which time a message was left. A CallmyName message was also sent at this time.

## 2024-07-02 ENCOUNTER — APPOINTMENT (OUTPATIENT)
Dept: OCCUPATIONAL THERAPY | Facility: CLINIC | Age: 54
End: 2024-07-02
Payer: COMMERCIAL

## 2024-07-02 ENCOUNTER — TELEPHONE (OUTPATIENT)
Dept: ORTHOPEDIC SURGERY | Facility: CLINIC | Age: 54
End: 2024-07-02
Payer: COMMERCIAL

## 2024-07-02 NOTE — TELEPHONE ENCOUNTER
Keenan from [facility] called and stated that she is requesting a extension on her short term disability and he needs some information to do so . He needs to know : the reason/treatment she will have during this time and when she will be expected to RTW .   If you have questions please give him a call at [phone] ext [phone]  Claim number is - [id number]

## 2024-07-02 NOTE — TELEPHONE ENCOUNTER
Patient is scheduled to have Lt CTR, Lt thumb cmc arthroplasty on 7/24/24.  Will need 3 months post sx to heal and get ROM back in this hand.  Expected RTW 10/28/24

## 2024-07-02 NOTE — LETTER
July 3, 2024     Elena Michelle  [address] [place] [address]    Patient: Elena Michelle   YOB: 1970   Date of Visit: 7/2/2024       Dear Talib Hussein    Elena Michelle has been under my care for bilateral carpal tunnel syndrome and bilateral thumb cmc arthritis.  Elena had her right carpal tunnel released and right thumb cmc arthroplasty on 03/27/2024.  She was to reamain out of work for three months post op for healing and to attend occupational therapy to achieve range of motion and relieve pain.  Elena is scheduled to have the same surgery, carpal tunnel release and thumb cmc arthroplasty on 07/24/2024 with myself.  She will need to remain out of work for 3 months post surgery to allow for healing and to allow her to attend occupational therapy to achieve range of motion and to help relieve the pain.  Her estimated return to work is 10/28/2024.    If you have any further questions, please call my office at [phone] or fax any related paperwork or forms to [phone].             Sincerely,     Sen Perez, DO      ______________________________________________________________________________________

## 2024-07-03 NOTE — TELEPHONE ENCOUNTER
Attempted to call phone number in previous note, it was the wrong number.  I looked at the pw and the correct phone number [phone].  The office  is closed.  Will try to call again later.  Will also fax over letter and last OV note to Keenan.

## 2024-07-09 ENCOUNTER — APPOINTMENT (OUTPATIENT)
Dept: OCCUPATIONAL THERAPY | Facility: CLINIC | Age: 54
End: 2024-07-09
Payer: COMMERCIAL

## 2024-07-09 PROBLEM — M18.12 UNILATERAL PRIMARY OSTEOARTHRITIS OF FIRST CARPOMETACARPAL JOINT, LEFT HAND: Status: ACTIVE | Noted: 2024-07-09

## 2024-07-09 PROBLEM — G56.02 CARPAL TUNNEL SYNDROME, LEFT UPPER LIMB: Status: ACTIVE | Noted: 2024-07-09

## 2024-07-21 NOTE — DISCHARGE INSTRUCTIONS
Medication given may have significant effects after discharge. Therefore on the day of surgery:  1) You must be accompanied by a responsible adult upon discharge and for 24 hours after surgery.  Do not drive a motor vehicle, operate machinery, power tools or appliance, drink alcoholic beverages, or make critical decisions for 24 hours.  2) Be aware of dizziness, which may cause a fall. Change positions slowly.  3) Eating: you may resume your regular diet but it is better to increase intake slowly with mild foods and working up to your regular diet. No greasy, fried or spicy foods today.  4) Nausea/Vomiting: Nausea and vomiting may occur as you become more active or begin to increase food intake. If this should happen, decrease activity and return to liquids. If the problem persists, call your surgeon  5) Pain: Your surgeon may have given you a prescription for pain medication. Take pain medication with food as prescribed. Pain medication may cause constipation, so drink plenty of fluids. If your pain medication does not provide adequate relief, call your surgeon  6) Urinating: Notify your surgeon if you have not urinated within 12 hours after discharge  7) Ice: Apply ice to operative site for 20 min 5-6 times a day or use Polar care as instructed  8) Dressing:     [x]  Leave dressing in place. Keep dressing/ incision clean and dry.      9) Activity:    Shoulder/ Elbow/Hand:   [x]  Elevate extremity    [x]  Sling   [] At all times (except for exercises and showering)  [x] As needed only for comfort   [x] Begin daily motion exercises out of sling as instructed   [x]  Bend and flex fingers/wrist/elbow frequently   [x] Non-weight bearing/No lifting/gripping/squeezing to the surgical limb   [] No lifting greater than 1 lb until follow-up visit      10) Begin physical therapy if advised by your physician:   [] Before returning to see you doctor    [] Will discuss possible need at follow-up visit   [x] Will be paired  with your follow-up visit in [place]    11) Call your doctor at [phone] for an appointment (or follow up as scheduled)    Contact [facility] office if  Increased redness, swelling, drainage of any kind, and/or pain to surgery site.  As well as new onset fevers and or chills.  These could signify an infection.  Calf or thigh tenderness to touch as well as increased swelling or redness.  This could signify a clot formation.  Numbness or tingling to an area around the incision site or below the incision site (toes). Or if the operative extremity becomes cold, blue.  Any rash appears, increased  or new onset nausea/vomiting occur.  This may indicate a reaction to a medication.  Temp is 38.5 C (101F)  12) If you have any concerns or questions, please call [facility] surgeon on call. The 24- hour phone is [phone]  13) If you are unable to contact your surgeon, in an emergency situation, go to the nearest hospital    Nerve Blocks    Why is this procedure done?  Nerve blocks can help to manage pain. By giving you a drug into an exact group of nerves, your doctor may be able to block pain to a specific part of your body. Some nerve blocks are used after surgery, especially if you have had an abdominal surgery. Nerve blocks are used before surgery to lessen the need for opioid drugs during and after surgery.  There are a few kinds of nerve blocks. Some nerve blocks are used to:  Treat pain. These may have a pain drug and a drug to help with swelling.  Find where your pain is coming from. This kind of nerve block will have a pain drug that lasts for only a certain amount of time.  See if another kind of treatment like surgery will help your pain.  Prevent pain during or after a procedure.  Help you avoid surgery.  Give relief of pain during and after surgery.  Lessen the use of opioid drugs needed for pain after surgery.  Block the pain in an area of the body during surgery as  anesthesia.  What will the results be?  The nerve block may help to treat or ease your pain. The area may be numb. You may have some pain relief right away. You may be able to use fewer pain medicines after surgery. It also may be easier for you to move around after surgery. Some nerve blocks go away within a few hours. Others give you pain relief for a day or so to a few months or longer. Some nerve blocks can take a few days to work fully.  What happens before the procedure?  Your doctor will ask you about your health history. Talk to the doctor about:  All the drugs you are taking. Be sure to include all prescription, over the counter, and herbal supplements. Tell the doctor if you have any drug allergy. Bring a list of drugs you take with you.  Any bleeding problems. Be sure to tell your doctor if you are taking any drugs that may cause bleeding. Some of these are warfarin, rivaroxaban, apixaban, ticagrelor, clopidogrel, ibuprofen, naproxen, or aspirin. Certain vitamins and herbs, such as garlic and fish oil, may also add to the risk for bleeding. You may need to stop these drugs as well. Talk to your doctor about them.  What happens during the procedure?  Sometimes, the doctor will give you a special drug to make you sleepy for the nerve block. Other times, you are completely awake. You may also have a nerve block as a part of your surgery.  The doctor will position you in a way to give them easy access to where you will be having the nerve block.  The doctor will clean the area and give you a local numbing drug. The doctor will use a long thin needle to give you the nerve block. Often, the doctor will use a special x-ray, ultrasound, or CT scan to make sure the needle is in the right place. The doctor will inject the drug close to the nerve that is causing your pain. Sometimes they inject the drug in an area that will block pain from a few nerves.  The doctor will take out the needle and place a clean bandage  on your skin.  Sometimes, the doctor may leave a catheter in place to deliver medicine over 1 to 2 days. You may have to return to your doctor's office to have the catheter removed.  The procedure takes 15 to 30 minutes.  What happens after the procedure?  If you are not having surgery, you will be able to go home the same day. You may be asked to rest for 15 to 30 minutes. If the doctor gives you a special drug to make you sleepy for the procedure, you will need someone to drive you home.  What care is needed at home?  You will be allowed to shower or take a bath later that same day unless you have a catheter still in place.  You may need other medicines to help with pain as your nerve block wears off.  What follow-up care is needed?  As your body absorbs the drugs, your pain may come back. Talk to your doctor about if you need another nerve block and how often you can have a nerve block.  What problems could happen?  Infection  Bleeding or bruising  Pain at the injection site  Raised blood sugar  Rash  Itching  Numbness  Nerve injury  Allergic reaction  Puncture or laceration of an organ like the liver, spleen. or bowel  Numbing medicine injected into a blood vessel  Where can I learn more?  American Society of Regional Anesthesia  https://www.dia.com/patient-information/regional-anesthesia  NHS  https://www.nhs.uk/conditions/epidural/  NHS  https://www.nhs.uk/conditions/local-anaesthesia/  [employer] Society of [place]  http://www.radiologyinfo.org/en/info.cfm?pg=nerveblock  Last Reviewed Date  2020-04-22

## 2024-07-29 ENCOUNTER — LAB (OUTPATIENT)
Dept: LAB | Facility: LAB | Age: 54
End: 2024-07-29
Payer: COMMERCIAL

## 2024-07-29 DIAGNOSIS — Z00.00 ANNUAL PHYSICAL EXAM: ICD-10-CM

## 2024-07-29 DIAGNOSIS — K50.919 CROHN'S DISEASE WITH COMPLICATION, UNSPECIFIED GASTROINTESTINAL TRACT LOCATION (MULTI): ICD-10-CM

## 2024-07-29 DIAGNOSIS — Z01.818 PRE-OP EXAM: ICD-10-CM

## 2024-07-29 LAB
ALBUMIN SERPL BCP-MCNC: 4.2 G/DL (ref 3.4–5)
ALP SERPL-CCNC: 102 U/L (ref 33–110)
ALT SERPL W P-5'-P-CCNC: 16 U/L (ref 7–45)
ANION GAP SERPL CALC-SCNC: 12 MMOL/L (ref 10–20)
AST SERPL W P-5'-P-CCNC: 15 U/L (ref 9–39)
BASOPHILS # BLD AUTO: 0.08 X10*3/UL (ref 0–0.1)
BASOPHILS NFR BLD AUTO: 0.8 %
BILIRUB SERPL-MCNC: 0.6 MG/DL (ref 0–1.2)
BUN SERPL-MCNC: 14 MG/DL (ref 6–23)
CALCIUM SERPL-MCNC: 9.7 MG/DL (ref 8.6–10.3)
CHLORIDE SERPL-SCNC: 105 MMOL/L (ref 98–107)
CHOLEST SERPL-MCNC: 250 MG/DL (ref 0–199)
CHOLESTEROL/HDL RATIO: 4.4
CO2 SERPL-SCNC: 26 MMOL/L (ref 21–32)
CREAT SERPL-MCNC: 0.73 MG/DL (ref 0.5–1.05)
EGFRCR SERPLBLD CKD-EPI 2021: >90 ML/MIN/1.73M*2
EOSINOPHIL # BLD AUTO: 0.4 X10*3/UL (ref 0–0.7)
EOSINOPHIL NFR BLD AUTO: 3.9 %
ERYTHROCYTE [DISTWIDTH] IN BLOOD BY AUTOMATED COUNT: 13.3 % (ref 11.5–14.5)
GLUCOSE SERPL-MCNC: 105 MG/DL (ref 74–99)
HAV IGM SER QL: NONREACTIVE
HBV CORE IGM SER QL: NONREACTIVE
HBV SURFACE AG SERPL QL IA: NONREACTIVE
HCT VFR BLD AUTO: 41 % (ref 36–46)
HCV AB SER QL: NONREACTIVE
HDLC SERPL-MCNC: 57.2 MG/DL
HGB BLD-MCNC: 13.4 G/DL (ref 12–16)
IMM GRANULOCYTES # BLD AUTO: 0.02 X10*3/UL (ref 0–0.7)
IMM GRANULOCYTES NFR BLD AUTO: 0.2 % (ref 0–0.9)
LDLC SERPL CALC-MCNC: 171 MG/DL
LYMPHOCYTES # BLD AUTO: 4.77 X10*3/UL (ref 1.2–4.8)
LYMPHOCYTES NFR BLD AUTO: 46.8 %
MCH RBC QN AUTO: 30 PG (ref 26–34)
MCHC RBC AUTO-ENTMCNC: 32.7 G/DL (ref 32–36)
MCV RBC AUTO: 92 FL (ref 80–100)
MONOCYTES # BLD AUTO: 0.6 X10*3/UL (ref 0.1–1)
MONOCYTES NFR BLD AUTO: 5.9 %
NEUTROPHILS # BLD AUTO: 4.33 X10*3/UL (ref 1.2–7.7)
NEUTROPHILS NFR BLD AUTO: 42.4 %
NON HDL CHOLESTEROL: 193 MG/DL (ref 0–149)
NRBC BLD-RTO: 0 /100 WBCS (ref 0–0)
PLATELET # BLD AUTO: 316 X10*3/UL (ref 150–450)
POTASSIUM SERPL-SCNC: 4.3 MMOL/L (ref 3.5–5.3)
PROT SERPL-MCNC: 6.9 G/DL (ref 6.4–8.2)
RBC # BLD AUTO: 4.47 X10*6/UL (ref 4–5.2)
SODIUM SERPL-SCNC: 139 MMOL/L (ref 136–145)
TRIGL SERPL-MCNC: 108 MG/DL (ref 0–149)
VLDL: 22 MG/DL (ref 0–40)
WBC # BLD AUTO: 10.2 X10*3/UL (ref 4.4–11.3)

## 2024-07-29 PROCEDURE — 80061 LIPID PANEL: CPT

## 2024-07-29 PROCEDURE — 80074 ACUTE HEPATITIS PANEL: CPT

## 2024-07-29 PROCEDURE — 86481 TB AG RESPONSE T-CELL SUSP: CPT

## 2024-07-29 PROCEDURE — 85025 COMPLETE CBC W/AUTO DIFF WBC: CPT

## 2024-07-29 PROCEDURE — 36415 COLL VENOUS BLD VENIPUNCTURE: CPT

## 2024-07-29 PROCEDURE — 80053 COMPREHEN METABOLIC PANEL: CPT

## 2024-08-01 ENCOUNTER — ANESTHESIA EVENT (OUTPATIENT)
Dept: OPERATING ROOM | Facility: HOSPITAL | Age: 54
End: 2024-08-01
Payer: COMMERCIAL

## 2024-08-01 RX ORDER — TRAMADOL HYDROCHLORIDE 50 MG/1
50 TABLET ORAL EVERY 8 HOURS
Qty: 21 TABLET | Refills: 0 | Status: SHIPPED | OUTPATIENT
Start: 2024-08-01 | End: 2024-08-08

## 2024-08-01 RX ORDER — SODIUM CHLORIDE, SODIUM LACTATE, POTASSIUM CHLORIDE, CALCIUM CHLORIDE 600; 310; 30; 20 MG/100ML; MG/100ML; MG/100ML; MG/100ML
100 INJECTION, SOLUTION INTRAVENOUS CONTINUOUS
Status: CANCELLED | OUTPATIENT
Start: 2024-08-01

## 2024-08-02 ENCOUNTER — ANESTHESIA (OUTPATIENT)
Dept: OPERATING ROOM | Facility: HOSPITAL | Age: 54
End: 2024-08-02
Payer: COMMERCIAL

## 2024-08-02 ENCOUNTER — HOSPITAL ENCOUNTER (OUTPATIENT)
Facility: HOSPITAL | Age: 54
Setting detail: OUTPATIENT SURGERY
Discharge: HOME | End: 2024-08-02
Attending: ORTHOPAEDIC SURGERY | Admitting: ORTHOPAEDIC SURGERY
Payer: COMMERCIAL

## 2024-08-02 ENCOUNTER — APPOINTMENT (OUTPATIENT)
Dept: RADIOLOGY | Facility: HOSPITAL | Age: 54
End: 2024-08-02
Payer: COMMERCIAL

## 2024-08-02 VITALS
HEIGHT: 61 IN | TEMPERATURE: 97 F | SYSTOLIC BLOOD PRESSURE: 106 MMHG | RESPIRATION RATE: 16 BRPM | DIASTOLIC BLOOD PRESSURE: 60 MMHG | WEIGHT: 164.9 LBS | BODY MASS INDEX: 31.13 KG/M2 | HEART RATE: 81 BPM | OXYGEN SATURATION: 95 %

## 2024-08-02 DIAGNOSIS — G89.18 POST-OP PAIN: Primary | ICD-10-CM

## 2024-08-02 DIAGNOSIS — Z01.818 PRE-OP EXAM: ICD-10-CM

## 2024-08-02 DIAGNOSIS — G56.02 CARPAL TUNNEL SYNDROME, LEFT UPPER LIMB: ICD-10-CM

## 2024-08-02 DIAGNOSIS — M18.12 UNILATERAL PRIMARY OSTEOARTHRITIS OF FIRST CARPOMETACARPAL JOINT, LEFT HAND: ICD-10-CM

## 2024-08-02 PROCEDURE — 7100000009 HC PHASE TWO TIME - INITIAL BASE CHARGE: Performed by: ORTHOPAEDIC SURGERY

## 2024-08-02 PROCEDURE — 2500000004 HC RX 250 GENERAL PHARMACY W/ HCPCS (ALT 636 FOR OP/ED): Performed by: PHYSICIAN ASSISTANT

## 2024-08-02 PROCEDURE — 3600000008 HC OR TIME - EACH INCREMENTAL 1 MINUTE - PROCEDURE LEVEL THREE: Performed by: ORTHOPAEDIC SURGERY

## 2024-08-02 PROCEDURE — 25310 TRANSPLANT FOREARM TENDON: CPT | Performed by: ORTHOPAEDIC SURGERY

## 2024-08-02 PROCEDURE — 7100000001 HC RECOVERY ROOM TIME - INITIAL BASE CHARGE: Performed by: ORTHOPAEDIC SURGERY

## 2024-08-02 PROCEDURE — 3600000003 HC OR TIME - INITIAL BASE CHARGE - PROCEDURE LEVEL THREE: Performed by: ORTHOPAEDIC SURGERY

## 2024-08-02 PROCEDURE — 25310 TRANSPLANT FOREARM TENDON: CPT | Performed by: PHYSICIAN ASSISTANT

## 2024-08-02 PROCEDURE — 3700000002 HC GENERAL ANESTHESIA TIME - EACH INCREMENTAL 1 MINUTE: Performed by: ORTHOPAEDIC SURGERY

## 2024-08-02 PROCEDURE — 3700000001 HC GENERAL ANESTHESIA TIME - INITIAL BASE CHARGE: Performed by: ORTHOPAEDIC SURGERY

## 2024-08-02 PROCEDURE — 2500000004 HC RX 250 GENERAL PHARMACY W/ HCPCS (ALT 636 FOR OP/ED): Performed by: REGISTERED NURSE

## 2024-08-02 PROCEDURE — 2500000004 HC RX 250 GENERAL PHARMACY W/ HCPCS (ALT 636 FOR OP/ED): Performed by: ANESTHESIOLOGY

## 2024-08-02 PROCEDURE — 2500000005 HC RX 250 GENERAL PHARMACY W/O HCPCS: Performed by: REGISTERED NURSE

## 2024-08-02 PROCEDURE — 2720000007 HC OR 272 NO HCPCS: Performed by: ORTHOPAEDIC SURGERY

## 2024-08-02 PROCEDURE — 25447 ARTHRP NTRCRP/CRP/MTCR NTRPS: CPT | Performed by: ORTHOPAEDIC SURGERY

## 2024-08-02 PROCEDURE — 7100000002 HC RECOVERY ROOM TIME - EACH INCREMENTAL 1 MINUTE: Performed by: ORTHOPAEDIC SURGERY

## 2024-08-02 PROCEDURE — 25447 ARTHRP NTRCRP/CRP/MTCR NTRPS: CPT | Performed by: PHYSICIAN ASSISTANT

## 2024-08-02 PROCEDURE — 64721 CARPAL TUNNEL SURGERY: CPT | Performed by: ORTHOPAEDIC SURGERY

## 2024-08-02 PROCEDURE — 2500000001 HC RX 250 WO HCPCS SELF ADMINISTERED DRUGS (ALT 637 FOR MEDICARE OP): Performed by: ANESTHESIOLOGY

## 2024-08-02 PROCEDURE — 7100000010 HC PHASE TWO TIME - EACH INCREMENTAL 1 MINUTE: Performed by: ORTHOPAEDIC SURGERY

## 2024-08-02 PROCEDURE — 2500000005 HC RX 250 GENERAL PHARMACY W/O HCPCS: Performed by: ORTHOPAEDIC SURGERY

## 2024-08-02 RX ORDER — LIDOCAINE HYDROCHLORIDE 20 MG/ML
INJECTION, SOLUTION INFILTRATION; PERINEURAL AS NEEDED
Status: DISCONTINUED | OUTPATIENT
Start: 2024-08-02 | End: 2024-08-02

## 2024-08-02 RX ORDER — CEFAZOLIN SODIUM 2 G/100ML
2 INJECTION, SOLUTION INTRAVENOUS ONCE
Status: DISCONTINUED | OUTPATIENT
Start: 2024-08-02 | End: 2024-08-02 | Stop reason: HOSPADM

## 2024-08-02 RX ORDER — FENTANYL CITRATE 50 UG/ML
INJECTION, SOLUTION INTRAMUSCULAR; INTRAVENOUS AS NEEDED
Status: DISCONTINUED | OUTPATIENT
Start: 2024-08-02 | End: 2024-08-02

## 2024-08-02 RX ORDER — SODIUM CHLORIDE, SODIUM LACTATE, POTASSIUM CHLORIDE, CALCIUM CHLORIDE 600; 310; 30; 20 MG/100ML; MG/100ML; MG/100ML; MG/100ML
100 INJECTION, SOLUTION INTRAVENOUS CONTINUOUS
Status: DISCONTINUED | OUTPATIENT
Start: 2024-08-02 | End: 2024-08-02 | Stop reason: HOSPADM

## 2024-08-02 RX ORDER — MIDAZOLAM HYDROCHLORIDE 5 MG/ML
INJECTION INTRAMUSCULAR; INTRAVENOUS AS NEEDED
Status: DISCONTINUED | OUTPATIENT
Start: 2024-08-02 | End: 2024-08-02

## 2024-08-02 RX ORDER — OXYCODONE HYDROCHLORIDE 5 MG/1
5 TABLET ORAL EVERY 4 HOURS PRN
Status: DISCONTINUED | OUTPATIENT
Start: 2024-08-02 | End: 2024-08-02 | Stop reason: HOSPADM

## 2024-08-02 RX ORDER — FENTANYL CITRATE 50 UG/ML
50 INJECTION, SOLUTION INTRAMUSCULAR; INTRAVENOUS EVERY 5 MIN PRN
Status: DISCONTINUED | OUTPATIENT
Start: 2024-08-02 | End: 2024-08-02 | Stop reason: HOSPADM

## 2024-08-02 RX ORDER — SODIUM CHLORIDE 0.9 G/100ML
IRRIGANT IRRIGATION AS NEEDED
Status: DISCONTINUED | OUTPATIENT
Start: 2024-08-02 | End: 2024-08-02 | Stop reason: HOSPADM

## 2024-08-02 RX ORDER — PROPOFOL 10 MG/ML
INJECTION, EMULSION INTRAVENOUS AS NEEDED
Status: DISCONTINUED | OUTPATIENT
Start: 2024-08-02 | End: 2024-08-02

## 2024-08-02 RX ORDER — MEPERIDINE HYDROCHLORIDE 25 MG/ML
12.5 INJECTION INTRAMUSCULAR; INTRAVENOUS; SUBCUTANEOUS EVERY 10 MIN PRN
Status: DISCONTINUED | OUTPATIENT
Start: 2024-08-02 | End: 2024-08-02 | Stop reason: HOSPADM

## 2024-08-02 RX ORDER — ONDANSETRON HYDROCHLORIDE 2 MG/ML
INJECTION, SOLUTION INTRAVENOUS AS NEEDED
Status: DISCONTINUED | OUTPATIENT
Start: 2024-08-02 | End: 2024-08-02

## 2024-08-02 RX ORDER — CEFAZOLIN 1 G/1
INJECTION, POWDER, FOR SOLUTION INTRAVENOUS AS NEEDED
Status: DISCONTINUED | OUTPATIENT
Start: 2024-08-02 | End: 2024-08-02

## 2024-08-02 RX ORDER — ONDANSETRON HYDROCHLORIDE 2 MG/ML
4 INJECTION, SOLUTION INTRAVENOUS ONCE AS NEEDED
Status: DISCONTINUED | OUTPATIENT
Start: 2024-08-02 | End: 2024-08-02 | Stop reason: HOSPADM

## 2024-08-02 SDOH — HEALTH STABILITY: MENTAL HEALTH: CURRENT SMOKER: 0

## 2024-08-02 ASSESSMENT — PAIN - FUNCTIONAL ASSESSMENT
PAIN_FUNCTIONAL_ASSESSMENT: 0-10

## 2024-08-02 ASSESSMENT — PAIN SCALES - GENERAL
PAINLEVEL_OUTOF10: 0 - NO PAIN
PAINLEVEL_OUTOF10: 7
PAIN_LEVEL: 0
PAINLEVEL_OUTOF10: 6
PAINLEVEL_OUTOF10: 6
PAINLEVEL_OUTOF10: 7
PAINLEVEL_OUTOF10: 2

## 2024-08-02 ASSESSMENT — PAIN DESCRIPTION - DESCRIPTORS
DESCRIPTORS: THROBBING
DESCRIPTORS: SORE
DESCRIPTORS: THROBBING
DESCRIPTORS: THROBBING

## 2024-08-02 ASSESSMENT — PAIN DESCRIPTION - ORIENTATION: ORIENTATION: LEFT

## 2024-08-02 ASSESSMENT — ENCOUNTER SYMPTOMS
GASTROINTESTINAL NEGATIVE: 1
EYES NEGATIVE: 1
CONSTITUTIONAL NEGATIVE: 1
RESPIRATORY NEGATIVE: 1
NUMBNESS: 1
CARDIOVASCULAR NEGATIVE: 1
ARTHRALGIAS: 1
HEMATOLOGIC/LYMPHATIC NEGATIVE: 1
ENDOCRINE NEGATIVE: 1
PSYCHIATRIC NEGATIVE: 1

## 2024-08-02 ASSESSMENT — PAIN DESCRIPTION - LOCATION: LOCATION: FINGER (COMMENT WHICH ONE)

## 2024-08-02 NOTE — ANESTHESIA PREPROCEDURE EVALUATION
Patient: Elena Michelle    Procedure Information       Date/Time: 08/02/24 1000    Procedures:       LEFT CARPAL TUNNEL RELEASE, LEFT CARPOMETACARPAL ARTHROPLASTY WITH POSSIBLE TENDON TRANSFER (Left: Wrist) - REGIONAL BLOCK, C-ARM, ARTHREX EQUIPMENT CMC MINI TIGHTROPE (HOLD)      Hand Thumb Arthroplasty w/Tend Autograft (Left: Hand)    Location: [facility] / [facility]    Surgeons: Sen Perez, DO            Relevant Problems   Neuro   (+) Carpal tunnel syndrome, left upper limb   (+) Carpal tunnel syndrome, right upper limb   (+) Left carpal tunnel syndrome      GI   (+) Chronic diarrhea   (+) Crohn disease (Multi)      Liver   (+) Fatty liver      Musculoskeletal   (+) Carpal tunnel syndrome, left upper limb   (+) Carpal tunnel syndrome, right upper limb   (+) Left carpal tunnel syndrome   (+) Unilateral primary osteoarthritis of first carpometacarpal joint, left hand   (+) Unilateral primary osteoarthritis of first carpometacarpal joint, right hand      HEENT   (+) Chronic ethmoidal sinusitis   (+) Chronic maxillary sinusitis      ID   (+) Acute URI   (+) Viral syndrome       Clinical information reviewed:   Tobacco  Allergies  Meds   Med Hx  Surg Hx   Fam Hx  Soc Hx        NPO Detail:  NPO/Void Status  Date of Last Liquid: 08/01/24  Time of Last Liquid: 2330  Date of Last Solid: 08/01/24  Time of Last Solid: 1800  Last Intake Type: Clear fluids  Time of Last Void: 0700         Physical Exam    Airway  Mallampati: II  TM distance: >3 FB  Neck ROM: full     Cardiovascular    Dental   (+) upper dentures     Pulmonary    Abdominal          Anesthesia Plan    History of general anesthesia?: yes  History of complications of general anesthesia?: no    ASA 2     general   (Axillary nerve block)  The patient is not a current smoker.    intravenous induction   Anesthetic plan and risks discussed with patient.    Plan discussed with CRNA.

## 2024-08-02 NOTE — OP NOTE
LEFT CARPAL TUNNEL RELEASE, LEFT CARPOMETACARPAL ARTHROPLASTY WITH TENDON TRANSFER (L), Hand Thumb Arthroplasty w/Tend Autograft (L) Operative Note     Date: 2024  OR Location: [facility]    Name: Elena Michelle, : 1970, Age: 53 y.o., MRN: [id number], Sex: female      Preoperative diagnosis: Left thumb CMC osteoarthritis.  Left hand first webspace contracture..  Left carpal tunnel syndrome.    Postoperative diagnosis: Left thumb CMC osteoarthritis.  Left hand first webspace contracture..  Left carpal tunnel syndrome.    Procedure planned: Left thumb CMC arthroplasty with end to side tendon transfer of abductor pollicis longus to flexor carpi radialis..  Left carpal tunnel release.    Procedure performed: Left thumb CMC arthroplasty with end to side tendon transfer of abductor pollicis longus to flexor carpi radialis..  Left carpal tunnel release.    Surgeon: Sen Perez D.O.    Assistant: DEMETRIO Rivera  The physician assistant was present to the entire case. Given the nature of the disease process and the procedure to be performed a skilled surgical assistant was necessary during the case. The assistant was necessary in order to hold retractors and directly assist in the operation. A certified scrub tech was at the back table managing instruments and supplies for the surgical case.    Anesthesia: General    Estimated blood loss: Less than 20 mL    Drains: None    Tourniquet: Approximately 30 minutes at 250 mmHg to the well-padded upper arm    Specimens: None    Implants: None    Indications: Patient presented the office with painful left thumb CMC arthritis.  Patient also had symptoms of carpal tunnel syndrome.  The patient experienced failure of nonoperative treatment strategies.  After full discussion regarding risks benefits and alternatives the patient elected to proceed forth with surgery by way of left thumb CMC arthroplasty with left carpal tunnel release.  Informed consent was signed and placed  in the chart.    Complications: None noted at the time of surgery    Description of operation: The patient was taken to the operative suite and placed in the supine position on the operating table.  A timeout was performed and the left hand was confirmed to be the operative site.  The patient was carefully positioned on the table in such a fashion as to pad all bony prominences and peripheral nerves.  The patient was administered appropriate IV antibiotics and general anesthesia.  The patient was then prepped and draped in the normal sterile fashion.  After prepping and draping an apex volar chevron shaped incision was marked out over the base of the thumb centered over the CMC articulation.  A 1 cm incision was marked out over the metaphyseal diaphyseal junction of the dorsal second metacarpal slightly ulnar to midline.  A sterile Esmarch was then used to exsanguinate the upper extremity and tourniquet was raised to 250 mmHg. The 15 blade was then used to incise skin over the base of the thumb.  Tenotomy scissors were used to gently dissect down to the subcutaneous plane, taking care to identify and protect the dorsal sensory branches of the radial nerve.  The sensory branches were protected through the remainder of the case.  We then developed the interval between the extensor pollicis brevis and abductor pollicis longus.  With the tendons retracted we created a full-thickness capsular incision down to bone directly overlying the CMC articulation.  Sharp dissection was used to release the soft tissue attachments to the trapezium.  An osteotome was then used to quarter the trapezium and the trapezium fragments were then removed in a piecemeal fashion with the Rongeur.  Care was taken to avoid iatrogenic injury to the underlying FCR tendon and capsular structures.  2-0 Ethibond suture was then used to perform end to side tendon transfer of flexor carpi radialis to abductor pollicis longus.  Care was taken to take  bites within the FCR tendon close to its insertion site.  A sling was created by way of a grasping stitch within the abductor pollicis longus suturing it to the FCR tendon.  Not only did this provide a sling that secured axial length of the construct but it also allowed the metacarpal to be positioned in greater abduction and extension, a more functional position.  Attention was then turned to the carpal tunnel release.  The 15 blade was used to incise skin directly overlying the transverse carpal ligament in line with the ring finger ray.  Dissection was carried sharply through the subcutaneous plane.  The longitudinal fibers of the palmar aponeurosis were split in line with their fibers exposing the transverse fibers of the transverse carpal ligament.  These were then meticulously released working from distal to proximal taking care to avoid iatrogenic injury to the underlying contents of the carpal tunnel.  The tenotomy scissors were then used to release the most proximal fibers of the transverse carpal ligament along with the most distal fibers of the antebrachial fascia.  This was done under direct visualization.  The distal release of the carpal tunnel was carried to the level of the fat change.  Both direct inspection and digital palpation confirmed complete release of the carpal tunnel.  The tourniquet was deflated.  Meticulous hemostasis was achieved.  Vital stitches were used to close the capsular layer over the CMC articulation.  Interrupted nylon stitches were used to close the skin.  The patient was then placed into a nonadherent dressing and thumb spica short arm plaster splint.  The patient was then allowed to arise from general anesthesia and taken to recovery in stable condition.  Overall the patient tolerated the procedure well.    Disposition: Stable to PACU          Sen Perez  Phone Number: [phone]

## 2024-08-02 NOTE — ANESTHESIA PROCEDURE NOTES
Airway  Date/Time: 8/2/2024 9:41 AM  Urgency: elective    Airway not difficult    Staffing  Performed: CRNA   Authorized by: Paul Busby MD    Performed by: SAVANNAH Camejo-ISH  Patient location during procedure: OR    Indications and Patient Condition  Indications for airway management: anesthesia  Spontaneous ventilation: present  Sedation level: deep  Preoxygenated: yes  Patient position: sniffing  MILS maintained throughout  Mask difficulty assessment: 0 - not attempted  Planned trial extubation    Final Airway Details  Final airway type: supraglottic airway      Successful airway: classic  Size 4     Number of attempts at approach: 1

## 2024-08-02 NOTE — ANESTHESIA POSTPROCEDURE EVALUATION
Patient: Elena Michelle    Procedure Summary       Date: 08/02/24 Room / Location: [facility]    Anesthesia Start: 0937 Anesthesia Stop:     Procedures:       LEFT CARPAL TUNNEL RELEASE, LEFT CARPOMETACARPAL ARTHROPLASTY WITH TENDON TRANSFER (Left: Wrist)      Hand Thumb Arthroplasty w/Tend Autograft (Left: Hand) Diagnosis:       Carpal tunnel syndrome, left upper limb      Unilateral primary osteoarthritis of first carpometacarpal joint, left hand      Pre-op exam      (Carpal tunnel syndrome, left upper limb [G56.02])      (Unilateral primary osteoarthritis of first carpometacarpal joint, left hand [M18.12])      (Pre-op exam [Z01.818])    Surgeons: Sen Perez DO Responsible Provider: Paul Busby MD    Anesthesia Type: general ASA Status: 2            Anesthesia Type: general    Vitals Value Taken Time   /69 08/02/24 1033   Temp 36.1 08/02/24 1036   Pulse 82 08/02/24 1034   Resp 16 08/02/24 1036   SpO2 95 % 08/02/24 1034   Vitals shown include unfiled device data.    Anesthesia Post Evaluation    Patient location during evaluation: bedside  Patient participation: complete - patient participated  Level of consciousness: sleepy but conscious  Pain score: 0  Pain management: adequate  Airway patency: patent  Cardiovascular status: acceptable, blood pressure returned to baseline and hemodynamically stable  Respiratory status: acceptable, face mask, nonlabored ventilation and spontaneous ventilation  Hydration status: acceptable  Postoperative Nausea and Vomiting: none        No notable events documented.

## 2024-08-02 NOTE — H&P
History and Physical      CHIEF COMPLAINT:  No chief complaint on file.       HISTORY OF PRESENT ILLNESS:      The patient is a 53 y.o. female with significant past medical history of arthritis, GERD, Crohn's, fatty liver who presents with left carpal tunnel syndrome and left thumb CMC osteoarthritis.    Past Medical History:  Past Medical History:   Diagnosis Date    Arthritis     Aspirin sensitivity     BMI 31.0-31.9,adult     Carpal tunnel syndrome     Chronic diarrhea     Chronic ethmoidal sinusitis     Chronic maxillary sinusitis     COVID-19     NOT VACCINATED    Crohn's colitis (Multi)     Crohn's colitis (Multi)     Decreased sense of smell     Fatty liver     GERD (gastroesophageal reflux disease)     Ileitis     Memory loss     Nasal polyp     Pain in left foot 2021    Left foot pain    Pain in right foot 2021    Right foot pain    Plantar fasciitis, bilateral     Post-nasal drip     Rhinorrhea     Wears contact lenses     Wears dentures     Wears glasses       Past Surgical History:    Past Surgical History:   Procedure Laterality Date     SECTION, CLASSIC      CHOLECYSTECTOMY      COLONOSCOPY      (Fiberoptic)    HYSTERECTOMY      NOSE SURGERY      Rhinologic surgery    TONSILLECTOMY           Medications Prior to Admission:    No current facility-administered medications on file prior to encounter.     Current Outpatient Medications on File Prior to Encounter   Medication Sig Dispense Refill    acetaminophen (Tylenol) 500 mg tablet Take 1 tablet (500 mg) by mouth every 8 hours if needed for pain.      budesonide EC (Entocort EC) 3 mg 24 hr capsule Take 3 capsules (9 mg) by mouth once daily in the morning. 90 capsule 5    omeprazole (PriLOSEC) 40 mg DR capsule Take 1 capsule (40 mg) by mouth once daily. Do not crush or chew. 30 capsule 11    risankizumab-rzaa (Skyrizi) 150 mg/mL syringe Inject 1 mL (150 mg) under the skin see administration instructions. Inject 150mg under the  skin every 12 weeks after induction infusions 1 mL 3       Allergies:  Hydrocodone-acetaminophen, Ibuprofen, Naproxen sodium, Nsaids (non-steroidal anti-inflammatory drug), Oxycodone-acetaminophen, and Propoxyphene-acetaminophen    Social History:   Social History     Socioeconomic History    Marital status:      Spouse name: Not on file    Number of children: Not on file    Years of education: Not on file    Highest education level: Not on file   Occupational History    Not on file   Tobacco Use    Smoking status: Former     Types: Cigarettes    Smokeless tobacco: Never   Vaping Use    Vaping status: Never Used   Substance and Sexual Activity    Alcohol use: Not Currently    Drug use: Never    Sexual activity: Yes     Partners: Male   Other Topics Concern    Not on file   Social History Narrative    Not on file     Social Determinants of Health     Financial Resource Strain: Not on file   Food Insecurity: Not on file   Transportation Needs: Not on file   Physical Activity: Not on file   Stress: Not on file   Social Connections: Not on file   Intimate Partner Violence: Not on file   Housing Stability: Not on file       Family History:   Family History   Problem Relation Name Age of Onset    No Known Problems Mother      No Known Problems Father      No Known Problems Sister      No Known Problems Brother      No Known Problems Daughter      No Known Problems Son      No Known Problems Mother's Sister      No Known Problems Mother's Brother      No Known Problems Father's Sister      No Known Problems Father's Brother      No Known Problems Maternal Grandmother      No Known Problems Maternal Grandfather      No Known Problems Paternal Grandmother      No Known Problems Paternal Grandfather      No Known Problems Other         Review of Systems   Constitutional: Negative.    HENT: Negative.     Eyes: Negative.    Respiratory: Negative.     Cardiovascular: Negative.    Gastrointestinal: Negative.    Endocrine:  "Negative.    Genitourinary: Negative.    Musculoskeletal:  Positive for arthralgias (Left thumb CMC).   Neurological:  Positive for numbness (Median nerve distribution left hand).   Hematological: Negative.    Psychiatric/Behavioral: Negative.          Vitals:  /75   Pulse 86   Temp 36.4 °C (97.5 °F)   Resp 19   Ht 1.549 m (5' 1\")   Wt 74.8 kg (164 lb 14.5 oz)   SpO2 96%   BMI 31.16 kg/m²     Physical Exam  Constitutional:       Appearance: Normal appearance.   HENT:      Head: Normocephalic and atraumatic.      Mouth/Throat:      Mouth: Mucous membranes are moist.   Eyes:      Pupils: Pupils are equal, round, and reactive to light.   Cardiovascular:      Pulses: Normal pulses.      Comments: Appears grossly hemodynamically stable  Pulmonary:      Effort: Pulmonary effort is normal.      Breath sounds: Normal breath sounds. No wheezing.   Abdominal:      Palpations: Abdomen is soft.   Musculoskeletal:         General: Swelling and tenderness (Left thumb CMC) present.      Cervical back: Neck supple.   Skin:     General: Skin is warm and dry.      Capillary Refill: Capillary refill takes less than 2 seconds.   Neurological:      Mental Status: She is alert and oriented to person, place, and time.      Comments: Positive Tinel's left median nerve at the wrist   Psychiatric:         Mood and Affect: Mood normal.         Behavior: Behavior normal.         Thought Content: Thought content normal.         Judgment: Judgment normal.            Assessment:  Left carpal tunnel syndrome and left thumb CMC osteoarthritis recalcitrant to nonoperative treatment strategies.      Plan:  Scheduled for left carpal tunnel release with concomitant left thumb CMC arthroplasty with possible tendon transfer.     Cora Rachel PA-C  8/2/2024  7:53 AM    "

## 2024-08-02 NOTE — ANESTHESIA PROCEDURE NOTES
Peripheral Block    Patient location during procedure: holding area  Start time: 8/2/2024 9:00 AM  End time: 8/2/2024 9:08 AM  Reason for block: at surgeon's request and post-op pain management  Staffing  Performed: attending   Authorized by: Paul Busby MD    Performed by: Paul Busby MD  Preanesthetic Checklist  Completed: patient identified, IV checked, site marked, risks and benefits discussed, surgical consent, monitors and equipment checked, pre-op evaluation and timeout performed   Timeout performed at: 8/2/2024 9:00 AM  Peripheral Block  Patient position: laying flat  Prep: ChloraPrep  Patient monitoring: heart rate, cardiac monitor and continuous pulse ox  Block type: axillary  Laterality: left  Injection technique: single-shot  Guidance: ultrasound guided  Local infiltration: lidocaine  Infiltration strength: 1 %  Dose: 1 mL  Needle  Needle type: short-bevel   Needle gauge: 21 G  Needle length: 10 cm  Needle localization: ultrasound guidance     image stored in chart  Needle insertion depth: 2 cm  Assessment  Injection assessment: negative aspiration for heme, no paresthesia on injection, incremental injection and local visualized surrounding nerve on ultrasound  Paresthesia pain: none  Heart rate change: no  Slow fractionated injection: yes  Additional Notes  Risks, benefits, complications and alternatives discussed with patient.  Patient agrees to proceed with procedure.  Midazolam 5mg IV.  US guided, lateral IP approach.  Total of 20cc 0.5% ropivacaine with epi 1:200k and decadron 5mg injected in 3-5cc aliquots around each of the MC, median, ulnar and radial nerves after negative aspirations.  No complications noted.

## 2024-08-05 ENCOUNTER — TELEPHONE (OUTPATIENT)
Dept: ORTHOPEDIC SURGERY | Facility: CLINIC | Age: 54
End: 2024-08-05
Payer: COMMERCIAL

## 2024-08-05 NOTE — TELEPHONE ENCOUNTER
Keenan from [facility] called asking for information on patients CTR sx on 08/2/2024. Would like a call back at [phone] ext [phone]

## 2024-08-07 ENCOUNTER — TELEPHONE (OUTPATIENT)
Dept: ORTHOPEDIC SURGERY | Facility: CLINIC | Age: 54
End: 2024-08-07

## 2024-08-07 ENCOUNTER — OFFICE VISIT (OUTPATIENT)
Dept: ORTHOPEDIC SURGERY | Facility: CLINIC | Age: 54
End: 2024-08-07
Payer: COMMERCIAL

## 2024-08-07 DIAGNOSIS — M18.9 OSTEOARTHRITIS OF CARPOMETACARPAL (CMC) JOINT OF THUMB, UNSPECIFIED LATERALITY, UNSPECIFIED OSTEOARTHRITIS TYPE: Primary | ICD-10-CM

## 2024-08-07 PROCEDURE — 99211 OFF/OP EST MAY X REQ PHY/QHP: CPT | Performed by: INTERNAL MEDICINE

## 2024-08-07 RX ORDER — OXYCODONE AND ACETAMINOPHEN 5; 325 MG/1; MG/1
1 TABLET ORAL EVERY 8 HOURS PRN
Qty: 20 TABLET | Refills: 0 | Status: CANCELLED | OUTPATIENT
Start: 2024-08-07 | End: 2024-08-14

## 2024-08-07 RX ORDER — OXYCODONE AND ACETAMINOPHEN 5; 325 MG/1; MG/1
1 TABLET ORAL EVERY 8 HOURS PRN
Qty: 20 TABLET | Refills: 0 | Status: SHIPPED | OUTPATIENT
Start: 2024-08-07 | End: 2024-08-14

## 2024-08-07 NOTE — PROGRESS NOTES
Acute Injury New Patient Visit    CC: No chief complaint on file.      HPI: Elena is a 53 y.o. female s/p left carpal tunnel release and carpometacarpal arthroplasty with tendon transfer 08/02/2024. She has been noting swelling around her fingers. She denies any fevers, chills or night sweats. She is elevating the hand appropriately.  She is taking Tramadol for pain but it is not giving her any relief. She is icing the area.                 Review of Systems   GENERAL: Negative for malaise, significant weight loss, fever  MUSCULOSKELETAL: See HPI  NEURO:  Negative for numbness / tingling     Past Medical History  Past Medical History:   Diagnosis Date    Arthritis     Aspirin sensitivity     BMI 31.0-31.9,adult     Carpal tunnel syndrome     Chronic diarrhea     Chronic ethmoidal sinusitis     Chronic maxillary sinusitis     COVID-19     NOT VACCINATED    Crohn's colitis (Multi)     Crohn's colitis (Multi)     Decreased sense of smell     Fatty liver     GERD (gastroesophageal reflux disease)     Ileitis     Memory loss     Nasal polyp     Pain in left foot 07/01/2021    Left foot pain    Pain in right foot 07/01/2021    Right foot pain    Plantar fasciitis, bilateral     Post-nasal drip     Rhinorrhea     Wears contact lenses     Wears dentures     Wears glasses        Medication review  Medication Documentation Review Audit       Reviewed by Charisma Moody RN (Registered Nurse) on 08/02/24 at 0754      Medication Order Taking? Sig Documenting Provider Last Dose Status   acetaminophen (Tylenol) 500 mg tablet [id number] No Take 1 tablet (500 mg) by mouth every 8 hours if needed for pain. Historical Provider, MD More than a month Active   budesonide EC (Entocort EC) 3 mg 24 hr capsule [id number] Yes Take 3 capsules (9 mg) by mouth once daily in the morning. Haresh Parnell MD Past Week Active   omeprazole (PriLOSEC) 40 mg DR capsule [id number] No Take 1 capsule (40 mg) by mouth once daily. Do not crush or chew.    Patient not taking: Reported on 2024    Haresh Parnell MD More than a month Active   risankizumab-rzaa (Skyrizi) 150 mg/mL syringe [id number] No Inject 1 mL (150 mg) under the skin see administration instructions. Inject 150mg under the skin every 12 weeks after induction infusions Haresh Parnell MD Unknown Active                    Allergies  Allergies   Allergen Reactions    Hydrocodone-Acetaminophen Itching     Vicodin    Ibuprofen Other     WATERY EYES, NASAL CONGESTION, PUFFY EYES    Naproxen Sodium Other     WATERY EYES, NASAL CONGESTION, PUFFY EYES    Nsaids (Non-Steroidal Anti-Inflammatory Drug) Other     WATERY EYES, NASAL CONGESTION, PUFFY EYES    Oxycodone-Acetaminophen Itching    Propoxyphene-Acetaminophen Swelling and Angioedema       Social History  Social History     Socioeconomic History    Marital status:      Spouse name: Not on file    Number of children: Not on file    Years of education: Not on file    Highest education level: Not on file   Occupational History    Not on file   Tobacco Use    Smoking status: Former     Types: Cigarettes    Smokeless tobacco: Never   Vaping Use    Vaping status: Never Used   Substance and Sexual Activity    Alcohol use: Not Currently    Drug use: Never    Sexual activity: Yes     Partners: Male   Other Topics Concern    Not on file   Social History Narrative    Not on file     Social Determinants of Health     Financial Resource Strain: Not on file   Food Insecurity: Not on file   Transportation Needs: Not on file   Physical Activity: Not on file   Stress: Not on file   Social Connections: Not on file   Intimate Partner Violence: Not on file   Housing Stability: Not on file       Surgical History  Past Surgical History:   Procedure Laterality Date     SECTION, CLASSIC      CHOLECYSTECTOMY      COLONOSCOPY      (Fiberoptic)    HYSTERECTOMY      NOSE SURGERY      Rhinologic surgery    TONSILLECTOMY         Physical Exam:  GENERAL:  Patient is awake,  alert, and oriented to person place and time.  Patient appears well nourished and well kept.  Affect Calm, Not Acutely Distressed.  HEENT:  Normocephalic, Atraumatic, EOMI  CARDIOVASCULAR:  Hemodynamically stable.  RESPIRATORY:  Normal respirations with unlabored breathing.  Extremity: Left hand shows incision is clean, dry and intact.  There is no erythema.  There is no active drainage.  No clinical signs of infection.  Satisfactory capillary refill time.      Diagnostics: None today      Procedure: none     Assessment: 1. Incision check.     Plan: Elena s/p left carpal tunnel release and carpometacarpal arthroplasty with tendon transfer 08/02/2024 with swelling and soreness around the incision. Incisions is clean dry and intact, no clinical signs of infections.  A new thumb spica splint was applied, we refilled her pain medication, she will follow up with Dr. Perez next week.     No orders of the defined types were placed in this encounter.     At the conclusion of the visit there were no further questions by the patient/family regarding their plan of care.  Patient was instructed to call or return with any issues, questions, or concerns regarding their injury and/or treatment plan described above.     Scribe Attestation  By signing my name below, I, Patty Carolina, Scrib[employer] attest that this documentation has been prepared under the direction and in the presence of Francisca Quispe MD. Office: [phone]    This note was prepared using voice recognition software.  The details of this note are correct and have been reviewed, and corrected to the best of my ability.  Some grammatical errors may persist related to the Dragon software.

## 2024-08-07 NOTE — TELEPHONE ENCOUNTER
Someone named Keenan [facility] stating they need to confirm the name and date of the procedure. Also, needs to have the treatment necessary during recovery and the estimated RTW date. The claim number is [id number]. He can be reached at [phone] ext. [phone]

## 2024-08-09 ENCOUNTER — DOCUMENTATION (OUTPATIENT)
Dept: ORTHOPEDIC SURGERY | Facility: CLINIC | Age: 54
End: 2024-08-09
Payer: COMMERCIAL

## 2024-08-15 ENCOUNTER — HOSPITAL ENCOUNTER (OUTPATIENT)
Dept: RADIOLOGY | Facility: CLINIC | Age: 54
Discharge: HOME | End: 2024-08-15
Payer: COMMERCIAL

## 2024-08-15 ENCOUNTER — EVALUATION (OUTPATIENT)
Dept: OCCUPATIONAL THERAPY | Facility: CLINIC | Age: 54
End: 2024-08-15
Payer: COMMERCIAL

## 2024-08-15 ENCOUNTER — OFFICE VISIT (OUTPATIENT)
Dept: ORTHOPEDIC SURGERY | Facility: CLINIC | Age: 54
End: 2024-08-15
Payer: COMMERCIAL

## 2024-08-15 DIAGNOSIS — M25.60 JOINT STIFFNESS: Primary | ICD-10-CM

## 2024-08-15 DIAGNOSIS — M19.049 CMC ARTHRITIS: ICD-10-CM

## 2024-08-15 DIAGNOSIS — M18.9 OSTEOARTHRITIS OF CARPOMETACARPAL (CMC) JOINT OF THUMB, UNSPECIFIED LATERALITY, UNSPECIFIED OSTEOARTHRITIS TYPE: Primary | ICD-10-CM

## 2024-08-15 DIAGNOSIS — G56.02 CARPAL TUNNEL SYNDROME OF LEFT WRIST: ICD-10-CM

## 2024-08-15 PROCEDURE — L3808 WHFO, RIGID W/O JOINTS: HCPCS

## 2024-08-15 PROCEDURE — 73140 X-RAY EXAM OF FINGER(S): CPT | Mod: LEFT SIDE | Performed by: ORTHOPAEDIC SURGERY

## 2024-08-15 PROCEDURE — 99211 OFF/OP EST MAY X REQ PHY/QHP: CPT | Performed by: ORTHOPAEDIC SURGERY

## 2024-08-15 PROCEDURE — 97165 OT EVAL LOW COMPLEX 30 MIN: CPT | Mod: GO

## 2024-08-15 PROCEDURE — 73140 X-RAY EXAM OF FINGER(S): CPT | Mod: LT

## 2024-08-15 ASSESSMENT — PATIENT HEALTH QUESTIONNAIRE - PHQ9
1. LITTLE INTEREST OR PLEASURE IN DOING THINGS: NOT AT ALL
2. FEELING DOWN, DEPRESSED OR HOPELESS: NOT AT ALL
SUM OF ALL RESPONSES TO PHQ9 QUESTIONS 1 AND 2: 0

## 2024-08-15 NOTE — PROGRESS NOTES
Occupational Therapy Upper Extremity Evaluation Note    Date: 8/15/2024    Time In:1104  Time Out: 1130  Total Time:26 minutes    Charges:  HC OT OCCUPATIONAL THERAPY EVAL LOW COMPLEX 30 MINS  23821 (CPT®)  HC OT WRIST/HAND/FINGER ORTHOSIS, RIGID W/O JOINTS, CUSTOM RILEY      NAME: Elena Michelle  : 1970  MRN: [id number]    Chart reviewed: yes  Referring Physician: Dr Perez  for: orthosis, ROM, edema and scar management  Diagnosis:  Left CMC osteoarthritis and left carpal tunnel  Date of onset  8/15/2024/ Surgery 2024                ( 1 week and 6 days  post-surgery)  Precautions: NWB    Insurance  [employer]  Visit Number: 1  Visits Allowed:23  Authorization: not needed  Date Range: n/a    Subjective  Prior level of function: independent  Current level of function: Assist needed due to reports of high levels of pain. 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  Pain,10/10 pain description: sharp and dull and Location: left wrist  Chief Complaint:Pain  Patient's goal for therapy:Return to work  Patient's preferred learning style: visual, auditory, read/written, kinesthetic  Outcome Measure:  Initial evaluation Quick DASH 75%    Objective  Observation: Patient supporting hand  Clinical presentation: stable  Skin/ Wound/Scar: sutures out, surgical scars tender and moderately adhered  Edema: moderate left thumb and hand  Sensation: Tingling/numbness left thumb   Dexterity/ Coordination: unable to complete fine motor activities    Upper Extremity ROM   Elbow extension/flexion: full  Forearm supination/pronation: full  Wrist extension/flexion: 0/0  Radial deviation/ulnar deviation: 0/0    Hand ROM  AROM   THUMB      Kapandji 0    Palmar Abduction NT    Radial Abduction NT     Finger (DPC)        Index Middle Ring Small   cm 5 5 4 4   Full extension of digits noted             Hand Strength-NT                Gross grasp(dynamometer) (lbs)                             (2nd setting) Right          Left                Pinch (lbs)                             Key  right  left                            De Leon   right     left    ADLS/IADLS: Difficult as patient is using her non-dominant right hand to complete.      Treatment Completed this Date: Re-instructed patient in ROM and edema reduction. Fabricated custom thermoplastic thumb spica orthosis. Purpose, wearing schedule, precautions, and care reviewed. Patient reporting high level of pain 10/10 just coming from the doctor. Session short due to reports of high pain and decreased tolerance to therapy this date.      HEP   see treatment    Assessment  Patient is a 54 y/o left hand dominate  female known to stated therapist from previous OT intervention for her right thumb and hand. Patient diagnosed with left CMC osteoarthritis and carpal tunnel and is 1 week 6 days post-operative left thumb CMC arthroplasty and left carpal tunnel release on 8/2/2024. Patient reports 10/10 pain and decreased independence with ADLs/ IADLs. Patient demonstrates decreased ROM, edema, tender and adhered scar,  newly healing structure requiring orthosis, and decreased independence with ADLs/IADLs  per Quick DASH score. Patient will benefit from attending occupational therapy to improve functional use of her left  hand.     Plan of Care  Goals to be achieved by 5  weeks    Patient's level of independence with ADLs/ IADLs will improve by at least 50% per the quick DASH by discharge.    Patient will demonstrate full wrist ROM to complete ADLs/IADLs independently by discharge.    Patient will demonstrate full thumb ROM  and full composite flexion of digits to improve participation in ADLs/IADLs by discharge.    Patient will report understanding of home program, demonstrate independence and verbalize precautions.    Patient will report follow through with wearing of orthosis as instructed by therapist.    Patient will report pain free use of hand for completing ADLs/IADLS by  discharge.    Patient's scar will be supple and demonstrate good healing that does not limit function by discharge.       Intervention    Therapeutic exercises, Therapeutic activity, manual therapy, orthosis, fluidotherapy, paraffin, taping, patient education, home program    Rehabilitation Potential:    good    Potential limiting factors for rehabilitation: pain    Plan  Frequency 1-2/week  Duration 5 weeks    Patient and therapist developed goals for therapy and patient verbalizing agreement to plan of care

## 2024-08-15 NOTE — PROGRESS NOTES
8/15/2024    Chief Complaint   Patient presents with    Left Hand - Pain     Lt thumb cmc arthroplasty   CTR  DOS: 8.2.24  Xray today        History of Present Illness:  Patient Elena Michelle , 53 y.o. female, presents today, 8/15/2024, for evaluation of left hand  CMC arthroplasty and carpal tunnel release, 2 weeks postop .  She reports more pain and swelling with this go around for surgery than she recalls on the other side.,  However numbness and tingling has improved overall.  She has been compliant with splinting regimen to the operative limb.  She has been icing and elevating.  She denies any fevers, chills, constitutional symptoms.       Review of Systems:   GENERAL: Negative  GI: Negative  MUSCULOSKELETAL: See HPI  SKIN: Negative  NEURO:  Negative     Physical Exam:  GENERAL:  Alert and oriented to person, place, and time.  No acute distress and breathing comfortably; pleasant and cooperative with the examination.  HEENT:  Head is normocephalic and atraumatic.  NECK:  Supple, no visible swelling.  CARDIOVASCULAR:  No palpable tachycardia.  LUNGS:  No audible wheezing or labored breathing.  ABDOMEN:  Nondistended.  Extremities: The surgical incision is clean, dry, intact, and appears to be healing well.  No active bleeding, erythema, warmth, drainage, or signs of infection.  Appropriate functional ROM demonstrated with flexion/extension of the digits, and flexion/extension/pronosupination of the wrist.     Imaging/Test Results:  3 views of the thumb taken in the office today show no acute fracture dislocation.  Previous surgical resection trapezium is noted with good alignment throughout all 3 planes.  No evidence of subsidence of the metacarpal.     Assessment:  Left thumb CMC arthroplasty and carpal tunnel release, 2 weeks postop doing well overall.     Plan:  Sutures were removed in the office today.  The patient will have restrictions of nonweightbearing to the left upper extremity.  We discussed and  reviewed home exercise program for range of motion recovery, scar massage, and desensitization techniques.  Occupational Therapy referral order was provided for custom splint fabrication and CMC rehab protocols .  The patient will follow up with our office in 4 weeks, repeat x-rays 3 views of the left thumb upon return.  All patient questions answered at today's visit.    Cora Rachel PA-C

## 2024-08-21 ENCOUNTER — TREATMENT (OUTPATIENT)
Dept: OCCUPATIONAL THERAPY | Facility: CLINIC | Age: 54
End: 2024-08-21
Payer: COMMERCIAL

## 2024-08-21 DIAGNOSIS — M25.60 JOINT STIFFNESS: Primary | ICD-10-CM

## 2024-08-21 PROCEDURE — 97110 THERAPEUTIC EXERCISES: CPT | Mod: GO

## 2024-08-21 NOTE — PROGRESS NOTES
Occupational Therapy Upper Extremity Evaluation Note    Date: 2024    Time In:1150  Time Out:1232  Total Time:42 minutes    Charges:  HC OT THERAPEUTIC EXERCISES  99097 (CPT®)Mods:GOQty:3 units, 42 minutes    NAME: Elena Michelle  : 1970  MRN: [id number]    Chart reviewed: yes  Referring Physician: Dr Perez  for: orthosis, ROM, edema and scar management  Diagnosis:  Left CMC osteoarthritis and left carpal tunnel  Date of onset  8/15/2024   Surgery 2024                ( 2 week and 5 days post-surgery)  Precautions: NWB    Insurance  [employer]  Visit Number: 2  Visits Allowed:23  Authorization: not needed  Date Range: n/a    Subjective  Prior level of function: independent  Current level of function: Assist needed due to reports of high levels of pain. 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  Pain 7-8*/10 pain description: sharp and dull and Location: left wrist, left index finger  Chief Complaint:Pain  Patient's goal for therapy:Return to work  Patient's preferred learning style: visual, auditory, read/written, kinesthetic  Outcome Measure:  Initial evaluation Quick DASH 75%    Objective  Observation: Patient to therapy with thumb spica on   Clinical presentation: stable  Skin/ Wound/Scar: sutures out, surgical scars tender and moderately adhered  Edema: moderate left thumb and hand  Sensation: Tingling/numbness left thumb   Dexterity/ Coordination: unable to complete fine motor activities    Upper Extremity ROM   Elbow extension/flexion: full  Forearm supination/pronation: full  Wrist extension/flexion: 55*/25*  Radial deviation/ulnar deviation: 15/25    Left Hand ROM  AROM   THUMB      Kapandji 0    Palmar Abduction 50    Radial Abduction 55   Left thumb MP 0/45  IP 0/40    Finger (DPC)        Index Middle Ring Small   cm 5.5 2.5* 2.5* 1.5*   Full extension of digits noted     Left index AROM  MP 0/60  PIP 0/16  DIP 0/12                      PROM   MP 0/90  PIP 0/32  DIP  0/28    Hand Strength-NT                Gross grasp(dynamometer) (lbs)                             (2nd setting) Right         Left                Pinch (lbs)                             Key  right  left                            De Leon   right     left    ADLS/IADLS: Difficult as patient is using her non-dominant right hand to complete.      Treatment Completed this Date: Re-evaluated patient's status and discussed findings with patient. custom thermoplastic thumb spica orthosis. Purpose, wearing schedule, precautions, and care reviewed. Patient reporting pain in her index since surgery . She was fitted with compression sleeve for left index with purpose, wearing schedule, precautions and care discussed. She received paraffin to her index followed by ROM with low tolerance to moving index. She was fitted with trapper for index for AAROM pain free. Small modification made to thumb spica for increased comfort.      HEP   see treatment    Assessment  Patient is a 52 y/o left hand dominate  female known to stated therapist from previous OT intervention for her right thumb and hand. Patient diagnosed with left CMC osteoarthritis and carpal tunnel and is 2 week 5 days post-operative left thumb CMC arthroplasty and left carpal tunnel release on 8/2/2024. Patient reports improvement in pain, but decreased motion for her index finger for composite flexion. She did not tolerated  exercises well this date. She did make gains in ROM for her wrist this date. Patient will continue to  benefit from attending occupational therapy to improve functional use of her left  hand.     Plan of Care  Goals to be achieved by 5  weeks    Patient's level of independence with ADLs/ IADLs will improve by at least 50% per the quick DASH by discharge.    Patient will demonstrate full wrist ROM to complete ADLs/IADLs independently by discharge.    Patient will demonstrate full thumb ROM  and full composite flexion of digits to improve  participation in ADLs/IADLs by discharge.    Patient will report understanding of home program, demonstrate independence and verbalize precautions.    Patient will report follow through with wearing of orthosis as instructed by therapist.    Patient will report pain free use of hand for completing ADLs/IADLS by discharge.    Patient's scar will be supple and demonstrate good healing that does not limit function by discharge.       Intervention    Therapeutic exercises, Therapeutic activity, manual therapy, orthosis, fluidotherapy, paraffin, taping, patient education, home program    Rehabilitation Potential:    good    Potential limiting factors for rehabilitation: pain    Plan  Frequency 1-2/week  Duration 5 weeks    Patient and therapist developed goals for therapy and patient verbalizing agreement to plan of care

## 2024-08-28 ENCOUNTER — TREATMENT (OUTPATIENT)
Dept: OCCUPATIONAL THERAPY | Facility: CLINIC | Age: 54
End: 2024-08-28
Payer: COMMERCIAL

## 2024-08-28 DIAGNOSIS — M25.60 JOINT STIFFNESS: ICD-10-CM

## 2024-08-28 PROCEDURE — 97110 THERAPEUTIC EXERCISES: CPT | Mod: GO

## 2024-08-28 PROCEDURE — 97761 PROSTHETIC TRAING 1ST ENC: CPT | Mod: GO

## 2024-08-28 NOTE — PROGRESS NOTES
Occupational Therapy Upper Extremity Evaluation Note    Date: 2024    Time In:852  Time Out:930  Total Time: 38 minutes    Charges:  HC OT THERAPEUTIC EXERCISES  30054 (CPT®)Mods:GOQty:2 units, 23 minutes   HC OT PROSTHETIC TRAINING, EACH 15 MIN  75971 (CPT®)    NAME: Elena Michelle  : 1970  MRN: [id number]    Chart reviewed: yes  Referring Physician: Dr Perez  for: orthosis, ROM, edema and scar management  Diagnosis:  Left CMC osteoarthritis and left carpal tunnel  Date of onset  8/15/2024   Surgery 2024                ( 3 weeks and 5 days post-surgery)  Precautions: NWB    Insurance  [employer]  Visit Number: 3  Visits Allowed:23  Authorization: not needed  Date Range: n/a    Subjective  Patient reports she can't move her index finger and has a lot of pain in it. She states she called doctor and is waiting for response. She states she tries to push on her index but it hurts too much.  Prior level of function: independent  Current level of function: Assist needed due to reports of high levels of pain. 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  Pain 6/10, 8-9*/10 with motion,  pain description: sharp and dull and Location: left wrist, left index finger  Chief Complaint:Pain  Patient's goal for therapy:Return to work  Patient's preferred learning style: visual, auditory, read/written, kinesthetic  Outcome Measure:  Initial evaluation Quick DASH 75%    Objective  Observation: Patient to therapy with thumb spica on   Clinical presentation: stable  Skin/ Wound/Scar: sutures out, surgical scars tender and moderately adhered  Edema: moderate left thumb and index, dorsum of hand.  Right index (cm) P1 6.2 ,PIP 6.1, P2  5.3,  P15.0                                                                                                 Left  index          P1  6.0 ,  PIP 5.9,  P2 5.1,  P3 4.7  Sensation: Tingling/numbness left thumb   Dexterity/ Coordination: unable to complete fine motor  activities    Upper Extremity ROM   Elbow extension/flexion: full  Forearm supination/pronation: full  Wrist extension/flexion: 55/35*  Radial deviation/ulnar deviation: 15/25    Left Hand ROM  AROM   THUMB      Kapandji 0    Palmar Abduction 50    Radial Abduction 55   Left thumb MP 0/45  IP 0/25    Finger (DPC)        Index Middle Ring Small   cm 4.5* DPC DPC* DPC*   Full extension of digits noted     Left index AROM  MP 0/72*  PIP 0/60*  DIP 0/14*                      PROM   MP 0/90  PIP 0/70*  DIP 0/28*    Hand Strength-NT                Gross grasp(dynamometer) (lbs)                             (2nd setting) Right         Left                Pinch (lbs)                             Key  right  left                            De Leon   right     left    ADLS/IADLS: Difficult as patient is using her non-dominant right hand to complete.      Treatment Completed this Date: Re-evaluated patient's status and discussed findings with patient. Orthosis adjusted for increase in edema noted. She received paraffin to her digits excluding her thumb and received PROM to digits. Patient completed ball roll for her wrist ROM. She was fitted with XL finger compression sleeve for her index with purpose, wearing schedule, precautions, and care discussed. Reviewed home program and edema reduction techniques.    HEP   see treatment    Assessment  Patient is a 54 y/o left hand dominate  female known to stated therapist from previous OT intervention for her right thumb and hand. Patient diagnosed with left CMC osteoarthritis and carpal tunnel and is 3 weeks 5 days post-operative left thumb CMC arthroplasty and left carpal tunnel release on 8/2/2024. Patient reports continued pain in her index, but demonstrates improvement in motion for her index finger for composite flexion. She did tolerate light exercises  this date. She did make further gains in ROM for wrist flexion this date. Patient will continue to  benefit from attending  occupational therapy to improve functional use of her left  hand.     Plan of Care  Goals to be achieved by 5  weeks    Patient's level of independence with ADLs/ IADLs will improve by at least 50% per the quick DASH by discharge.    Patient will demonstrate full wrist ROM to complete ADLs/IADLs independently by discharge.    Patient will demonstrate full thumb ROM  and full composite flexion of digits to improve participation in ADLs/IADLs by discharge.    Patient will report understanding of home program, demonstrate independence and verbalize precautions.    Patient will report follow through with wearing of orthosis as instructed by therapist.    Patient will report pain free use of hand for completing ADLs/IADLS by discharge.    Patient's scar will be supple and demonstrate good healing that does not limit function by discharge.       Intervention    Therapeutic exercises, Therapeutic activity, manual therapy, orthosis, fluidotherapy, paraffin, taping, patient education, home program    Rehabilitation Potential:    good    Potential limiting factors for rehabilitation: pain    Plan  Frequency 1-2/week  Duration 5 weeks    Patient and therapist developed goals for therapy and patient verbalizing agreement to plan of care

## 2024-09-04 DIAGNOSIS — K50.919 CROHN'S DISEASE WITH COMPLICATION, UNSPECIFIED GASTROINTESTINAL TRACT LOCATION (MULTI): Primary | ICD-10-CM

## 2024-09-05 ENCOUNTER — TREATMENT (OUTPATIENT)
Dept: OCCUPATIONAL THERAPY | Facility: CLINIC | Age: 54
End: 2024-09-05
Payer: COMMERCIAL

## 2024-09-05 DIAGNOSIS — M25.60 JOINT STIFFNESS: ICD-10-CM

## 2024-09-05 PROCEDURE — 97110 THERAPEUTIC EXERCISES: CPT | Mod: GO

## 2024-09-05 PROCEDURE — 97018 PARAFFIN BATH THERAPY: CPT | Mod: GO

## 2024-09-05 NOTE — PROGRESS NOTES
Occupational Therapy Upper Extremity Evaluation Note    Date: 2024    Time In: 850  Time Out:930  Total Time: 40 minutes    Charges:  HC OT PARAFFIN BATH THERAPY  56157 (CPT®)  HC OT THERAPEUTIC EXERCISES  98292 (CPT®)Mods:GOQty:2 units, 30 minutes  HC OT HAND/FINGER ORTHOSIS, W/O JOINTS, PREFAB      NAME: Elena Michelle  : 1970  MRN: [id number]    Chart reviewed: yes  Referring Physician: Dr Perez  for: orthosis, ROM, edema and scar management  Diagnosis:  Left CMC osteoarthritis and left carpal tunnel  Date of onset  8/15/2024   Surgery 2024                ( 4 weeks and 6 days post-surgery)  Precautions: NWB    Insurance  [employer]  Visit Number: 4  Visits Allowed:23  Authorization: not needed  Date Range: n/a    Subjective  Patient reports she can't move her index finger and has a lot of pain in it. She states she called doctor and is waiting for response. She states she tries to push on her index but it hurts too much.  Prior level of function: independent  Current level of function: Assist needed due to reports of high levels of pain. 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  Pain 6/10, 7*/10 with motion,  pain description: sharp and dull and Location: left wrist, left index finger  Chief Complaint:Pain  Patient's goal for therapy:Return to work  Patient's preferred learning style: visual, auditory, read/written, kinesthetic  Outcome Measure:  Initial evaluation Quick DASH 75%    Objective  Observation: Patient to therapy with thumb spica on   Clinical presentation: stable  Skin/ Wound/Scar: sutures out, surgical scars tender and moderately adhered  Edema: moderate left thumb and index, dorsum of hand.    Left  index  P3 6.4  PIP 6.2  P2 5.4 P1  5.0  Sensation: Tingling/numbness left thumb   Dexterity/ Coordination: unable to complete fine motor activities    Upper Extremity ROM   Elbow extension/flexion: full  Forearm supination/pronation: full  Wrist  extension/flexion: 32/20    ( was 55/35)  Radial deviation/ulnar deviation: 10/30    Left Hand ROM  AROM   THUMB      Kapandji 0    Palmar Abduction 50    Radial Abduction 55   Left thumb MP 0/45  IP 0/25    Finger (DPC)        Index Middle Ring Small   cm 7.0 DPC DPC* DPC*   Full extension of digits noted                                          Left index AROM  MP 0/70  PIP   0/50  DIP   0/18                     ( was  MP 0/72  PIP 0/60*  DIP 0/14*)                      PROM   MP 0/90  PIP 0/70*  DIP 0/28*    Hand Strength-NT                Gross grasp(dynamometer) (lbs)                             (2nd setting) Right         Left                Pinch (lbs)                             Key  right  left                            De Leon   right     left    ADLS/IADLS: Difficult as patient is using her non-dominant right hand to complete.      Treatment Completed this Date: Re-evaluated patient's status and discussed findings with patient. Increased tightness noted left index.  She received parrafin in combination with gentle pain free PROM. Patient completed ball roll for her wrist ROM.  She completed cone stacking  and sponge .  She was fitted with left large comfort cool with purpose, wearing schedule, precautions, and care discussed. Reviewed home program.    HEP   see treatment    Assessment  Patient is a 52 y/o left hand dominate  female known to stated therapist from previous OT intervention for her right thumb and hand. Patient diagnosed with left CMC osteoarthritis and carpal tunnel and is  4 weeks 6 days post-operative left thumb CMC arthroplasty and left carpal tunnel release on 8/2/2024. Patient reports continued pain in her index and thumb with decreased motion noted for index. She did tolerate light exercises  this date. She did make further gains in ROM for wrist flexion this date. Patient will continue to  benefit from attending occupational therapy to improve functional use of her left  hand.      Plan of Care  Goals to be achieved by 5  weeks    Patient's level of independence with ADLs/ IADLs will improve by at least 50% per the quick DASH by discharge.    Patient will demonstrate full wrist ROM to complete ADLs/IADLs independently by discharge.    Patient will demonstrate full thumb ROM  and full composite flexion of digits to improve participation in ADLs/IADLs by discharge.    Patient will report understanding of home program, demonstrate independence and verbalize precautions.    Patient will report follow through with wearing of orthosis as instructed by therapist.    Patient will report pain free use of hand for completing ADLs/IADLS by discharge.    Patient's scar will be supple and demonstrate good healing that does not limit function by discharge.       Intervention    Therapeutic exercises, Therapeutic activity, manual therapy, orthosis, fluidotherapy, paraffin, taping, patient education, home program    Rehabilitation Potential:    good    Potential limiting factors for rehabilitation: pain    Plan  Frequency 1-2/week  Duration 5 weeks    Patient and therapist developed goals for therapy and patient verbalizing agreement to plan of care

## 2024-09-10 ENCOUNTER — TREATMENT (OUTPATIENT)
Dept: OCCUPATIONAL THERAPY | Facility: CLINIC | Age: 54
End: 2024-09-10
Payer: COMMERCIAL

## 2024-09-10 DIAGNOSIS — M25.60 JOINT STIFFNESS: ICD-10-CM

## 2024-09-10 PROCEDURE — 97018 PARAFFIN BATH THERAPY: CPT | Mod: GO

## 2024-09-10 PROCEDURE — 97110 THERAPEUTIC EXERCISES: CPT | Mod: GO

## 2024-09-10 NOTE — PROGRESS NOTES
Occupational Therapy Upper Extremity Progress Note    Date: 9/10/2024    Time In: 803  Time Out:842  Total Time:39 minutes    Charges:  HC OT PARAFFIN BATH THERAPY  23223 (CPT®)  HC OT THERAPEUTIC EXERCISES  99932 (CPT®)Mods:GOQty:2 units, 34 minutes      NAME: Elena Michelle  : 1970  MRN: [id number]    Chart reviewed: yes  Referring Physician: Dr Perez  for: orthosis, ROM, edema and scar management  Diagnosis:  Left CMC osteoarthritis and left carpal tunnel  Date of onset  8/15/2024   Surgery 2024                ( 5 weeks and 4 days post-surgery)  Precautions: NWB    Insurance  [employer]  Visit Number: 5  Visits Allowed:23  Authorization: not needed  Date Range: n/a    Subjective  Patient states DIP of index 6/10 with exercise. She states her hand isn't better and can't touch her thumb to any of her fingers.  Patient reports she can't move her index finger and has a lot of pain in it. She states she called doctor and is waiting for response. She states she tries to push on her index but it hurts too much.  Prior level of function: independent  Current level of function: Assist needed due to reports of high levels of pain. 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  Pain 6/10, 7*/10 with motion,  pain description: sharp and dull and Location: left wrist, left index finger  Chief Complaint:Pain  Patient's goal for therapy:Return to work  Patient's preferred learning style: visual, auditory, read/written, kinesthetic  Outcome Measure:  Initial evaluation Quick DASH 75%    Objective  Observation: Patient to therapy with thumb spica on   Clinical presentation: stable  Skin/ Wound/Scar: sutures out, surgical scars tender and moderately adhered  Edema: moderate left thumb and index, dorsum of hand.    Left  index  P3 6.3*  PIP 6.1*  P2 5.2* P1  5.0  Sensation: Tingling/numbness left thumb   Dexterity/ Coordination: unable to complete fine motor activities    Upper Extremity ROM   Elbow  extension/flexion: full  Forearm supination/pronation: full  Wrist extension/flexion: 55*/25*   ( was 55/35) Pain with wrist extension  Radial deviation/ulnar deviation: 6/35*    Left Hand ROM  AROM   THUMB      Kapandji 0    Palmar Abduction 45    Radial Abduction 50   Left thumb MP 0/45  IP 0/25    Finger (DPC)        Index Middle Ring Small   cm 6.0 DPC DPC DPC   Full extension of digits noted                                          Left index AROM  MP 0/78*  PIP   0/52*  DIP   0/30*                     ( was  MP 0/72  PIP 0/60*  DIP 0/14*)                      PROM   MP 0/90  PIP 0/70  DIP 0/40*    Hand Strength-NT                Gross grasp(dynamometer) (lbs)                             (2nd setting) Right         Left                Pinch (lbs)                             Key  right  left                            De Leon   right     left    ADLS/IADLS: Difficult as patient is using her non-dominant right hand to complete.      Treatment Completed this Date: Re-evaluated patient's status and discussed findings with patient. She made gains  in ROM and edema reduction. She received parrafin in combination with gentle pain free PROM. Patient did not tolerate ball roll due to wrist pain. She received gentle message and was fitted  with XL compression glove. She was instructed in purpose, wearing, schedule, precautions, and care discussed. Reviewed home program.    HEP   see treatment    Assessment  Patient is a 52 y/o left hand dominate  female known to stated therapist from previous OT intervention for her right thumb and hand. Patient diagnosed with left CMC osteoarthritis and carpal tunnel and is  5 weeks 4 days post-operative left thumb CMC arthroplasty and left carpal tunnel release on 8/2/2024. Patient reports continued pain in her index, wrist,  and thumb, but slight improvement in motion noted for index and wrist. Edema improving for her index. She tolerated minimal exercises  this date due to reports of  pain.Patient will continue to  benefit from attending occupational therapy to improve functional use of her left  hand.     Plan of Care  Goals to be achieved by 5  weeks    Patient's level of independence with ADLs/ IADLs will improve by at least 50% per the quick DASH by discharge.    Patient will demonstrate full wrist ROM to complete ADLs/IADLs independently by discharge.    Patient will demonstrate full thumb ROM  and full composite flexion of digits to improve participation in ADLs/IADLs by discharge.    Patient will report understanding of home program, demonstrate independence and verbalize precautions.    Patient will report follow through with wearing of orthosis as instructed by therapist.    Patient will report pain free use of hand for completing ADLs/IADLS by discharge.    Patient's scar will be supple and demonstrate good healing that does not limit function by discharge.       Intervention    Therapeutic exercises, Therapeutic activity, manual therapy, orthosis, fluidotherapy, paraffin, taping, patient education, home program    Rehabilitation Potential:    good    Potential limiting factors for rehabilitation: pain    Plan  Frequency 1-2/week  Duration 5 weeks    Patient and therapist developed goals for therapy and patient verbalizing agreement to plan of care

## 2024-09-12 ENCOUNTER — OFFICE VISIT (OUTPATIENT)
Dept: ORTHOPEDIC SURGERY | Facility: CLINIC | Age: 54
End: 2024-09-12
Payer: COMMERCIAL

## 2024-09-12 ENCOUNTER — HOSPITAL ENCOUNTER (OUTPATIENT)
Dept: RADIOLOGY | Facility: CLINIC | Age: 54
Discharge: HOME | End: 2024-09-12
Payer: COMMERCIAL

## 2024-09-12 DIAGNOSIS — K50.919 CROHN'S DISEASE WITH COMPLICATION, UNSPECIFIED GASTROINTESTINAL TRACT LOCATION: Primary | ICD-10-CM

## 2024-09-12 DIAGNOSIS — M18.9 OSTEOARTHRITIS OF CARPOMETACARPAL (CMC) JOINT OF THUMB, UNSPECIFIED LATERALITY, UNSPECIFIED OSTEOARTHRITIS TYPE: ICD-10-CM

## 2024-09-12 DIAGNOSIS — M19.049 CMC ARTHRITIS: ICD-10-CM

## 2024-09-12 DIAGNOSIS — M65.30 ACQUIRED TRIGGER FINGER: Primary | ICD-10-CM

## 2024-09-12 PROCEDURE — 20550 NJX 1 TENDON SHEATH/LIGAMENT: CPT | Performed by: ORTHOPAEDIC SURGERY

## 2024-09-12 PROCEDURE — 2500000005 HC RX 250 GENERAL PHARMACY W/O HCPCS: Performed by: ORTHOPAEDIC SURGERY

## 2024-09-12 PROCEDURE — 73140 X-RAY EXAM OF FINGER(S): CPT | Mod: LT

## 2024-09-12 PROCEDURE — 73140 X-RAY EXAM OF FINGER(S): CPT | Mod: LEFT SIDE | Performed by: ORTHOPAEDIC SURGERY

## 2024-09-12 PROCEDURE — 99214 OFFICE O/P EST MOD 30 MIN: CPT | Mod: 25 | Performed by: ORTHOPAEDIC SURGERY

## 2024-09-12 PROCEDURE — 20550 NJX 1 TENDON SHEATH/LIGAMENT: CPT | Mod: LT | Performed by: ORTHOPAEDIC SURGERY

## 2024-09-12 PROCEDURE — 99214 OFFICE O/P EST MOD 30 MIN: CPT | Performed by: ORTHOPAEDIC SURGERY

## 2024-09-12 PROCEDURE — 2500000004 HC RX 250 GENERAL PHARMACY W/ HCPCS (ALT 636 FOR OP/ED): Performed by: ORTHOPAEDIC SURGERY

## 2024-09-12 PROCEDURE — 99024 POSTOP FOLLOW-UP VISIT: CPT | Performed by: ORTHOPAEDIC SURGERY

## 2024-09-12 RX ORDER — DIPHENHYDRAMINE HCL 25 MG
25 TABLET ORAL ONCE
Status: CANCELLED | OUTPATIENT
Start: 2024-09-13

## 2024-09-12 RX ORDER — LIDOCAINE HYDROCHLORIDE 10 MG/ML
0.5 INJECTION INFILTRATION; PERINEURAL
Status: COMPLETED | OUTPATIENT
Start: 2024-09-12 | End: 2024-09-12

## 2024-09-12 NOTE — PROGRESS NOTES
"    9/12/2024    Chief Complaint   Patient presents with    Left Hand - Pain, Follow-up     Lt thumb cmc arthroplasty   CTR  DOS: 8.2.24  Xray today        History of Present Illness:  Patient Elena Michelle , 54 y.o. female, presents today, 9/12/2024, for evaluation of left hand  carpal tunnel release and CMC arthroplasty, 6 weeks post-op .  Doing well since last visit, working OT for ROM recovery/CMC protocols.  Numbness and tingling resolving. No new injury or trauma.  Still bracing per OT guidelines.  Feels this side is \"stiffer\" than her other side.  New complaint today of pain and \"catching\" with motion to left index finger over last few weeks.       Review of Systems:   GENERAL: Negative  GI: Negative  MUSCULOSKELETAL: See HPI  SKIN: Negative  NEURO:  Negative     Physical Exam:  GENERAL:  Alert and oriented to person, place, and time.  No acute distress and breathing comfortably; pleasant and cooperative with the examination.  HEENT:  Head is normocephalic and atraumatic.  NECK:  Supple, no visible swelling.  CARDIOVASCULAR:  No palpable tachycardia.  LUNGS:  No audible wheezing or labored breathing.  ABDOMEN:  Nondistended.  Extremities: Evaluation of left upper extremity finds the patient to have a palpable radial artery at the wrist with brisk capillary refill to all digits. The patient has intact sensorium to axillary, radial, median and ulnar nerves. There are no open wounds. There are no signs of infection. There is no evidence of lymphedema or lymphatic streaking. The patient has supple compartments of the left arm, forearm and hand. Surgical incisions well healed.  + TTP A1 pulley of left index finger with mechanical catching with flexion and extension.       Imaging/Test Results:  No acute fracture or dislocation on thumb radiographs in office today.  No evidence of metacarpal subsidence.  Good alignment through all 3 planes.       Assessment:  1: Left carpal tunnel release and CMC arthroplasty, 6 " weeks post-op and doing well.  2: Left index finger trigger digit.     Plan:  Wean from brace and continue ROM recovery and gentle endurance with OT guidance.  New OT requisition provided.  Treatment option reviewed for trigger digit.  Recommendations made for initial non-operative management by way of trigger injection into A1 pulley.  This was performed in office by Dr. Perez and tolerated well by patient.  Follow-up again in 4 weeks for repeat clinical exam, and radiographs 3 views of left thumb upon return.    Hand / UE Inj/Asp: L index A1 for trigger finger on 9/12/2024 9:05 AM  Indications: pain and tendon swelling  Details: 25 G needle, volar approach  Medications: 5 mg triamcinolone acetonide 10 mg/mL; 0.5 mL lidocaine 10 mg/mL (1 %)  Outcome: tolerated well, no immediate complications  Procedure, treatment alternatives, risks and benefits explained, specific risks discussed. Consent was given by the patient. Immediately prior to procedure a time out was called to verify the correct patient, procedure, equipment, support staff and site/side marked as required. Patient was prepped and draped in the usual sterile fashion.       In a face to face encounter, I performed a history and physical examination, discussed pertinent diagnostic studies if indicated, and discussed diagnosis and management strategies with both the patient and the mid-level provider. I reviewed the mid-level's note and agree with the documented findings and plan of care.  Patient presents today for evaluation of left thumb CMC arthroplasty and left carpal tunnel release.  She describes focal pain at A1 pulley to left index with gentle perception of increased work of flexion and painful range of motion to the left index finger through flexion.  There is discrete tenderness and thickening to left index finger at A1 pulley.  Crepitus to digital flexion and extension.  The x-rays look good at site of CMC arthroplasty.  She is stiff to that  left thumb though.  She was given instruction to persist and protocols for recovery from CMC arthroplasty and recommendations for steroid injection to early onset trigger finger to left index.  Patient is agreeable with this strategy.  Plan for follow-up with me in the office in 2 months.  X-rays left thumb upon return.

## 2024-09-13 ENCOUNTER — APPOINTMENT (OUTPATIENT)
Dept: INFUSION THERAPY | Facility: CLINIC | Age: 54
End: 2024-09-13
Payer: COMMERCIAL

## 2024-09-13 DIAGNOSIS — K50.919 CROHN'S DISEASE WITH COMPLICATION, UNSPECIFIED GASTROINTESTINAL TRACT LOCATION (MULTI): Primary | ICD-10-CM

## 2024-09-13 DIAGNOSIS — K50.919 CROHN'S DISEASE WITH COMPLICATION, UNSPECIFIED GASTROINTESTINAL TRACT LOCATION: Primary | ICD-10-CM

## 2024-09-13 RX ORDER — DIPHENHYDRAMINE HCL 25 MG
25 TABLET ORAL ONCE
Status: CANCELLED | OUTPATIENT
Start: 2024-09-18

## 2024-09-17 ENCOUNTER — TELEPHONE (OUTPATIENT)
Dept: GASTROENTEROLOGY | Facility: CLINIC | Age: 54
End: 2024-09-17
Payer: COMMERCIAL

## 2024-09-17 ENCOUNTER — TREATMENT (OUTPATIENT)
Dept: OCCUPATIONAL THERAPY | Facility: CLINIC | Age: 54
End: 2024-09-17
Payer: COMMERCIAL

## 2024-09-17 DIAGNOSIS — M25.60 JOINT STIFFNESS: ICD-10-CM

## 2024-09-17 PROCEDURE — 97110 THERAPEUTIC EXERCISES: CPT | Mod: GO

## 2024-09-17 NOTE — TELEPHONE ENCOUNTER
Patient has 1st Skyrizi infusion tomorrow, 9/18 . She is inquiring if she can take zyrtec and tylenol tomorrow. Pt notified that is ok to do so. She may call with any further questions.

## 2024-09-17 NOTE — PROGRESS NOTES
Occupational Therapy Upper Extremity Re-evaluation/Progress Note    Date: 2024    Time In: 800  Time Out: 850  Total Time:50 minutes    Charges:    HC OT THERAPEUTIC EXERCISES  41359 (CPT®)Mods:GOQty: 3 units, 50  minutes      NAME: Elena Michelle  : 1970  MRN: [id number]    Chart reviewed: yes  Referring Physician: Dr Perez  for: orthosis, ROM, edema and scar management  Diagnosis:  Left CMC osteoarthritis and left carpal tunnel  Date of onset  8/15/2024   Surgery 2024                ( 7 weeks and 3 days post-surgery)  Precautions: NWB    Insurance  [employer]  Visit Number: 6  Visits Allowed:23  Authorization: not needed  Date Range: n/a    Subjective  Patient states DIP of index 4-5/10 with motion. She states her thumb is the same.  Patient reports she can't move her index finger and has a lot of pain in it. She states she called doctor and is waiting for response. She states she tries to push on her index but it hurts too much.  Prior level of function: independent  Current level of function: Assist needed due to reports of high levels of pain. 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  Pain 0/10, 4-5*/10 with motion,  pain description: sharp and dull and Location: left wrist, left index finger  Chief Complaint:Pain  Patient's goal for therapy:Return to work  Patient's preferred learning style: visual, auditory, read/written, kinesthetic  Outcome Measure:  Initial evaluation Quick DASH 75%    Objective  Observation: Patient to therapy with thumb spica on   Clinical presentation: stable  Skin/ Wound/Scar: sutures out, surgical scars tender and moderately adhered  Edema: moderate left thumb and index, dorsum of hand.    Left  index  P3 6.3  PIP 6.0  P2 5.2  P1 5.0 unchanged  Sensation: Tingling/numbness left thumb   Dexterity/ Coordination: unable to complete fine motor activities    Upper Extremity ROM   Elbow extension/flexion: full  Forearm supination/pronation: full  Wrist  extension/flexion: 45/32  ( was 55/35) Pain with wrist extension  Radial deviation/ulnar deviation: 10/35    Left Hand ROM  AROM   THUMB      Kapandji 0    Palmar Abduction 45    Radial Abduction 50   Left thumb MP 0/45  IP 0/25    Finger (DPC)        Index Middle Ring Small   cm 5.5 DPC DPC DPC   Full extension of digits noted                                          Left index AROM  MP 0/70  PIP   0/60*  DIP   0/28                     ( was  MP 0/72  PIP 0/60*  DIP 0/14*)                      PROM   MP 0/90  PIP 0/70  DIP 0/40*    Hand Strength-NT due to lack of motion                Gross grasp(dynamometer) (lbs)                             (2nd setting) Right         Left                Pinch (lbs)                             Key  right  left                            De Leon   right     left    ADLS/IADLS: Difficult as patient is using her non-dominant right hand to complete.      Treatment Completed this Date: Re-evaluated patient's status and discussed findings with patient. She made gains  in  DIP ROM  of her index. She received fluidotherapy while completing wrist and hand ROM. She was begun on gentle hand strengthening exercises with sponges, excluding index.  Yellow and blue sponge issued . Patient completed stretching exercises for her wrist and thumb. Her thermoplastic thumb spica orthosis was remolded to offer gentle stretch to her thumb for improved opposition. She was instructed to wear several times a day for stretching. Patient's orthoses otherwise discharged. She completed stretching exercises for her thumb and wrist.  Goals updated. She is progressing slowly and will benefit from additional 6 weeks of therapy.Reviewed home program.    HEP   see treatment    Assessment  Patient is a 52 y/o left hand dominate  female known to stated therapist from previous OT intervention for her right thumb and hand. Patient diagnosed with left CMC osteoarthritis and carpal tunnel and is  7 weeks 3 days  post-operative left thumb CMC arthroplasty and left carpal tunnel release on 8/2/2024. Patient reports continued pain in her index with minimal improvement noted DIP index flexion. Thumb tight for opposition and orthosis remolded  for stretching. She will wear it intermittently.  She tolerated exercises better this date. Patient will continue to  benefit from attending occupational therapy to improve functional use of her left  hand, for additional 6 weeks.     Plan of Care  Goals to be achieved by 5  weeks    Patient's level of independence with ADLs/ IADLs will improve by at least 50% per the quick DASH by discharge.    Patient will demonstrate full wrist ROM to complete ADLs/IADLs independently by discharge.PM    Patient will demonstrate full thumb ROM  and full composite flexion of digits to improve participation in ADLs/IADLs by discharge.    Patient will report understanding of home program, demonstrate independence and verbalize precautions.PM    Patient will report follow through with wearing of orthosis as instructed by therapist.M    Patient will report pain free use of hand for completing ADLs/IADLS by discharge.PM    Patient's scar will be supple and demonstrate good healing that does not limit function by discharge. M      Intervention    Therapeutic exercises, Therapeutic activity, manual therapy, orthosis, fluidotherapy, paraffin, taping, patient education, home program    Rehabilitation Potential:    good    Potential limiting factors for rehabilitation: pain    Plan  Frequency 1-2/week ( additional 6 weeks 1/week)  Duration 5 weeks    Patient and therapist developed goals for therapy and patient verbalizing agreement to plan of care

## 2024-09-18 ENCOUNTER — INFUSION (OUTPATIENT)
Dept: INFUSION THERAPY | Facility: CLINIC | Age: 54
End: 2024-09-18
Payer: COMMERCIAL

## 2024-09-18 VITALS
OXYGEN SATURATION: 96 % | BODY MASS INDEX: 32.85 KG/M2 | RESPIRATION RATE: 20 BRPM | DIASTOLIC BLOOD PRESSURE: 70 MMHG | SYSTOLIC BLOOD PRESSURE: 112 MMHG | HEIGHT: 61 IN | HEART RATE: 76 BPM | WEIGHT: 174 LBS | TEMPERATURE: 97.7 F

## 2024-09-18 DIAGNOSIS — K50.919 CROHN'S DISEASE WITH COMPLICATION, UNSPECIFIED GASTROINTESTINAL TRACT LOCATION: ICD-10-CM

## 2024-09-18 PROCEDURE — 2500000004 HC RX 250 GENERAL PHARMACY W/ HCPCS (ALT 636 FOR OP/ED): Mod: JZ | Performed by: INTERNAL MEDICINE

## 2024-09-18 PROCEDURE — 96365 THER/PROPH/DIAG IV INF INIT: CPT | Mod: INF

## 2024-09-18 PROCEDURE — 2500000001 HC RX 250 WO HCPCS SELF ADMINISTERED DRUGS (ALT 637 FOR MEDICARE OP): Performed by: INTERNAL MEDICINE

## 2024-09-18 RX ORDER — DIPHENHYDRAMINE HCL 25 MG
25 CAPSULE ORAL ONCE
Status: COMPLETED | OUTPATIENT
Start: 2024-09-18 | End: 2024-09-18

## 2024-09-18 RX ORDER — DIPHENHYDRAMINE HYDROCHLORIDE 50 MG/ML
50 INJECTION INTRAMUSCULAR; INTRAVENOUS AS NEEDED
OUTPATIENT
Start: 2025-01-09

## 2024-09-18 RX ORDER — FAMOTIDINE 10 MG/ML
20 INJECTION INTRAVENOUS ONCE AS NEEDED
OUTPATIENT
Start: 2025-01-09

## 2024-09-18 RX ORDER — DIPHENHYDRAMINE HCL 25 MG
25 CAPSULE ORAL ONCE
OUTPATIENT
Start: 2025-01-09

## 2024-09-18 RX ORDER — EPINEPHRINE 0.3 MG/.3ML
0.3 INJECTION SUBCUTANEOUS EVERY 5 MIN PRN
OUTPATIENT
Start: 2025-01-09

## 2024-09-18 RX ORDER — ALBUTEROL SULFATE 0.83 MG/ML
3 SOLUTION RESPIRATORY (INHALATION) AS NEEDED
OUTPATIENT
Start: 2025-01-09

## 2024-09-18 ASSESSMENT — ENCOUNTER SYMPTOMS
DEPRESSION: 0
OCCASIONAL FEELINGS OF UNSTEADINESS: 0
LOSS OF SENSATION IN FEET: 0

## 2024-09-18 ASSESSMENT — PAIN SCALES - GENERAL: PAINLEVEL: 1

## 2024-09-18 NOTE — PROGRESS NOTES
[facility]   Infusion Clinic Note   Date: 2024   Name: Elena Michelle  : 1970   MRN: [id number]         Reason for Visit: No chief complaint on file.      Accompanied by:Self   Visit Type:: Infusion   Diagnosis: No diagnosis found.    Allergies:   Allergies as of 2024 - Reviewed 2024   Allergen Reaction Noted    Hydrocodone-acetaminophen Itching 2023    Ibuprofen Other 2023    Naproxen sodium Other 2023    Nsaids (non-steroidal anti-inflammatory drug) Other 2023    Oxycodone-acetaminophen Itching 06/10/2024    Propoxyphene-acetaminophen Swelling and Angioedema 06/10/2024      Current Meds has a current medication list which includes the following prescription(s): acetaminophen, budesonide ec, omeprazole, and skyrizi.        Vitals:There were no vitals filed for this visit.   Infusion Pre-procedure Checklist   Allergies reviewed: yes   Medications reviewed: yes   Contraindications to treatment:No   Previous reaction to current treatment:No   Current Health Issues: None   Pain: ' 1   Is the pain different from normal: No   Is the pain tolerable: yes   Is your Doctor aware: yes   Contraindications based on patient history: No   Provider notified: Not applicable   Labs: Labs reviewed   Fall Risk Screening:      Review of Systems - Oncology   Negative for complaint: [] all other systems have been reviewed and are negative for complaint   Infusion Readiness:   Assessment Concerns Related to Infusion: No  Provider notified: n/a  Assess patient for the concerns below. Document provider notification as appropriate:  - Does not meet criteria to treat N/A  - Has an active or recent infection with/without current antibiotic use N/A  - Has recent/planned dental work N/A  - Has recent/planned surgeries N/A  - Has recently received or plans to receive vaccinations N/A  - Has treatment related toxicities N/A  - Is pregnant (unless noted otherwise)  N/A    Initiated By: Yuliana Gomez RN   Time: 9:00 AM     Elena TOSHA Michelle had no medications administered during this visit.

## 2024-09-26 ENCOUNTER — TREATMENT (OUTPATIENT)
Dept: OCCUPATIONAL THERAPY | Facility: CLINIC | Age: 54
End: 2024-09-26
Payer: COMMERCIAL

## 2024-09-26 DIAGNOSIS — M25.60 JOINT STIFFNESS: ICD-10-CM

## 2024-09-26 PROCEDURE — 97018 PARAFFIN BATH THERAPY: CPT | Mod: GO

## 2024-09-26 PROCEDURE — 97110 THERAPEUTIC EXERCISES: CPT | Mod: GO

## 2024-09-26 PROCEDURE — 97140 MANUAL THERAPY 1/> REGIONS: CPT | Mod: GO

## 2024-09-26 NOTE — PROGRESS NOTES
Occupational Therapy Upper Extremity Progress Note    Date: 2024    Time In: 853  Time Out: 931  Total Time: 38 minutes    Charges:  HC OT PARAFFIN BATH THERAPY  48294 (CPT®)  HC OT THERAPEUTIC EXERCISES  63971 (CPT®)Mods:GOQty: 1 units, 18 minutes  HC OT MANUAL THER TECH,1+REGIONS,EA 15 MIN  48669 (CPT®)    NAME: Elena Michelle  : 1970  MRN: [id number]    Chart reviewed: yes  Referring Physician: Dr Perez  for: orthosis, ROM, edema and scar management  Diagnosis:  Left CMC osteoarthritis and left carpal tunnel  Date of onset  8/15/2024   Surgery 2024                ( 7 weeks and 6 days post-surgery)  Precautions: NWB    Insurance  [employer]  Visit Number: 7  Visits Allowed:23  Authorization: not needed  Date Range: n/a    Subjective  Patient states DIP of index and thumb with motion hurts. She states currently 4-5/10 that is shooting pain.  Patient reports she can't move her index finger and has a lot of pain in it. She states she called doctor and is waiting for response. She states she tries to push on her index but it hurts too much.  Prior level of function: independent  Current level of function: Assist needed due to reports of high levels of pain. 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  Pain 4-5/10, 6-7/10 with motion,  pain description: sharp and dull and Location: left wrist, left index finger  Chief Complaint:Pain  Patient's goal for therapy:Return to work  Patient's preferred learning style: visual, auditory, read/written, kinesthetic  Outcome Measure:  Initial evaluation Quick DASH 75%    Objective  Observation: Patient to therapy with thumb spica on   Clinical presentation: stable  Skin/ Wound/Scar: sutures out, surgical scars tender and moderately adhered  Edema: moderate left thumb and index, dorsum of hand.    Left  index  P3 6.2  PIP 6.2  P2 5.6  P1 5.0 ( increased edema except P! Is the same)  Sensation: Tingling/numbness left thumb   Dexterity/  Coordination: unable to complete fine motor activities    Upper Extremity ROM   Elbow extension/flexion: full  Forearm supination/pronation: full  Wrist extension/flexion: 45/32  ( was 55/35) Pain with wrist extension  Radial deviation/ulnar deviation: 10/35    Left Hand ROM  AROM   THUMB      Kapandji 0    Palmar Abduction 60*    Radial Abduction 45   Left thumb MP 0/45  IP 0/40*    Finger (DPC)        Index Middle Ring Small   cm 6.0 DPC DPC DPC   Full extension of digits noted                                          Left index AROM  MP 0/70  PIP   0/52 DIP   0/22                     ( was  MP 0/72  PIP 0/60  DIP 0/28)                      PROM   MP 0/90  PIP 0/70  DIP 0/40*    Hand Strength-NT due to lack of motion                Gross grasp(dynamometer) (lbs)                             (2nd setting) Right         Left                Pinch (lbs)                             Key  right  left                            De Leon   right     left    ADLS/IADLS: Difficult as patient is using her non-dominant right hand to complete.      Treatment Completed this Date: Re-evaluated patient's status and discussed findings with patient.  She received paraffin in conjunction with PROM to digits. Soft tissue mobilization completed. She completed stretching exercises for her hand. Reviewed home program.    HEP   see treatment    Assessment  Patient is a 54 y/o left hand dominate  female known to stated therapist from previous OT intervention for her right thumb and hand. Patient diagnosed with left CMC osteoarthritis and carpal tunnel and is  7 weeks 6 days post-operative left thumb CMC arthroplasty and left carpal tunnel release on 8/2/2024. Patient reports continued pain that she describes as new shooting pain.She tolerated exercises this date. Patient will continue to  benefit from attending occupational therapy to improve functional use of her left  hand.    Plan of Care  Goals to be achieved by 9 weeks    Patient's level  of independence with ADLs/ IADLs will improve by at least 50% per the quick DASH by discharge.    Patient will demonstrate full wrist ROM to complete ADLs/IADLs independently by discharge.PM    Patient will demonstrate full thumb ROM  and full composite flexion of digits to improve participation in ADLs/IADLs by discharge.    Patient will report understanding of home program, demonstrate independence and verbalize precautions.PM    Patient will report follow through with wearing of orthosis as instructed by therapist.M    Patient will report pain free use of hand for completing ADLs/IADLS by discharge.PM    Patient's scar will be supple and demonstrate good healing that does not limit function by discharge. M      Intervention    Therapeutic exercises, Therapeutic activity, manual therapy, orthosis, fluidotherapy, paraffin, taping, patient education, home program    Rehabilitation Potential:    good    Potential limiting factors for rehabilitation: pain    Plan  Frequency 1-2/week ( additional 6 weeks 1/week)  Duration 5 weeks    Patient and therapist developed goals for therapy and patient verbalizing agreement to plan of care

## 2024-09-30 DIAGNOSIS — K50.919 CROHN'S DISEASE WITH COMPLICATION, UNSPECIFIED GASTROINTESTINAL TRACT LOCATION: Primary | ICD-10-CM

## 2024-09-30 RX ORDER — DIPHENHYDRAMINE HCL 25 MG
25 CAPSULE ORAL ONCE
OUTPATIENT
Start: 2024-10-10

## 2024-10-03 ENCOUNTER — OFFICE VISIT (OUTPATIENT)
Dept: ORTHOPEDIC SURGERY | Facility: CLINIC | Age: 54
End: 2024-10-03
Payer: COMMERCIAL

## 2024-10-03 ENCOUNTER — TREATMENT (OUTPATIENT)
Dept: OCCUPATIONAL THERAPY | Facility: CLINIC | Age: 54
End: 2024-10-03
Payer: COMMERCIAL

## 2024-10-03 ENCOUNTER — HOSPITAL ENCOUNTER (OUTPATIENT)
Dept: RADIOLOGY | Facility: CLINIC | Age: 54
Discharge: HOME | End: 2024-10-03
Payer: COMMERCIAL

## 2024-10-03 DIAGNOSIS — M79.645 FINGER PAIN, LEFT: ICD-10-CM

## 2024-10-03 DIAGNOSIS — M65.242 CALCIFIC TENDINITIS OF LEFT HAND: Primary | ICD-10-CM

## 2024-10-03 DIAGNOSIS — M19.049 CMC ARTHRITIS: ICD-10-CM

## 2024-10-03 DIAGNOSIS — M25.60 JOINT STIFFNESS: ICD-10-CM

## 2024-10-03 PROCEDURE — 97018 PARAFFIN BATH THERAPY: CPT | Mod: GO

## 2024-10-03 PROCEDURE — 73140 X-RAY EXAM OF FINGER(S): CPT | Mod: LT

## 2024-10-03 PROCEDURE — 97110 THERAPEUTIC EXERCISES: CPT | Mod: GO

## 2024-10-03 PROCEDURE — 73140 X-RAY EXAM OF FINGER(S): CPT | Mod: LEFT SIDE | Performed by: ORTHOPAEDIC SURGERY

## 2024-10-03 PROCEDURE — 99214 OFFICE O/P EST MOD 30 MIN: CPT | Performed by: ORTHOPAEDIC SURGERY

## 2024-10-03 PROCEDURE — 99213 OFFICE O/P EST LOW 20 MIN: CPT | Performed by: ORTHOPAEDIC SURGERY

## 2024-10-03 PROCEDURE — 97140 MANUAL THERAPY 1/> REGIONS: CPT | Mod: GO

## 2024-10-03 NOTE — PROGRESS NOTES
History present illness: Patient presents today for ongoing evaluation of the left upper extremity.  Patient is status post left thumb CMC arthroplasty done approximately 2 months ago.  Pain to the index finger seems to be worsening.  No benefit from steroid injection.  Pain is more distal today localizing about the PIP and DIP joints.      Past medical history: The patient's past medical history, family history, social history, and review of systems were documented on the patient medical intake.  The updated data was reviewed in the electronic medical record.  History is negative except otherwise stated in history of present illness.        Physical examination:  General: Alert and oriented to person, place, and time.  No acute distress and breathing comfortably: Pleasant and cooperative with examination.  HEENT: Head is normocephalic and atraumatic.  Neck: Supple, no visible swelling.  Cardiovascular: No palpable tachycardia  Lungs: No audible wheezing or labored breathing  Abdomen: Nondistended.  Extremities: Evaluation of the left upper extremity finds the patient had palpable radial artery at the wrist with brisk capillary refill to all digits.  Patient has intact sensation to axillary radial median and ulnar nerves.  There are no open wounds.  There are no signs of infection.  There is no evidence of lymphedema or lymphatic streaking.  The patient has supple compartments to left arm forearm and hand.  Tenderness mild in nature to left index finger at A1 pulley.  Or discrete tenderness about the volar aspect of PIP and even more about the DIP joint.  Digital flexors intact.      Radiology: X-rays of the left index finger show no acute fracture or dislocation.  Calcific change about the volar aspect of the distal interphalangeal joint concerning for calcific tendinitis of the left index finger profundus tendon.      Assessment: Status post left thumb CMC arthroplasty, doing well.  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      Plan: Treatment options were discussed regarding the calcific tendinitis.  We explained that typically the treatment strategy would involve intermittent bracing for soft tissue rest, therapy for motion preservation, and NSAIDs.  She cannot take any type of NSAIDs secondary to swelling about her eyes.  We will have to hold off on that portion of the typical treatment algorithm.  Generally with time we informed that the symptoms would resolve.  Follow-up with me in November as previously scheduled.  X-rays left index finger and left thumb upon return.  Keep on with left thumb CMC arthroplasty protocols and add in treatment for index finger flexor digitorum profundus calcific tendinitis.        Procedure:

## 2024-10-03 NOTE — PROGRESS NOTES
Occupational Therapy Upper Extremity Progress Note    Date: 10/3/2024    Time In: 759  Time Out:843  Total Time: 44  minutes    Charges:  HC OT PARAFFIN BATH THERAPY  89400 (CPT®) 15  HC OT THERAPEUTIC EXERCISES  92769 (CPT®)Mods:GOQty: 2 units, 24 minutes  HC OT MANUAL THER TECH,1+REGIONS,EA 15 MIN  03565 (CPT®) 15    NAME: Elena Michelle  : 1970  MRN: [id number]    Chart reviewed: yes  Referring Physician: Dr Perez  for: orthosis, ROM, edema and scar management  Diagnosis:  Left CMC osteoarthritis and left carpal tunnel  Date of onset  8/15/2024   Surgery 2024                ( 8 weeks and 6 days post-surgery)  Precautions: NWB    Insurance  [employer]  Visit Number: 8  Visits Allowed:23  Authorization: not needed  Date Range: n/a    Subjective  Patient states her index is not moving and is painful when she pushes it to flexion.  Prior level of function: independent  Current level of function: Assist needed due to reports of high levels of pain. 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  Pain 4/10, 6-7/10 with motion,  pain description: sharp and dull and Location: left thenar and   left index finger  Chief Complaint:Pain  Patient's goal for therapy:Return to work  Patient's preferred learning style: visual, auditory, read/written, kinesthetic  Outcome Measure:  Initial evaluation Quick DASH 75%    Objective  Observation: Patient to therapy with thumb spica on   Clinical presentation: stable  Skin/ Wound/Scar: sutures out, surgical scars tender and moderately adhered  Edema: moderate left thumb and index, dorsum of hand.    Left  index  P3 6.2  PIP 6.2  P2 5.6  P1 5.0 ( increased edema except P! Is the same)  Sensation: Tingling/numbness left thumb   Dexterity/ Coordination: unable to complete fine motor activities    Upper Extremity ROM   Elbow extension/flexion: full  Forearm supination/pronation: full  Wrist extension/flexion: 60*/40*   Radial deviation/ulnar deviation:  20*/35    Left Hand ROM  AROM   THUMB      Kapandji 0    Palmar Abduction 60    Radial Abduction 60*   Left thumb MP 0/45  IP 0/40*    Finger (DPC)        Index Middle Ring Small   cm 5.5* DPC DPC DPC   Full extension of digits noted                                          Left index AROM  MP 0/70  PIP   0/52 DIP   0/26                                       PROM   MP 0/90  PIP 0/80  DIP 0/42*    Hand Strength-NT due to lack of motion and patient pleasantly declining  due to index finger pain.                Gross grasp(dynamometer) (lbs)                             (2nd setting) Right         Left                Pinch (lbs)                             Key  right  left                            De Leon   right     left    ADLS/IADLS: Difficult as patient is using her non-dominant right hand to complete.      Treatment Completed this Date: Re-evaluated patient's status and discussed findings with patient.  She received paraffin in conjunction with PROM to her index and thumb. Soft tissue mobilization completed to thumb and thenar eminence. She was issued most resistive sponge (green) and completed exercises. She completed stretching exercises for her hand. Reviewed home program.    HEP   see treatment    Assessment  Patient is a 54 y/o left hand dominate  female known to stated therapist from previous OT intervention for her right thumb and hand. Patient diagnosed with left CMC osteoarthritis and carpal tunnel and is  8 weeks 6 days post-operative left thumb CMC arthroplasty and left carpal tunnel release on 8/2/2024. Patient continues to report pain in her index and difficulty with moving it.She tolerated exercises this date and increased resistive sponge. Patient will continue to  benefit from attending occupational therapy to improve functional use of her left  hand.    Plan of Care  Goals to be achieved by 9 weeks    Patient's level of independence with ADLs/ IADLs will improve by at least 50% per the quick DASH  by discharge.    Patient will demonstrate full wrist ROM to complete ADLs/IADLs independently by discharge.PM    Patient will demonstrate full thumb ROM  and full composite flexion of digits to improve participation in ADLs/IADLs by discharge.    Patient will report understanding of home program, demonstrate independence and verbalize precautions.PM    Patient will report follow through with wearing of orthosis as instructed by therapist.M    Patient will report pain free use of hand for completing ADLs/IADLS by discharge.PM    Patient's scar will be supple and demonstrate good healing that does not limit function by discharge. M      Intervention    Therapeutic exercises, Therapeutic activity, manual therapy, orthosis, fluidotherapy, paraffin, taping, patient education, home program    Rehabilitation Potential:    good    Potential limiting factors for rehabilitation: pain    Plan  Frequency 1-2/week ( additional 6 weeks 1/week)  Duration 5 weeks    Patient and therapist developed goals for therapy and patient verbalizing agreement to plan of care

## 2024-10-08 ENCOUNTER — TELEPHONE (OUTPATIENT)
Dept: GASTROENTEROLOGY | Facility: CLINIC | Age: 54
End: 2024-10-08

## 2024-10-08 ENCOUNTER — TREATMENT (OUTPATIENT)
Dept: OCCUPATIONAL THERAPY | Facility: CLINIC | Age: 54
End: 2024-10-08
Payer: COMMERCIAL

## 2024-10-08 DIAGNOSIS — M25.60 JOINT STIFFNESS: ICD-10-CM

## 2024-10-08 PROCEDURE — 97110 THERAPEUTIC EXERCISES: CPT | Mod: GO

## 2024-10-08 PROCEDURE — L3921 HFO W/JOINT(S) CF: HCPCS

## 2024-10-08 PROCEDURE — 97018 PARAFFIN BATH THERAPY: CPT | Mod: GO

## 2024-10-08 NOTE — TELEPHONE ENCOUNTER
Patient's  called to reschedule an appointment on 10/10/24 for infusion. Please review. Thank you.

## 2024-10-08 NOTE — PROGRESS NOTES
Occupational Therapy Upper Extremity Progress Note    Date: 10/8/2024    Time In: 759  Time Out:840  Total Time:  41 minutes    Charges:  HC OT PARAFFIN BATH THERAPY  85178 (CPT®) 5  HC OT THERAPEUTIC EXERCISES  31090 (CPT®)Mods:GOQty: 2 units, 30 minutes  HC OT HAND/FINGER ORTHOSIS, W/ JOINTS, CUSTOM RILEY      NAME: Elena Michelle  : 1970  MRN: [id number]    Chart reviewed: yes    Referring Physician: Dr Perez  for: orthosis, ROM, edema and scar management  Diagnosis:  Left CMC osteoarthritis and left carpal tunnel:  Date of onset  8/15/2024   Surgery 2024                ( 9 weeks and 4 days post-surgery)  Precautions: NWB    Insurance  [employer]  Visit Number: 9  Visits Allowed:23  Authorization: not needed  Date Range: n/a    Subjective  Patient states she saw her doctor for her index last week.  Prior level of function: independent  Current level of function: Assist needed due to reports of high levels of pain. 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  Pain 4/10, 6-7/10 with motion,  pain description: sharp and dull and Location: left thenar and left index finger  Chief Complaint:Pain  Patient's goal for therapy:Return to work  Patient's preferred learning style: visual, auditory, read/written, kinesthetic  Outcome Measure:  Initial evaluation Quick DASH 75%    Objective  Observation: Patient to therapy with thumb spica on   Clinical presentation: stable  Skin/ Wound/Scar: sutures out, surgical scars tender and moderately adhered  Edema: moderate left thumb and index, dorsum of hand.    Left  index  P3 6.2  PIP 6.2  P2 5.6  P1 5.0 ( increased edema except P! Is the same)  Sensation: Tingling/numbness left thumb   Dexterity/ Coordination: unable to complete fine motor activities    Upper Extremity ROM   Elbow extension/flexion: full  Forearm supination/pronation: full  Wrist extension/flexion: 60*/40*   Radial deviation/ulnar deviation: 20*/35    Left Hand ROM  AROM   THUMB       Kapandji 0    Palmar Abduction 60    Radial Abduction 60*   Left thumb MP 0/45  IP 0/40*    Finger (DPC)        Index Middle Ring Small   cm 5.5* DPC DPC DPC   Full extension of digits noted                                          Left index AROM  MP 0/70  PIP   0/52 DIP   0/26                                       PROM   MP 0/90  PIP 0/80  DIP 0/42*    Hand Strength-NT due to lack of motion and patient pleasantly declining  due to index finger pain.                Gross grasp(dynamometer) (lbs)                             (2nd setting) Right         Left                Pinch (lbs)                             Key  right  left                            De Leon   right     left    ADLS/IADLS: Difficult as patient is using her non-dominant right hand to complete.      Treatment Completed this Date:   She received paraffin in conjunction with PROM to her index . She completed stretching exercises for her hand and fine motor with lacing shapes.he was fabricated hand based finger extension orthosis to put her index to rest to be worn intermittently during the day and for nighttime. Purpose, wearing schedule, precautions discussed. Reviewed home program.    HEP   see treatment    Assessment  Patient is a 52 y/o left hand dominate  female known to stated therapist from previous OT intervention for her right thumb and hand. Patient diagnosed with left CMC osteoarthritis and carpal tunnel and is  9 weeks 4 days post-operative left thumb CMC arthroplasty and left carpal tunnel release on 8/2/2024. 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 She was fabricated an HFO and tolerated minimal PROM to her index. She did tolerate AROM exercises. She tolerated exercises this date.. Patient will continue to  benefit from attending occupational therapy to improve functional use of her left   hand.    Plan of Care  Goals to be achieved by 9 weeks    Patient's level of independence with ADLs/ IADLs will improve by at least 50% per the quick DASH by discharge.    Patient will demonstrate full wrist ROM to complete ADLs/IADLs independently by discharge.PM    Patient will demonstrate full thumb ROM  and full composite flexion of digits to improve participation in ADLs/IADLs by discharge.    Patient will report understanding of home program, demonstrate independence and verbalize precautions.PM    Patient will report follow through with wearing of orthosis as instructed by therapist.M  Patient will report follow through with wearing of orthosis as instructed by therapist    Patient will report pain free use of hand for completing ADLs/IADLS by discharge.PM    Patient's scar will be supple and demonstrate good healing that does not limit function by discharge. M      Intervention    Therapeutic exercises, Therapeutic activity, manual therapy, orthosis, fluidotherapy, paraffin, taping, patient education, home program    Rehabilitation Potential:    good    Potential limiting factors for rehabilitation: pain    Plan  Frequency 1-2/week ( additional 6 weeks 1/week)  Duration 5 weeks    Patient and therapist developed goals for therapy and patient verbalizing agreement to plan of care

## 2024-10-09 NOTE — TELEPHONE ENCOUNTER
LVM stating we do not schedule/reschedule infusions that they would have to call the infusion center

## 2024-10-10 ENCOUNTER — INFUSION (OUTPATIENT)
Dept: INFUSION THERAPY | Facility: CLINIC | Age: 54
End: 2024-10-10
Payer: COMMERCIAL

## 2024-10-10 VITALS
BODY MASS INDEX: 32.55 KG/M2 | DIASTOLIC BLOOD PRESSURE: 77 MMHG | SYSTOLIC BLOOD PRESSURE: 125 MMHG | TEMPERATURE: 97.5 F | HEART RATE: 77 BPM | HEIGHT: 61 IN | OXYGEN SATURATION: 97 % | WEIGHT: 172.4 LBS | RESPIRATION RATE: 14 BRPM

## 2024-10-10 DIAGNOSIS — K50.919 CROHN'S DISEASE WITH COMPLICATION, UNSPECIFIED GASTROINTESTINAL TRACT LOCATION: ICD-10-CM

## 2024-10-10 PROCEDURE — 2500000004 HC RX 250 GENERAL PHARMACY W/ HCPCS (ALT 636 FOR OP/ED): Performed by: STUDENT IN AN ORGANIZED HEALTH CARE EDUCATION/TRAINING PROGRAM

## 2024-10-10 PROCEDURE — 2500000001 HC RX 250 WO HCPCS SELF ADMINISTERED DRUGS (ALT 637 FOR MEDICARE OP): Performed by: STUDENT IN AN ORGANIZED HEALTH CARE EDUCATION/TRAINING PROGRAM

## 2024-10-10 PROCEDURE — 96365 THER/PROPH/DIAG IV INF INIT: CPT | Mod: INF

## 2024-10-10 PROCEDURE — 96366 THER/PROPH/DIAG IV INF ADDON: CPT | Mod: INF

## 2024-10-10 RX ORDER — EPINEPHRINE 0.3 MG/.3ML
0.3 INJECTION SUBCUTANEOUS EVERY 5 MIN PRN
OUTPATIENT
Start: 2024-11-07

## 2024-10-10 RX ORDER — DIPHENHYDRAMINE HCL 25 MG
25 CAPSULE ORAL ONCE
Status: COMPLETED | OUTPATIENT
Start: 2024-10-10 | End: 2024-10-10

## 2024-10-10 RX ORDER — DIPHENHYDRAMINE HCL 25 MG
25 CAPSULE ORAL ONCE
OUTPATIENT
Start: 2024-11-07

## 2024-10-10 RX ORDER — DIPHENHYDRAMINE HYDROCHLORIDE 50 MG/ML
50 INJECTION INTRAMUSCULAR; INTRAVENOUS AS NEEDED
OUTPATIENT
Start: 2024-11-07

## 2024-10-10 RX ORDER — FAMOTIDINE 10 MG/ML
20 INJECTION INTRAVENOUS ONCE AS NEEDED
OUTPATIENT
Start: 2024-11-07

## 2024-10-10 RX ORDER — ALBUTEROL SULFATE 0.83 MG/ML
3 SOLUTION RESPIRATORY (INHALATION) AS NEEDED
OUTPATIENT
Start: 2024-11-07

## 2024-10-10 ASSESSMENT — PAIN SCALES - GENERAL: PAINLEVEL: 0-NO PAIN

## 2024-10-10 ASSESSMENT — ENCOUNTER SYMPTOMS
DEPRESSION: 0
LOSS OF SENSATION IN FEET: 0
OCCASIONAL FEELINGS OF UNSTEADINESS: 0

## 2024-10-10 NOTE — PROGRESS NOTES
[facility]   Infusion Clinic Note   Date: October 10, 2024   Name: Elena Michelle  : 1970   MRN: [id number]         Reason for Visit: No chief complaint on file.      Accompanied by:Self   Visit Type:: Infusion   Diagnosis: Crohn's disease with complication, unspecified gastrointestinal tract location    Allergies:   Allergies as of 10/10/2024 - Reviewed 10/10/2024   Allergen Reaction Noted   • Hydrocodone-acetaminophen Itching 2023   • Ibuprofen Other 2023   • Naproxen sodium Other 2023   • Nsaids (non-steroidal anti-inflammatory drug) Other 2023   • Propoxyphene-acetaminophen Swelling and Angioedema 06/10/2024      Current Meds has a current medication list which includes the following prescription(s): acetaminophen, budesonide ec, omeprazole, and skyrizi.        Vitals:  Vitals:    10/10/24 0743   Temp: 36.4 °C (97.5 °F)      Infusion Pre-procedure Checklist   Allergies reviewed: yes   Medications reviewed: yes   Contraindications to treatment:No   Previous reaction to current treatment:No   Current Health Issues: None   Pain: 0-no pain [0]'    Is the pain different from normal: No   Is the pain tolerable: no   Is your Doctor aware: n/a   Contraindications based on patient history: No   Provider notified: Not applicable   Labs: Labs reviewed   Fall Risk Screening:      Review of Systems - Oncology   Negative for complaint: [] all other systems have been reviewed and are negative for complaint   Infusion Readiness:   Assessment Concerns Related to Infusion: No  Provider notified: n/a  Assess patient for the concerns below. Document provider notification as appropriate:  - Does not meet criteria to treat Yes  - Has an active or recent infection with/without current antibiotic use No  - Has recent/planned dental work No  - Has recent/planned surgeries No  - Has recently received or plans to receive vaccinations No  - Has treatment related toxicities No  - Is  pregnant (unless noted otherwise) N/A    Initiated By: Vijaya Paredes RN   Time: 7:45 AM     Elena Michelle had no medications administered during this visit.

## 2024-10-10 NOTE — PATIENT INSTRUCTIONS
[facility] OUTPATIENT [facility]      Pain Infusion Aftercare Instructions      1. It is normal to feel sedated, tired and low in energy after a pain infusion. DO NOT DRIVE, OPERATE ANY MACHINERY, OR MAKE ANY IMPORTANT DECISIONS FOR AT LEAST 24 HOURS AFTER THE INFUSION.     2. Call the pain center at [phone] with any problems, questions, or concerns.     3. Eat light after the infusion. If you feel queasy or sick to your stomach, laying down with your eyes closed may help. When you resume eating start with something mild like clear liquids, yogurt, applesauce, crackers, etc… Gradually advance to a regular diet.     4. Do not leave your house alone the evening of your pain infusion.     5. No alcohol or sedative medications, such as sleeping pills, for 24 hours after your pain infusion.     6. Resume all other prescribed medications unless directed otherwise by you physician.     7. If you have any medical emergencies, call 911 or go directly to the closest emergency room.

## 2024-10-15 ENCOUNTER — TREATMENT (OUTPATIENT)
Dept: OCCUPATIONAL THERAPY | Facility: CLINIC | Age: 54
End: 2024-10-15
Payer: COMMERCIAL

## 2024-10-15 DIAGNOSIS — M25.60 JOINT STIFFNESS: ICD-10-CM

## 2024-10-15 PROCEDURE — 97110 THERAPEUTIC EXERCISES: CPT | Mod: GO

## 2024-10-15 NOTE — PROGRESS NOTES
Occupational Therapy Upper Extremity Progress Note    Date: 10/15/2024    Time In: 802  Time Out: 846  Total Time: 44  minutes    Charges:    HC OT THERAPEUTIC EXERCISES  71940 (CPT®)Mods:GOQty: 3 units, 44 minutes      NAME: Elena Michelle  : 1970  MRN: [id number]    Chart reviewed: yes    Referring Physician: Dr Perez  for: orthosis, ROM, edema and scar management  Diagnosis:  Left CMC osteoarthritis and left carpal tunnel:  Date of onset  8/15/2024   Surgery 2024                ( 10 weeks and 4 days post-surgery)  Precautions: NWB    Insurance  [employer]  Visit Number: 10  Visits Allowed:23  Authorization: not needed  Date Range: n/a    Subjective  Patient states orthosis is bothering her and her hand is stiffer and swollen, but she continued to wear orthosis.  Prior level of function: independent  Current level of function: Assist needed due to reports of high levels of pain. 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  Pain 6/10, 8-9/10 with motion,  pain description: sharp and dull and Location: left thenar and left index finger  Chief Complaint:Pain  Patient's goal for therapy:Return to work  Patient's preferred learning style: visual, auditory, read/written, kinesthetic  Outcome Measure:  Initial evaluation Quick DASH 75%    Objective  Observation: Patient to therapy with thumb spica on   Clinical presentation: stable  Skin/ Wound/Scar: sutures out, surgical scars tender and moderately adhered  Edema: moderate left thumb and index, dorsum of hand.    Left  index  P3 6.0 * PIP 6.0 * P2 5.4 * P1 5.0 )  Sensation: Tingling/numbness left thumb   Dexterity/ Coordination: unable to complete fine motor activities    Upper Extremity ROM   Elbow extension/flexion: full  Forearm supination/pronation: full  Wrist extension/flexion:  55/30       (was 60*/40* )  Radial deviation/ulnar deviation:   (15/30) 20*/35    Left Hand ROM  AROM   THUMB      Kapandji 0    Palmar Abduction 45    Radial  Abduction 50   Left thumb MP 0/45  IP 0/40*    Finger (DPC)        Index Middle Ring Small   cm 6.5* 3.5 3.0 2.5   Full extension of digits noted                                          Left index AROM  MP 0/76*  PIP   0/52 DIP   0/20                                       PROM   MP 0/90  PIP 0/80  DIP 0/42*    Hand Strength-NT due to lack of motion and patient pleasantly declining  due to index finger pain.                Gross grasp(dynamometer) (lbs)                             (2nd setting) Right         Left                Pinch (lbs)                             Key  right  left                            De Leon   right     left    ADLS/IADLS: Difficult as patient is using her non-dominant right hand to complete.      Treatment Completed this Date:  ROM re-evaluated and patient notedto have decreased ROM overall in  her wrist and hand. MP index flexion did improve and edema reduced. Her reports of pain are higher and she received fluidotherapy for pain management and improving ROM. She completed stretching exercises for her hand and fine motor with lacing shapes. Her hand based finger extension orthosis was remolded to put her index in a functional position for comfort and rest.to be worn intermittently during the day and for nighttime. Purpose, wearing schedule, precautions reviewed. Reviewed home program.    HEP   see treatment    Assessment  Patient is a 52 y/o left hand dominate  female known to stated therapist from previous OT intervention for her right thumb and hand. Patient diagnosed with left CMC osteoarthritis and carpal tunnel and is 10 weeks 4 days post-operative left thumb CMC arthroplasty and left carpal tunnel release on 8/2/2024. Patient continues to report pain in her index and difficulty with moving it. 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  She is reporting increased stiffness, pain, and edema. ROM decreased for wrist and digits,  except MP flexion for her index improved. Edema reduction also improved for her index.  She was noted to tolerate index to thumb opposition for lacing after heat and exercises.Her  HFO was remolded to more of a functional position for increased tolerance for her index. She did tolerate AROM exercises.  Patient will continue to  benefit from attending occupational therapy to improve functional use of her left  hand.    Plan of Care  Goals to be achieved by 9 weeks    Patient's level of independence with ADLs/ IADLs will improve by at least 50% per the quick DASH by discharge.    Patient will demonstrate full wrist ROM to complete ADLs/IADLs independently by discharge.PM    Patient will demonstrate full thumb ROM  and full composite flexion of digits to improve participation in ADLs/IADLs by discharge.    Patient will report understanding of home program, demonstrate independence and verbalize precautions.PM    Patient will report follow through with wearing of orthosis as instructed by therapist.M  Patient will report follow through with wearing of orthosis as instructed by therapist    Patient will report pain free use of hand for completing ADLs/IADLS by discharge.PM    Patient's scar will be supple and demonstrate good healing that does not limit function by discharge. M      Intervention    Therapeutic exercises, Therapeutic activity, manual therapy, orthosis, fluidotherapy, paraffin, taping, patient education, home program    Rehabilitation Potential:    good    Potential limiting factors for rehabilitation: pain    Plan  Frequency 1-2/week ( additional 6 weeks 1/week)  Duration 5 weeks    Patient and therapist developed goals for therapy and patient verbalizing agreement to plan of care

## 2024-10-24 ENCOUNTER — TREATMENT (OUTPATIENT)
Dept: OCCUPATIONAL THERAPY | Facility: CLINIC | Age: 54
End: 2024-10-24
Payer: COMMERCIAL

## 2024-10-24 DIAGNOSIS — M25.60 JOINT STIFFNESS: ICD-10-CM

## 2024-10-24 PROCEDURE — 97110 THERAPEUTIC EXERCISES: CPT | Mod: GO

## 2024-10-24 PROCEDURE — 97018 PARAFFIN BATH THERAPY: CPT | Mod: GO

## 2024-10-29 ENCOUNTER — TREATMENT (OUTPATIENT)
Dept: OCCUPATIONAL THERAPY | Facility: CLINIC | Age: 54
End: 2024-10-29
Payer: COMMERCIAL

## 2024-10-29 DIAGNOSIS — G56.02 LEFT CARPAL TUNNEL SYNDROME: ICD-10-CM

## 2024-10-29 DIAGNOSIS — M25.60 JOINT STIFFNESS: Primary | ICD-10-CM

## 2024-10-29 DIAGNOSIS — K50.919 CROHN'S DISEASE WITH COMPLICATION, UNSPECIFIED GASTROINTESTINAL TRACT LOCATION: Primary | ICD-10-CM

## 2024-10-29 PROCEDURE — 97110 THERAPEUTIC EXERCISES: CPT | Mod: GO

## 2024-10-29 PROCEDURE — 97140 MANUAL THERAPY 1/> REGIONS: CPT | Mod: GO

## 2024-10-29 RX ORDER — DIPHENHYDRAMINE HCL 25 MG
25 CAPSULE ORAL ONCE
OUTPATIENT
Start: 2024-11-06

## 2024-11-05 ENCOUNTER — TREATMENT (OUTPATIENT)
Dept: OCCUPATIONAL THERAPY | Facility: CLINIC | Age: 54
End: 2024-11-05
Payer: COMMERCIAL

## 2024-11-05 DIAGNOSIS — G56.02 LEFT CARPAL TUNNEL SYNDROME: ICD-10-CM

## 2024-11-05 DIAGNOSIS — M25.60 JOINT STIFFNESS: Primary | ICD-10-CM

## 2024-11-05 PROCEDURE — 97140 MANUAL THERAPY 1/> REGIONS: CPT | Mod: GO

## 2024-11-05 PROCEDURE — 97530 THERAPEUTIC ACTIVITIES: CPT | Mod: GO

## 2024-11-05 NOTE — PROGRESS NOTES
Occupational Therapy Upper Extremity Progress Note    Date: 2024    Time In: 1107  Time Out:1149  Total Time: 42  minutes    Charges:  HC OT MANUAL THER TECH,1+REGIONS,EA 15 MIN   2 units  25  HC OT THERAPEUT ACTVITY DIRECT PT CONTACT EACH 15 MIN  14453 (CPT®)1 unit, 17  minutes      NAME: Elena Michelle  : 1970  MRN: [id number]    Chart reviewed: yes    Referring Physician: Dr Perez  for: orthosis, ROM, edema and scar management  Diagnosis:  Left CMC osteoarthritis and left carpal tunnel:  Date of onset  8/15/2024   Surgery 2024                ( 13 weeks and 4 days post-surgery)  Precautions: NWB    Insurance  [employer]  Visit Number: 13  Visits Allowed:23  Authorization: not needed  Date Range: n/a    Subjective  Patient states  her index gets sore and 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  Prior level of function: independent  Current level of function: Assist needed due to reports of high levels of pain. 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  Pain: 0/10; 6/10 index, thumb 4/10 with motion,  pain description: sharp and dull and Location: left thenar and left index finger  Chief Complaint:Pain  Patient's goal for therapy:Return to work  Patient's preferred learning style: visual, auditory, read/written, kinesthetic  Outcome Measure:  Initial evaluation Quick DASH 75%    Objective  Observation: Patient to therapy with thumb spica on   Clinical presentation: stable  Skin/ Wound/Scar: sutures out, surgical scars tender and moderately adhered  Edema: moderate left thumb and index, dorsum of hand, uhnchanged  (Left  index  P3 6.3 PIP 6.0  P2 5.2  P1 4.7 )  Sensation: Tingling/numbness left thumb   Dexterity/ Coordination: unable to complete fine motor activities    Upper Extremity ROM   Elbow extension/flexion: full  Forearm supination/pronation: full  Wrist extension/flexion:  75*/50*      Radial deviation/ulnar deviation:   25/30      Left Hand  ROM  AROM   THUMB  A/PROM    Kapandji 7*/ 8    Palmar Abduction 65*    Radial Abduction 65*   Left thumb MP 0/45  IP 0/40*    Finger (DPC)        Index Middle Ring Small   cm 5.0 DPC DPC DPC   Full extension of digits noted                                          Left index AROM  MP 0/60 PIP   0/54  DIP   0/24                                       PROM   MP 0/90  PIP 0/80  DIP 0/42    Hand Strength-NT                Gross grasp(dynamometer) (lbs)                             (2nd setting) Right         Left                Pinch (lbs)                             Key  right  left                            De Leon   right     left    ADLS/IADLS: Difficult as patient is using her non-dominant right hand to complete.      Treatment Completed this Date:  ROM and edema re-evaluated.  Worked on breaking down work tasks including stacking chips and shuffling cards and simulated work tasks. 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 Soft tissue mobilization completed to multiple soft tissue restrictions noted in index and palm.Reviewed home program.    HEP   see treatment    Assessment  Patient is a 54 y/o left hand dominate  female known to stated therapist from previous OT intervention for her right thumb and hand. Patient diagnosed with left CMC osteoarthritis and carpal tunnel and is 13 weeks 4 days post-operative left thumb CMC arthroplasty and left carpal tunnel release on 8/2/2024. 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    Plan of Care Goals to be achieved by 9 weeks    Patient's level of independence with ADLs/ IADLs will improve by at least 50% per the quick DASH by discharge.    Patient will demonstrate full wrist ROM to complete ADLs/IADLs independently by discharge.PM    Patient will demonstrate full thumb  ROM  and full composite flexion of digits to improve participation in ADLs/IADLs by discharge.PM    Patient will report understanding of home program, demonstrate independence and verbalize precautions.PM    Patient will report follow through with wearing of orthosis as instructed by therapist.M  Patient will report follow through with wearing of orthosis as instructed by therapist. PM    Patient will report pain free use of hand for completing ADLs/IADLS by discharge.PM    Patient's scar will be supple and demonstrate good healing that does not limit function by discharge. M      Intervention    Therapeutic exercises, Therapeutic activity, manual therapy, orthosis, fluidotherapy, paraffin, taping, patient education, home program    Rehabilitation Potential:    good    Potential limiting factors for rehabilitation: pain    Plan  Frequency 1-2/week ( additional 6 weeks 1/week)  Duration 5 weeks    Patient and therapist developed goals for therapy and patient verbalizing agreement to plan of care

## 2024-11-06 ENCOUNTER — INFUSION (OUTPATIENT)
Dept: INFUSION THERAPY | Facility: CLINIC | Age: 54
End: 2024-11-06
Payer: COMMERCIAL

## 2024-11-06 VITALS
BODY MASS INDEX: 32.21 KG/M2 | OXYGEN SATURATION: 98 % | DIASTOLIC BLOOD PRESSURE: 86 MMHG | WEIGHT: 170.4 LBS | HEART RATE: 73 BPM | RESPIRATION RATE: 16 BRPM | TEMPERATURE: 96.6 F | SYSTOLIC BLOOD PRESSURE: 128 MMHG

## 2024-11-06 DIAGNOSIS — K50.919 CROHN'S DISEASE WITH COMPLICATION, UNSPECIFIED GASTROINTESTINAL TRACT LOCATION: ICD-10-CM

## 2024-11-06 PROCEDURE — 96365 THER/PROPH/DIAG IV INF INIT: CPT | Mod: INF

## 2024-11-06 PROCEDURE — 2500000001 HC RX 250 WO HCPCS SELF ADMINISTERED DRUGS (ALT 637 FOR MEDICARE OP): Performed by: STUDENT IN AN ORGANIZED HEALTH CARE EDUCATION/TRAINING PROGRAM

## 2024-11-06 PROCEDURE — 2500000004 HC RX 250 GENERAL PHARMACY W/ HCPCS (ALT 636 FOR OP/ED): Mod: JZ | Performed by: STUDENT IN AN ORGANIZED HEALTH CARE EDUCATION/TRAINING PROGRAM

## 2024-11-06 RX ORDER — DIPHENHYDRAMINE HCL 25 MG
25 CAPSULE ORAL ONCE
Status: COMPLETED | OUTPATIENT
Start: 2024-11-06 | End: 2024-11-06

## 2024-11-06 RX ORDER — FAMOTIDINE 10 MG/ML
20 INJECTION INTRAVENOUS ONCE AS NEEDED
OUTPATIENT
Start: 2024-12-04

## 2024-11-06 RX ORDER — EPINEPHRINE 0.3 MG/.3ML
0.3 INJECTION SUBCUTANEOUS EVERY 5 MIN PRN
OUTPATIENT
Start: 2024-12-04

## 2024-11-06 RX ORDER — DIPHENHYDRAMINE HCL 25 MG
25 CAPSULE ORAL ONCE
OUTPATIENT
Start: 2024-12-04

## 2024-11-06 RX ORDER — ALBUTEROL SULFATE 0.83 MG/ML
3 SOLUTION RESPIRATORY (INHALATION) AS NEEDED
OUTPATIENT
Start: 2024-12-04

## 2024-11-06 RX ORDER — DIPHENHYDRAMINE HYDROCHLORIDE 50 MG/ML
50 INJECTION INTRAMUSCULAR; INTRAVENOUS AS NEEDED
OUTPATIENT
Start: 2024-12-04

## 2024-11-06 ASSESSMENT — PAIN SCALES - GENERAL: PAINLEVEL_OUTOF10: 0-NO PAIN

## 2024-11-06 ASSESSMENT — ENCOUNTER SYMPTOMS
DEPRESSION: 0
LOSS OF SENSATION IN FEET: 0
OCCASIONAL FEELINGS OF UNSTEADINESS: 0

## 2024-11-06 NOTE — PATIENT INSTRUCTIONS
Today :We administered diphenhydrAMINE and risankizumab-rzaa (Skyrizi) 600 mg in dextrose 5% 260 mL IV.     For:   1. Crohn's disease with complication, unspecified gastrointestinal tract location       (Tell all doctors including dentists that you are taking this medication)     Go to the emergency room or call 911 if:  -You have signs of allergic reaction:   -Rash, hives, itching.   -Swollen, blistered, peeling skin.   -Swelling of face, lips, mouth, tongue or throat.   -Tightness of chest, trouble breathing, swallowing or talking     Call your doctor:  - If IV / injection site gets red, warm, swollen, itchy or leaks fluid or pus.     (Leave dressing on your IV site for at least 2 hours and keep area clean and dry  - If you get sick or have symptoms of infection or are not feeling well for any reason.    (Wash your hands often, stay away from people who are sick)  - If you have side effects from your medication that do not go away or are bothersome.     (Refer to the teaching your nurse gave you for side effects to call your doctor about)    - Common side effects may include:  stuffy nose, headache, feeling tired, muscle aches, upset stomach  - Before receiving any vaccines     - Call the [facility] at   If:  - You get sick, are on antibiotics, have had a recent vaccine, have surgery or dental work and your doctor wants your visit rescheduled.  - You need to cancel and reschedule your visit for any reason. Call at least 2 days before your visit if you need to cancel.   - Your insurance changes before your next visit.    (We will need to get approval from your new insurance. This can take up to two weeks.)     The [facility] is opened Monday thru Friday. We are closed on weekends and holidays.   Voice mail will take your call if the center is closed. If you leave a message please allow 24 hours for a call back during weekdays. If you leave a message on a weekend/holiday, we will call you back  the next business day.    A pharmacist is available Monday - Friday from 8:30AM to 3:30PM to help answer any questions you may have about your prescriptions(s). Please call pharmacy at:    [facility]: [phone]  [facility]: [phone]  [facility]: [phone]

## 2024-11-06 NOTE — PROGRESS NOTES
[facility]   Infusion Clinic Note   Date: 2024   Name: Elena Michelle  : 1970   MRN: [id number]         Reason for Visit: OP Infusion (skyrizi)      Accompanied by:Self   Visit Type:: Infusion   Diagnosis: Crohn's disease with complication, unspecified gastrointestinal tract location    Allergies:   Allergies as of 2024 - Reviewed 2024   Allergen Reaction Noted    Hydrocodone-acetaminophen Itching 2023    Ibuprofen Other 2023    Naproxen sodium Other 2023    Nsaids (non-steroidal anti-inflammatory drug) Other 2023    Propoxyphene-acetaminophen Swelling and Angioedema 06/10/2024      Current Meds has a current medication list which includes the following prescription(s): acetaminophen, budesonide ec, omeprazole, and skyrizi.        Vitals:  Vitals:    24 0731   Weight: 77.3 kg (170 lb 6.4 oz)      Infusion Pre-procedure Checklist   Allergies reviewed: yes   Medications reviewed: yes   Contraindications to treatment:No   Previous reaction to current treatment:No   Current Health Issues: None   Pain: 0-no pain [0]'    Is the pain different from normal: No   Is the pain tolerable: n/a   Is your Doctor aware: n/a   Contraindications based on patient history: No   Provider notified: Not applicable   Labs: Labs reviewed   Fall Risk Screening:      Review of Systems - Oncology   Negative for complaint: [] all other systems have been reviewed and are negative for complaint   Infusion Readiness:   Assessment Concerns Related to Infusion: No  Provider notified: n/a  Assess patient for the concerns below. Document provider notification as appropriate:  - Does not meet criteria to treat N/A  - Has an active or recent infection with/without current antibiotic use No  - Has recent/planned dental work No  - Has recent/planned surgeries No  - Has recently received or plans to receive vaccinations No  - Has treatment related toxicities N/A  - Is  pregnant (unless noted otherwise) N/A    Initiated By: JOANIE REHMAN RN   Time: 7:34 AM     Elena Michelle had no medications administered during this visit.

## 2024-11-08 ENCOUNTER — APPOINTMENT (OUTPATIENT)
Dept: GASTROENTEROLOGY | Facility: CLINIC | Age: 54
End: 2024-11-08
Payer: COMMERCIAL

## 2024-11-08 VITALS
HEIGHT: 61 IN | HEART RATE: 77 BPM | SYSTOLIC BLOOD PRESSURE: 127 MMHG | DIASTOLIC BLOOD PRESSURE: 81 MMHG | WEIGHT: 172 LBS | BODY MASS INDEX: 32.47 KG/M2

## 2024-11-08 DIAGNOSIS — K50.919 CROHN'S DISEASE WITH COMPLICATION, UNSPECIFIED GASTROINTESTINAL TRACT LOCATION: ICD-10-CM

## 2024-11-08 DIAGNOSIS — R19.7 DIARRHEA, UNSPECIFIED TYPE: ICD-10-CM

## 2024-11-08 DIAGNOSIS — R10.9 ABDOMINAL CRAMPING: ICD-10-CM

## 2024-11-08 DIAGNOSIS — R12 HEARTBURN: ICD-10-CM

## 2024-11-08 DIAGNOSIS — R10.84 GENERALIZED ABDOMINAL PAIN: ICD-10-CM

## 2024-11-08 DIAGNOSIS — Z11.59 ENCOUNTER FOR SCREENING FOR OTHER VIRAL DISEASES: Primary | ICD-10-CM

## 2024-11-08 DIAGNOSIS — F17.210 NICOTINE DEPENDENCE, CIGARETTES, UNCOMPLICATED: ICD-10-CM

## 2024-11-08 DIAGNOSIS — K52.9 ILEITIS: ICD-10-CM

## 2024-11-08 DIAGNOSIS — K29.00 ACUTE GASTRITIS WITHOUT HEMORRHAGE, UNSPECIFIED GASTRITIS TYPE: ICD-10-CM

## 2024-11-08 DIAGNOSIS — K76.0 FATTY LIVER: ICD-10-CM

## 2024-11-08 PROCEDURE — 99215 OFFICE O/P EST HI 40 MIN: CPT | Performed by: STUDENT IN AN ORGANIZED HEALTH CARE EDUCATION/TRAINING PROGRAM

## 2024-11-08 PROCEDURE — 3008F BODY MASS INDEX DOCD: CPT | Performed by: STUDENT IN AN ORGANIZED HEALTH CARE EDUCATION/TRAINING PROGRAM

## 2024-11-08 NOTE — PROGRESS NOTES
10/15/21  54yo  lady with history of cholecystectomy, hysterectomy presenting with chronic history of lower abdominal pain, crampy, positive at night, radiating to her back, not relieved by passing gas or bowel movements, associated with nausea and soft stools up to 20 times per day with mucus, started 10 years ago, occurring once every 2 to 3 years and lasting for few weeks a few months. The patient had a CAT scan of the abdomen in July 2020 showing wall thickening of the terminal ileum, however she mentioned that she was never started on treatment or received an accurate diagnosis. She denies any fever chills vomiting dysphagia odynophagia heartburn melena hematochezia hematemesis.     2/11/2022  Patient will need for follow-up, feeling significantly better after starting budesonide taper, denies any episodes of abdominal pain or diarrhea     5/20/22  patient s here for follow up, symptoms recurred 5 d ago, having lower abd pain stabbing, significant watery diarrhea, nausea and vomiting, tolerating liquids, but vomiting solids     10/2023  Had 2 flare ups in 2022 as per her, Patient was recently admitted to [facility] on 9/2023 with severe abdominal pain and watery diarrhea up to 15 or 20 times a day, watery, stool tests unremarkable, CAT scan 9/2023 showing ileal and pancolonic thickening, got partially better on budesonide 9 mg daily and antibiotics    2/23/2024  Patient seen for follow-up, feeling well, having 2 bowel movements per day, normal, intermittently having 4 bowel movements depending on her diet, not at night, denies abdominal pain, patient did not start on Stelara because she was concerned about side effects    11/8/2024  Patient seen for follow-up, started Skyrizi 9/18/2024, having 1-2 bowel movements per day, has intermittent episodes of looser stools and abdominal discomfort related to her diet, she underwent left carpal tunnel release on 8/2/2024     EGD 7/2020 at [facility], bx alfred in  stomach and duodenum  EGD 10/2023 small HH, mild esophagitis gastritis duodenitis  EGD 5/2024 small hiatal hernia, gastritis duodenitis, biopsy of the stomach and duodenum unremarkable  VCE 11-29-21 showing ileal erosions see official report  VCE 11/2023 patchy small bowel erythema  VCE 5/2024 moderate erythema as of 92% of the small intestine to the colon, fair prep, see official report  Colonoscopy 7/2020 at [facility] normal TI, bx wnl in TI and random colon   Colonoscopy 11/2023 TI erosions , H, ac ta, bx unremarkable  Colonoscopy 5/2024 [place] 9, terminal ileitis, hemorrhoids, patchy erythema in the descending sigmoid and rectum, biopsy of the TI and colon unremarkable  Family history reviewed, not pertinent to chief complaint  Bowel movements as above  Denies NSAIDs, alcohol marijuana drug use, stopped smoking           The note was created using voice recognition transcription software. Despite proofreading, unintentional typographical errors may be present. Please contact the GI office with any questions or concerns.      Current Medications: reviewed     A 10 point review of system is negative except for what is mentioned in the HPI     Follow up with GI was advised         Vital Signs: Reviewed     Physical Exam:  General: no apparent distress, pleasant and cooperative  Skin:  Warm and dry, no jaundice  HEENT: No scleral icterus, no conjunctival pallor, normocephalic, atraumatic, mucous membranes moist  Neck:  atraumatic, trachea midline, no JVD  Chest:  decreased air entry to auscultation bilaterally. No wheezes, rales, or rhonchi  CV:  Regular rate and rhythm.  Positive S1/S2  Abdomen: no distension, +BS, soft, non-tender to palpation, no rebound tenderness, no guarding, no rigidity, no discernible ascites   Extremities: no lower extremity edema, Chronic pigmentary changes, no cyanosis  Neurological:  A&Ox3 , no asterixis  Psychiatric: cooperative      Investigations:  Labs, radiological imaging and cardiac work  up were reviewed     1-hepatitis C antibody negative in 10/2023     2-BMI 32, lifestyle modifications advised     3- HCM, colonoscopy as above, will repeat according to findings below      4-Chronic intermittent abdominal pain and soft stools described as above suspicious for  Crohn's, initially planned for Stelara in 2022 however patient did not start her Stelara because she was concerned about side effects, started budesonide after egd-colono on 5-16-24, currently on Skyrizi since 9/18/2024, will taper down budesonide, suspect additional functional component    5/20/22 abd pain and significant diarrhea recurred,  9/2023 admitted to [facility] with severe abdominal pain and diarrhea, CAT scan 9/2023 showing ileal and pancolonic thickening, egd and colonoscoopy showing non specific findings as above, but was already on budesonide, will require capsule endoscopy for further characterization of small bowel disease, partially better on budesonide, will plan for Stelara  10/2023 received budesonide course  2/26/24 spoke to rheumatology Dr Romero: Suspect some of her symptoms are related to neuropathy however if this is a possibility that her untreated Crohn's may be causing some inflammation within her joints considering a biologic that may include joint coverage would be helpful.      -CAT scan of the abdomen in July 2020 showing wall thickening of the terminal ileum see official report, records of EGD and colonoscopy as above, smoking cessation advised,   10/28/21 discussed with radiology Dr Gastelum, CT from 6/2020 highly suspicious for Crohn's, recent CTE 10/20/21 with resolved findings   11-29-21 VCE showing ileal erosions see official report  12/2/21 discussed with patient, after discussing with radiology and reviewing vce results, findings suggest ileal Crohn's disease, started budesonide with current significant improvement and resolution of symptoms      -7/2024 TPMT normal, negative TB, negative hepatitis B,  prior EBV infection  CRP 0.61 in 1/2024, Calprotectin 8 in 10/2024  CDAI 140 asymp 2/2024, 100 asympt on 11/8/2024        Avoid NSAIDs and smoking, lactose-free low fiber diet, follow crp, stool calprotectin, Dexa scan, will need to stay up to date with vaccinations, yearly dermatology exam, annual Pap smear, follow-up markers (CRP, calprotectin), endoscopic procedures     4- fatty liver, healthy lifestyle advised, fib 4 score F0 F1 in 2/2024    5-recently stopped smoking

## 2024-11-08 NOTE — PATIENT INSTRUCTIONS
1-for your abdominal pain and bowel movements please continue with Vincenzo, follow a lactose free low fiber diet, avoid NSAIDs and smoking, will need to stay up to date with vaccinations with your primary doctor (Hepatitis A and B, Pneumovax and zoster before starting therapy, HPV vaccination, Influenza, COVID-19,  avoid live vaccines once on immunosuppression),  annual Pap smear in women, yearly dermatology exam, will Follow blood and stool tests, endoscopic interventions.  2-please decrease budesonide to 2 tablets daily for the next 2 weeks, then 1 tablet daily for 2 weeks then stop

## 2024-11-11 ENCOUNTER — OFFICE VISIT (OUTPATIENT)
Dept: URGENT CARE | Age: 54
End: 2024-11-11
Payer: COMMERCIAL

## 2024-11-11 ENCOUNTER — SPECIALTY PHARMACY (OUTPATIENT)
Dept: PHARMACY | Facility: CLINIC | Age: 54
End: 2024-11-11

## 2024-11-11 VITALS
TEMPERATURE: 98.2 F | HEART RATE: 83 BPM | HEIGHT: 61 IN | OXYGEN SATURATION: 95 % | RESPIRATION RATE: 16 BRPM | WEIGHT: 170 LBS | DIASTOLIC BLOOD PRESSURE: 79 MMHG | BODY MASS INDEX: 32.1 KG/M2 | SYSTOLIC BLOOD PRESSURE: 112 MMHG

## 2024-11-11 DIAGNOSIS — L23.4 ALLERGIC CONTACT DERMATITIS DUE TO DYES: Primary | ICD-10-CM

## 2024-11-11 PROCEDURE — 96372 THER/PROPH/DIAG INJ SC/IM: CPT | Performed by: PHYSICIAN ASSISTANT

## 2024-11-11 PROCEDURE — 99214 OFFICE O/P EST MOD 30 MIN: CPT | Performed by: PHYSICIAN ASSISTANT

## 2024-11-11 PROCEDURE — 3008F BODY MASS INDEX DOCD: CPT | Performed by: PHYSICIAN ASSISTANT

## 2024-11-11 RX ORDER — DEXAMETHASONE SODIUM PHOSPHATE 10 MG/ML
10 INJECTION INTRAMUSCULAR; INTRAVENOUS ONCE
Status: COMPLETED | OUTPATIENT
Start: 2024-11-11 | End: 2024-11-11

## 2024-11-11 RX ORDER — CLOBETASOL PROPIONATE 0.05 G/100ML
SHAMPOO TOPICAL
Qty: 118 ML | Refills: 0 | Status: SHIPPED | OUTPATIENT
Start: 2024-11-11

## 2024-11-11 NOTE — PROGRESS NOTES
Subjective   Patient ID: Elena Michelle is a 54 y.o. female. They present today with a chief complaint of Allergic Reaction (To hair dye, head, neck and ears burning, itching. Dyed saturday).    History of Present Illness  54-year-old patient presents to clinic with complaints of papular erythematous rash on full scalp extending to posterior neck and behind the ears with associated pruritus, burning, edema ongoing for the past 2 days after patient used a hair dye.  Reports has had similar reaction to hair dye in the past and required multiple treatments with steroids for resolution.  Reports has tried over-the-counter treatments without relief.  Denies discharge, induration, dizziness, angioedema, eye swelling, face swelling, rashes throughout the body, new products other than hair dye injury.    Past Medical History  Allergies as of 2024 - Reviewed 2024   Allergen Reaction Noted    Hydrocodone-acetaminophen Itching 2023    Ibuprofen Other 2023    Naproxen sodium Other 2023    Nsaids (non-steroidal anti-inflammatory drug) Other 2023    Propoxyphene-acetaminophen Swelling and Angioedema 06/10/2024       (Not in a hospital admission)       Past Medical History:   Diagnosis Date    Arthritis     Aspirin sensitivity     BMI 31.0-31.9,adult     Carpal tunnel syndrome     Chronic diarrhea     Chronic ethmoidal sinusitis     Chronic maxillary sinusitis     COVID-19     NOT VACCINATED    Crohn's colitis (Multi)     Crohn's colitis (Multi)     Decreased sense of smell     Fatty liver     GERD (gastroesophageal reflux disease)     Ileitis     Memory loss     Nasal polyp     Pain in left foot 2021    Left foot pain    Pain in right foot 2021    Right foot pain    Plantar fasciitis, bilateral     Post-nasal drip     Rhinorrhea     Wears contact lenses     Wears dentures     Wears glasses        Past Surgical History:   Procedure Laterality Date     SECTION, CLASSIC       "CHOLECYSTECTOMY      COLONOSCOPY      (Fiberoptic)    HYSTERECTOMY      NOSE SURGERY      Rhinologic surgery    TONSILLECTOMY          reports that she has quit smoking. Her smoking use included cigarettes. She has never used smokeless tobacco. She reports that she does not currently use alcohol. She reports that she does not use drugs.    Review of Systems  Review of Systems     ROS negative with the exception as noted on HPI                           Objective    Vitals:    11/11/24 0914   BP: 112/79   BP Location: Left arm   Pulse: 83   Resp: 16   Temp: 36.8 °C (98.2 °F)   SpO2: 95%   Weight: 77.1 kg (170 lb)   Height: 1.549 m (5' 1\")     No LMP recorded. Patient has had a hysterectomy.    Physical Exam  Constitutional:       Appearance: Normal appearance.   HENT:      Head: Normocephalic and atraumatic.   Cardiovascular:      Rate and Rhythm: Normal rate and regular rhythm.      Pulses: Normal pulses.      Heart sounds: Normal heart sounds.   Pulmonary:      Effort: Pulmonary effort is normal. No respiratory distress.      Breath sounds: Normal breath sounds. No wheezing, rhonchi or rales.   Skin:     Comments: Papular erythematous rash at full scalp extending to nape of the neck and behind B/L ears. There is some edema. Area tender to the touch. No warmth or discharge.    Neurological:      Mental Status: She is alert.         Procedures    Point of Care Test & Imaging Results from this visit  No results found for this visit on 11/11/24.   No results found.    Diagnostic study results (if any) were reviewed by Laura Sheth PA-C.    Assessment/Plan   Allergies, medications, history, and pertinent labs/EKGs/Imaging reviewed by Laura Sheth PA-C.   papular erythematous rash on full scalp extending to posterior neck and behind the ears with associated pruritus, burning, edema ongoing for the past 2 days after patient used a hair dye.  Dexamethasone injection administered in clinic.  Clobetasol " shampoo started.  May try cool compresses, aloe, antihistamines as needed for symptom relief.  Patient is advised to avoid scratching, irritants, hot showers, scented products. Risk, benefits, and potential side effects of medication(s) discussed with pt. Discussed disease/illness presentation, treatment options, progression, complications, and outcomes with patient. Pt. Has expressed understanding and is an agreement of plan of care.    Medical Decision Making      Orders and Diagnoses  There are no diagnoses linked to this encounter.    Medical Admin Record      Patient disposition: Home    Electronically signed by Laura Sheth PA-C  9:38 AM

## 2024-11-11 NOTE — PATIENT INSTRUCTIONS
Keep area clean and dry  Use clobetasol shampoo daily x max of 7 days  Avoid getting shampoo in eyes  Avoid scratching  Avoid hot showers as this can worsen itching  May use oral antihistamines such as Claritin or zyrtec for itching.

## 2024-11-12 ENCOUNTER — SPECIALTY PHARMACY (OUTPATIENT)
Dept: PHARMACY | Facility: CLINIC | Age: 54
End: 2024-11-12

## 2024-11-12 PROCEDURE — RXMED WILLOW AMBULATORY MEDICATION CHARGE

## 2024-11-13 ENCOUNTER — LAB (OUTPATIENT)
Dept: LAB | Facility: LAB | Age: 54
End: 2024-11-13
Payer: COMMERCIAL

## 2024-11-13 DIAGNOSIS — K50.919 CROHN'S DISEASE WITH COMPLICATION, UNSPECIFIED GASTROINTESTINAL TRACT LOCATION: ICD-10-CM

## 2024-11-13 LAB
ALBUMIN SERPL BCP-MCNC: 3.9 G/DL (ref 3.4–5)
ALP SERPL-CCNC: 107 U/L (ref 33–110)
ALT SERPL W P-5'-P-CCNC: 20 U/L (ref 7–45)
ANION GAP SERPL CALC-SCNC: 11 MMOL/L (ref 10–20)
AST SERPL W P-5'-P-CCNC: 15 U/L (ref 9–39)
BASOPHILS # BLD AUTO: 0.11 X10*3/UL (ref 0–0.1)
BASOPHILS NFR BLD AUTO: 0.9 %
BILIRUB SERPL-MCNC: 0.4 MG/DL (ref 0–1.2)
BUN SERPL-MCNC: 18 MG/DL (ref 6–23)
CALCIUM SERPL-MCNC: 9.6 MG/DL (ref 8.6–10.3)
CHLORIDE SERPL-SCNC: 106 MMOL/L (ref 98–107)
CO2 SERPL-SCNC: 28 MMOL/L (ref 21–32)
CREAT SERPL-MCNC: 0.87 MG/DL (ref 0.5–1.05)
CRP SERPL-MCNC: 0.79 MG/DL
EGFRCR SERPLBLD CKD-EPI 2021: 80 ML/MIN/1.73M*2
EOSINOPHIL # BLD AUTO: 0.86 X10*3/UL (ref 0–0.7)
EOSINOPHIL NFR BLD AUTO: 7.3 %
ERYTHROCYTE [DISTWIDTH] IN BLOOD BY AUTOMATED COUNT: 13.2 % (ref 11.5–14.5)
GLUCOSE SERPL-MCNC: 96 MG/DL (ref 74–99)
HCT VFR BLD AUTO: 42.1 % (ref 36–46)
HGB BLD-MCNC: 13.3 G/DL (ref 12–16)
IMM GRANULOCYTES # BLD AUTO: 0.04 X10*3/UL (ref 0–0.7)
IMM GRANULOCYTES NFR BLD AUTO: 0.3 % (ref 0–0.9)
LYMPHOCYTES # BLD AUTO: 6.65 X10*3/UL (ref 1.2–4.8)
LYMPHOCYTES NFR BLD AUTO: 56.2 %
MCH RBC QN AUTO: 28.9 PG (ref 26–34)
MCHC RBC AUTO-ENTMCNC: 31.6 G/DL (ref 32–36)
MCV RBC AUTO: 92 FL (ref 80–100)
MONOCYTES # BLD AUTO: 0.7 X10*3/UL (ref 0.1–1)
MONOCYTES NFR BLD AUTO: 5.9 %
NEUTROPHILS # BLD AUTO: 3.47 X10*3/UL (ref 1.2–7.7)
NEUTROPHILS NFR BLD AUTO: 29.4 %
NRBC BLD-RTO: 0 /100 WBCS (ref 0–0)
PLATELET # BLD AUTO: 341 X10*3/UL (ref 150–450)
POTASSIUM SERPL-SCNC: 4.4 MMOL/L (ref 3.5–5.3)
PROT SERPL-MCNC: 6.7 G/DL (ref 6.4–8.2)
RBC # BLD AUTO: 4.6 X10*6/UL (ref 4–5.2)
SODIUM SERPL-SCNC: 141 MMOL/L (ref 136–145)
WBC # BLD AUTO: 11.8 X10*3/UL (ref 4.4–11.3)

## 2024-11-13 PROCEDURE — 83520 IMMUNOASSAY QUANT NOS NONAB: CPT

## 2024-11-13 PROCEDURE — 88350 IMFLUOR EA ADDL 1ANTB STN PX: CPT

## 2024-11-13 PROCEDURE — 82397 CHEMILUMINESCENT ASSAY: CPT

## 2024-11-13 PROCEDURE — 80053 COMPREHEN METABOLIC PANEL: CPT

## 2024-11-13 PROCEDURE — 81479 UNLISTED MOLECULAR PATHOLOGY: CPT

## 2024-11-13 PROCEDURE — 86140 C-REACTIVE PROTEIN: CPT

## 2024-11-13 PROCEDURE — 83993 ASSAY FOR CALPROTECTIN FECAL: CPT

## 2024-11-13 PROCEDURE — 36415 COLL VENOUS BLD VENIPUNCTURE: CPT

## 2024-11-13 PROCEDURE — 88346 IMFLUOR 1ST 1ANTB STAIN PX: CPT

## 2024-11-13 PROCEDURE — 85025 COMPLETE CBC W/AUTO DIFF WBC: CPT

## 2024-11-14 ENCOUNTER — OFFICE VISIT (OUTPATIENT)
Dept: ORTHOPEDIC SURGERY | Facility: CLINIC | Age: 54
End: 2024-11-14
Payer: COMMERCIAL

## 2024-11-14 ENCOUNTER — HOSPITAL ENCOUNTER (OUTPATIENT)
Dept: RADIOLOGY | Facility: CLINIC | Age: 54
Discharge: HOME | End: 2024-11-14
Payer: COMMERCIAL

## 2024-11-14 ENCOUNTER — TREATMENT (OUTPATIENT)
Dept: OCCUPATIONAL THERAPY | Facility: CLINIC | Age: 54
End: 2024-11-14
Payer: COMMERCIAL

## 2024-11-14 DIAGNOSIS — M18.9 OSTEOARTHRITIS OF CARPOMETACARPAL (CMC) JOINT OF THUMB, UNSPECIFIED LATERALITY, UNSPECIFIED OSTEOARTHRITIS TYPE: ICD-10-CM

## 2024-11-14 DIAGNOSIS — M25.60 JOINT STIFFNESS: Primary | ICD-10-CM

## 2024-11-14 DIAGNOSIS — M79.645 PAIN OF FINGER OF LEFT HAND: ICD-10-CM

## 2024-11-14 DIAGNOSIS — M25.59 PAIN IN OTHER JOINT: Primary | ICD-10-CM

## 2024-11-14 DIAGNOSIS — S66.819A STRAIN OF FLEXOR DIGITORUM PROFUNDUS TENDON: ICD-10-CM

## 2024-11-14 DIAGNOSIS — M65.242 CALCIFIC TENDINITIS OF LEFT HAND: Primary | ICD-10-CM

## 2024-11-14 DIAGNOSIS — G56.02 LEFT CARPAL TUNNEL SYNDROME: ICD-10-CM

## 2024-11-14 PROCEDURE — 99214 OFFICE O/P EST MOD 30 MIN: CPT | Performed by: ORTHOPAEDIC SURGERY

## 2024-11-14 PROCEDURE — 73140 X-RAY EXAM OF FINGER(S): CPT | Mod: LT

## 2024-11-14 PROCEDURE — 97110 THERAPEUTIC EXERCISES: CPT | Mod: GO

## 2024-11-14 PROCEDURE — 97140 MANUAL THERAPY 1/> REGIONS: CPT | Mod: GO

## 2024-11-14 NOTE — PROGRESS NOTES
11/14/2024    Chief Complaint   Patient presents with    Left Hand - Follow-up, Pain     Lt thumb cmc arthroplasty  Lt index finger flexor digitorum profundus calcific tendonitis  Lt CTR  DOS 8/2/24  Xray today       History of Present Illness:  Patient Elena Michelle , 54 y.o. female, presents today, 11/14/2024, for evaluation of left hand pain, swelling, and stiffness .  She is 3 and half months out from left thumb CMC arthroplasty.  In regards to her range of motion of the thumb she feels that is doing well, however her postoperative course has been substantially complicated by development of calcific tendinitis to the left index finger as well as profound stiffness and discomfort with this.  She is taking NSAID she is working with therapy on motion recovery for this.  She states that the finger is swollen and painful at all times and she has great functional difficulty associated with this.  She also endorses new onset of pain and stiffness to the left long finger feeling the same symptoms.       Review of Systems:   GENERAL: Negative  GI: Negative  MUSCULOSKELETAL: See HPI  SKIN: Negative  NEURO:  Negative     Physical Exam:  GENERAL:  Alert and oriented to person, place, and time.  No acute distress and breathing comfortably; pleasant and cooperative with the examination.  HEENT:  Head is normocephalic and atraumatic.  NECK:  Supple, no visible swelling.  CARDIOVASCULAR:  No palpable tachycardia.  LUNGS:  No audible wheezing or labored breathing.  ABDOMEN:  Nondistended.  Extremities: Evaluation of left upper extremity finds the patient to have a palpable radial artery at the wrist with brisk capillary refill to all digits. The patient has intact sensorium to axillary, radial, median and ulnar nerves. There are no open wounds. There are no signs of infection. There is no evidence of lymphedema or lymphatic streaking. The patient has supple compartments of the left arm, forearm and hand. Surgical incisions  are well-healed.  She can flex the thumb level A1 pulley of the left small finger.  However she has substantial stiffness through range of motion of flexion of the left index and long finger.  She is diffusely tender especially over A1 pulleys.     Imaging/Test Results:  3 views of the thumb show no acute fracture or dislocation.  Good alignment all planes.  Surgical resection trapezium is noted.  No evidence of metacarpal subsidence good spacing maintained throughout.     Assessment:  Left thumb CMC arthroplasty, 2 and half months postop.  Left index and long finger calcific tendinitis with substantial stiffness and discomfort.     Plan:  Treatment options were reviewed.  Recommend for continued formal therapy protocols bracing and NSAID use.  Will follow-up in 3 months for repeat clinical and radiographic exam, x-rays 3 views of left index and long finger upon return.  Will give her a note to return to work on Monday without restrictions.  Functionally, she feels she is not able to perform her activities required in the workplace, but feels she has no choice but to return.  All questions answered at today's visit.    In a face to face encounter, I performed a history and physical examination, discussed pertinent diagnostic studies if indicated, and discussed diagnosis and management strategies with both the patient and the mid-level provider. I reviewed the mid-level's note and agree with the documented findings and plan of care.  Patient presents today for ongoing evaluation of the left upper extremity.  She status post left thumb CMC arthroplasty.  Her pain is relenting and that part is coming along slowly.  She describes persistence of pain to the left index finger.  She has diagnosis of left index finger calcific tendinitis.  We are trying intermittent splinting motion preservation exercises and NSAIDs but she appears with symptoms unchanged as to the previous exam.  Now the left long finger is painful similar  as is the left index but there is no radiographic finding of mineralization about the flexor tendon as it is seen to the left index finger.  She understands that it is likely that over time the calcific tendinitis will relent and it is a bit difficult to predict as to what rate.  Regarding the thumb she is okay for activities to tolerance.  Unfortunately she has been called back to work before she is ready.  We have cleared her for activities of tolerance but she still lacks endurance strength and still has pain with motion.  She will follow-up with me in 3 months for x-rays of the left index finger and x-rays of the left long finger.  Patient is agreeable with this strategy.

## 2024-11-14 NOTE — LETTER
November 14, 2024     Patient: Elena Michelle   YOB: 1970   Date of Visit: 11/14/2024       To Whom It May Concern:    It is my medical opinion that Elena Michelle may return to work on 11/18/2024 with no restrictions .    If you have any questions or concerns, please don't hesitate to call., [phone]         Sincerely,        Sen Perez, DO

## 2024-11-14 NOTE — PROGRESS NOTES
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 She states all her fingers are stiff today.  Prior level of function: independent  Current level of function: Assist needed due to reports of high levels of pain. 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  Pain: 4/10; 6/10 index, thumb 4/10 with motion,  pain description: sharp and dull and Location: left thenar and left index finger  Chief Complaint:Pain  Patient's goal for therapy:Return to work  Patient's preferred learning style: visual, auditory, read/written, kinesthetic  Outcome Measure:  Initial evaluation Quick DASH 75%,  Re-evaluation Quick DASH 75.00%    Objective  Observation: Patient to therapy with  index finger orthosis on.  Clinical presentation: stable  Skin/ Wound/Scar: sutures out, surgical scars tender and moderately adhered  Edema: moderate left thumb and index, dorsum of hand, uhnchanged  (Left  index  P3 6.3 PIP 6.0  P2 5.2  P1 4.7 )  Sensation: Tingling/numbness left thumb   Dexterity/ Coordination: unable to complete fine motor activities    Upper Extremity ROM   Elbow extension/flexion: full  Forearm supination/pronation: full  Wrist  extension/flexion:  75*/50*      Radial deviation/ulnar deviation:   25/30      Left Hand ROM  AROM   THUMB  A/PROM    Tracy 7*/ 8    Palmar Abduction 65*    Radial Abduction 65*   Left thumb MP 0/45  IP 0/40*    Finger (DPC)        Index Middle Ring Small   cm 5.0 DPC DPC DPC   Full extension of digits noted                                          Left index AROM  MP 0/70    PIP   0/80  DIP   0/30 after heat and stretch                                       PROM   MP 0/90  PIP 0/82 DIP 0/42    Hand Strength-NT                Gross grasp(dynamometer) (lbs)                             (2nd setting) Right         Left                Pinch (lbs)                             Key  right  left                            [place]   right     left    ADLS/IADLS: Difficult as patient is using her non-dominant right hand to complete.      Treatment Completed this Date: Patient received fluidotherapy and completed ROM exercises for digits.. Soft tissue mobilization completed to multiple soft tissue restrictions noted in index and palm.Reviewed home program.    HEP   see treatment    Assessment  Patient is a 54 y/o left hand dominate  female known to stated therapist from previous OT intervention for her right thumb and hand. Patient diagnosed with left CMC osteoarthritis and carpal tunnel and is 13 weeks 4 days post-operative left thumb CMC arthroplasty and left carpal tunnel release on 8/2/2024. Patient demonstrates some difficulty with work simulation tasks including  stacking chips.Patient will continue to  benefit from attending occupational therapy to improve functional use of her left  hand.    Plan of Care Goals to be achieved by 9 weeks    Patient's level of independence with ADLs/ IADLs will improve by at least 50% per the quick DASH by discharge.    Patient will demonstrate full wrist ROM to complete ADLs/IADLs independently by discharge.PM    Patient will demonstrate full thumb ROM  and full composite flexion of  digits to improve participation in ADLs/IADLs by discharge.PM    Patient will report understanding of home program, demonstrate independence and verbalize precautions.PM    Patient will report follow through with wearing of orthosis as instructed by therapist.M  Patient will report follow through with wearing of orthosis as instructed by therapist. PM    Patient will report pain free use of hand for completing ADLs/IADLS by discharge.PM    Patient's scar will be supple and demonstrate good healing that does not limit function by discharge. M      Intervention    Therapeutic exercises, Therapeutic activity, manual therapy, orthosis, fluidotherapy, paraffin, taping, patient education, home program    Rehabilitation Potential:    good    Potential limiting factors for rehabilitation: pain    Plan  Frequency 1-2/week ( additional 6 weeks 1/week)  Duration 5 weeks    Patient and therapist developed goals for therapy and patient verbalizing agreement to plan of care

## 2024-11-15 LAB — CALPROTECTIN STL-MCNT: <5 UG/G

## 2024-11-20 ENCOUNTER — PHARMACY VISIT (OUTPATIENT)
Dept: PHARMACY | Facility: CLINIC | Age: 54
End: 2024-11-20
Payer: COMMERCIAL

## 2024-11-21 ENCOUNTER — SPECIALTY PHARMACY (OUTPATIENT)
Dept: PHARMACY | Facility: CLINIC | Age: 54
End: 2024-11-21

## 2024-11-21 LAB — SCAN RESULT: NORMAL

## 2024-11-21 NOTE — PROGRESS NOTES
[facility] Clinical Note  Initial Patient Education     Introduction  Elena Michelle is a 54 y.o. female who is on the specialty pharmacy service for management of: Gastroenterology Core.    Elena Michelle is initiating the following therapy: skyrizi 360 mg once every 8 weeks    Medication receipt date: 11/21/2024 (expected)  Duration of therapy: Maintenance    The most recent encounter visit with the referring prescriber Dr. Parnell on 11/8/24 was reviewed.  Pharmacy will continue to collaborate in the care of this patient with the referring prescriber.    Clinical Background  An initial assessment was conducted prior to first fill of the medication to determine the appropriateness of therapy given the patient's diagnosis, medication list, comorbidities, allergies, medical history, patient's ability to self administer medication, and therapeutic goals based on possible outcomes of therapy. Refer to initial assessment task completed on 11/13/2024.    Labs for clinical appropriateness that were reviewed include:   Gastroenterology - CBC-diff:   Lab Results   Component Value Date    WBC 11.8 (H) 11/13/2024    RBC 4.60 11/13/2024    HGB 13.3 11/13/2024    HCT 42.1 11/13/2024    MCV 92 11/13/2024    MCHC 31.6 (L) 11/13/2024     11/13/2024    RDW 13.2 11/13/2024    NEUTOPHILPCT 29.4 11/13/2024    IGPCT 0.3 11/13/2024    LYMPHOPCT 56.2 11/13/2024    MONOPCT 5.9 11/13/2024    EOSPCT 7.3 11/13/2024    BASOPCT 0.9 11/13/2024    NEUTROABS 3.47 11/13/2024    LYMPHSABS 6.65 (H) 11/13/2024    MONOSABS 0.70 11/13/2024    EOSABS 0.86 (H) 11/13/2024    BASOSABS 0.11 (H) 11/13/2024   , CMP:   Lab Results   Component Value Date    GLUCOSE 96 11/13/2024     11/13/2024    K 4.4 11/13/2024     11/13/2024    CO2 28 11/13/2024    ANIONGAP 11 11/13/2024    BUN 18 11/13/2024    CREATININE 0.87 11/13/2024    GFRF >90 09/27/2023    CALCIUM 9.6 11/13/2024    ALBUMIN 3.9 11/13/2024    ALKPHOS 107  11/13/2024    PROT 6.7 11/13/2024    AST 15 11/13/2024    BILITOT 0.4 11/13/2024    ALT 20 11/13/2024   , TB:   Lab Results   Component Value Date    TBSIN Negative 07/29/2024   , Hepatitis B panel:   Lab Results   Component Value Date    HEPBCAB Nonreactive 10/09/2023    HEPBCIGM Nonreactive 07/29/2024    HEPBSAG Nonreactive 07/29/2024    HEPBSAB <3.1 10/09/2023   , Hepatitis C antibody:   Lab Results   Component Value Date    HEPCAB Nonreactive 07/29/2024   , and LFTs and bilirubin:   Lab Results   Component Value Date    ALT 20 11/13/2024    AST 15 11/13/2024    ALKPHOS 107 11/13/2024    BILITOT 0.4 11/13/2024       Education/Discussion  Elena was contacted on 11/21/2024 at 2:35 PM for a pharmacy visit with encounter number [phone] from:   [facility]  [address] [place] [id number]  Dept: [phone]  Dept Fax: [phone]  Elena consented to a/an Telephone visit, which was performed.    Medication Start Date (planned or actual): 12/4 - will have appointment with nurse ambassador for injection training  Education was conducted prior to start of therapy? Yes    Education discussed includes the following:  Patient Education  Counseled the Patient on the Following : Expected duration of therapy, Doses and administration, Adherence and missed doses, Possible side effects and management, Lab monitoring and follow-up, Safe handling, storage, and disposal, Pharmacy contact information  Learner: Patient  Education Method: Explanation  Education Response: Verbalizes understanding  Additional details of the medication specific counseling are found within the linked patient education flowsheet.     The follow up timeline was discussed. Every person responds to and reacts to therapy differently. Patient should be assessed for efficacy and tolerability in approximately: 3 months    Provided education on goals and possible outcomes of therapy:  Adherence with therapy  Timely  completion of appropriate labs  Timely and appropriate follow up with provider  Identify and address medication interactions with presciption medications, OTC medications and supplements  Optimize or maintain quality of life  Gastroenterology: Improve gastrointestinal clinical symptoms such as abdominal pain, inflammation, diarrhea, constipation, and bleeding, Reduce frequency and urgency of bowel movements, and Allow for GI mucosal healing (observed through endoscopy and colonoscopy)     The importance of adherence was discussed and they were advised to take the medication as prescribed by their provider.     Impression/Plan  Review and Assessment   Reviewed During This Encounter: Medications  Medications Assessed for Appropriate Use, Dose, Route, Frequency, and Duration: Yes  Medication Reconciliation Completed: Yes  Drug Interactions Evaluated: Yes  Clinically Relevant Drug Interactions Identified: No    This patient has not been identified as high risk due to Lack of high risk qualifiers.  The following action was taken: N/A.    QOL/Patient Satisfaction  Rate your quality of life on scale of 1-10:  (Deferred, patient eager to get off phone call)  Rate your satisfaction with  Specialty Pharmacy on scale of 1-10:  (Deferred, patient eager to get off phone call)    Provided contact information ([phone]) for [facility] and reviewed dispensing process, refill timeline and patient management follow up. Advised to contact the pharmacy if there are any adverse effects and/or changes to medication list, including prescriptions, OTC medications, herbal products, or supplements. Confirmed understanding of education conducted during assessment. All questions and concerns were addressed and patient was encouraged to reach out for additional questions or concerns.    Farheen Penny, PharmD

## 2024-11-27 ENCOUNTER — APPOINTMENT (OUTPATIENT)
Dept: OCCUPATIONAL THERAPY | Facility: CLINIC | Age: 54
End: 2024-11-27
Payer: COMMERCIAL

## 2024-12-03 ENCOUNTER — TREATMENT (OUTPATIENT)
Dept: OCCUPATIONAL THERAPY | Facility: CLINIC | Age: 54
End: 2024-12-03
Payer: COMMERCIAL

## 2024-12-03 DIAGNOSIS — M25.60 JOINT STIFFNESS: Primary | ICD-10-CM

## 2024-12-03 PROCEDURE — 97140 MANUAL THERAPY 1/> REGIONS: CPT | Mod: GO

## 2024-12-03 PROCEDURE — 97110 THERAPEUTIC EXERCISES: CPT | Mod: GO

## 2024-12-03 NOTE — PROGRESS NOTES
Occupational Therapy Upper Extremity Progress Note    Date: 12/3/2024    Time In: 800  Time Out:845  Total Time:  45 minutes    Charges:  HC OT MANUAL THER TECH,1+REGIONS,EA 15 MIN   1 unit  HC OT THERAPEUTIC EXERCISES  72656 (CPT®) 23 minutes, 2 units      NAME: Elena Michelle  : 1970  MRN: [id number]    Chart reviewed: yes    Referring Physician: Dr Perez  for: orthosis, ROM, edema and scar management  Diagnosis:  Left CMC osteoarthritis and left carpal tunnel; calcific tendinitis  Date of onset  8/15/2024   Surgery 2024                ( 18 weeks and 3 days post-surgery)  Precautions: NWB    Insurance  [employer]  Visit Number: 15  Visits Allowed:23  Authorization: not needed  Date Range: n/a    Subjective  Patient states she went back to  work .  Prior level of function: independent  Current level of function: Assist needed due to reports of high levels of pain. 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  Pain: 4/10 index, thumb 4/10 with motion,  pain description: sharp and dull and Location: left thenar and left index finger  Chief Complaint:Pain  Patient's goal for therapy:Return to work  Patient's preferred learning style: visual, auditory, read/written, kinesthetic  Outcome Measure:  Initial evaluation Quick DASH 75%,  Re-evaluation Quick DASH 75.00%    Objective  Observation: Patient to therapy with  index finger orthosis on.  Clinical presentation: stable  Skin/ Wound/Scar: sutures out, surgical scars tender and moderately adhered  Edema: moderate left thumb and index, dorsum of hand, uhnchanged  (Left  index  P3 6.5 PIP 6.2 P2 5.25 P1 5.5 )  Sensation: Tingling/numbness left thumb   Dexterity/ Coordination: unable to complete fine motor activities    Upper Extremity ROM   Elbow extension/flexion: full  Forearm supination/pronation: full  Wrist extension/flexion:  75*/50*      Radial deviation/ulnar deviation:   25/30      Left Hand ROM  AROM   THUMB  A/PROM    Tracy  6/ 8    Palmar Abduction 65    Radial Abduction 70*   Left thumb MP 0/45  IP 0/40*    Finger (DPC)        Index Middle Ring Small   cm 6.0 DPC DPC DPC   Full extension of digits noted                                          Left index AROM  MP 0/56    PIP 8/62  DIP   8/32                                       PROM   MP 0/90  PIP 0/82 DIP 0/42    Hand Strength-NT                Gross grasp(dynamometer) (lbs)                             (2nd setting) Right         Left                Pinch (lbs)                             Key  right  left                            [place]   right     left    ADLS/IADLS: Difficult as patient is using her non-dominant right hand to complete.      Treatment Completed this Date:  Measured ROM and discussed findings with patient.Patient received fluidotherapy and completed ROM exercises for digits. Completed gentle PROM and intrinsic stretches. Soft tissue mobilization completed to multiple soft tissue restrictions noted in index and palm. She completed gentle gripping with light theratube followed by ice roller.Reviewed home program.    HEP   see treatment    Assessment  Patient is a 54 y/o left hand dominate  female known to stated therapist from previous OT intervention for her right thumb and hand. Patient diagnosed with left CMC osteoarthritis and carpal tunnel and is 18 weeks 3 days post-operative left thumb CMC arthroplasty and left carpal tunnel release on 8/2/2024. Patient demonstrates decreased ROM this dates, except thumb radial abduction improved.Patient will continue to  benefit from attending occupational therapy to improve functional use of her left  hand.    Plan of Care Goals to be achieved by 9 weeks    Patient's level of independence with ADLs/ IADLs will improve by at least 50% per the quick DASH by discharge.    Patient will demonstrate full wrist ROM to complete ADLs/IADLs independently by discharge.PM    Patient will demonstrate full thumb ROM  and full composite  flexion of digits to improve participation in ADLs/IADLs by discharge.PM    Patient will report understanding of home program, demonstrate independence and verbalize precautions.PM    Patient will report follow through with wearing of orthosis as instructed by therapist.M  Patient will report follow through with wearing of orthosis as instructed by therapist. PM    Patient will report pain free use of hand for completing ADLs/IADLS by discharge.PM    Patient's scar will be supple and demonstrate good healing that does not limit function by discharge. M      Intervention    Therapeutic exercises, Therapeutic activity, manual therapy, orthosis, fluidotherapy, paraffin, taping, patient education, home program    Rehabilitation Potential:    good    Potential limiting factors for rehabilitation: pain    Plan  Frequency 1-2/week ( additional 6 weeks 1/week)  Duration 5 weeks    Patient and therapist developed goals for therapy and patient verbalizing agreement to plan of care

## 2024-12-04 ENCOUNTER — APPOINTMENT (OUTPATIENT)
Dept: RADIOLOGY | Facility: CLINIC | Age: 54
End: 2024-12-04
Payer: COMMERCIAL

## 2024-12-11 ENCOUNTER — APPOINTMENT (OUTPATIENT)
Dept: PRIMARY CARE | Facility: CLINIC | Age: 54
End: 2024-12-11
Payer: COMMERCIAL

## 2024-12-11 VITALS
SYSTOLIC BLOOD PRESSURE: 112 MMHG | WEIGHT: 172.3 LBS | BODY MASS INDEX: 32.56 KG/M2 | TEMPERATURE: 96.6 F | DIASTOLIC BLOOD PRESSURE: 81 MMHG | OXYGEN SATURATION: 96 % | HEART RATE: 75 BPM | RESPIRATION RATE: 18 BRPM

## 2024-12-11 DIAGNOSIS — K21.9 GASTROESOPHAGEAL REFLUX DISEASE WITHOUT ESOPHAGITIS: ICD-10-CM

## 2024-12-11 DIAGNOSIS — J45.20 MILD INTERMITTENT ASTHMA WITHOUT COMPLICATION (HHS-HCC): ICD-10-CM

## 2024-12-11 DIAGNOSIS — J33.9 NASAL POLYP: ICD-10-CM

## 2024-12-11 DIAGNOSIS — M65.20 CALCIFYING TENDINITIS: ICD-10-CM

## 2024-12-11 DIAGNOSIS — K50.80 CROHN'S DISEASE OF BOTH SMALL AND LARGE INTESTINE WITHOUT COMPLICATION (MULTI): Primary | ICD-10-CM

## 2024-12-11 PROBLEM — R41.3 AMNESIA: Status: ACTIVE | Noted: 2024-12-11

## 2024-12-11 PROBLEM — G56.02 CARPAL TUNNEL SYNDROME, LEFT UPPER LIMB: Status: RESOLVED | Noted: 2024-07-09 | Resolved: 2024-12-11

## 2024-12-11 PROBLEM — M18.11 UNILATERAL PRIMARY OSTEOARTHRITIS OF FIRST CARPOMETACARPAL JOINT, RIGHT HAND: Status: RESOLVED | Noted: 2024-03-04 | Resolved: 2024-12-11

## 2024-12-11 PROBLEM — R41.3 AMNESIA: Status: RESOLVED | Noted: 2024-12-11 | Resolved: 2024-12-11

## 2024-12-11 PROBLEM — Z01.818 PRE-OP EXAM: Status: RESOLVED | Noted: 2024-03-04 | Resolved: 2024-12-11

## 2024-12-11 PROBLEM — J34.89 RHINORRHEA: Status: RESOLVED | Noted: 2023-03-07 | Resolved: 2024-12-11

## 2024-12-11 PROBLEM — G56.01 CARPAL TUNNEL SYNDROME, RIGHT UPPER LIMB: Status: RESOLVED | Noted: 2024-03-04 | Resolved: 2024-12-11

## 2024-12-11 PROBLEM — J06.9 ACUTE URI: Status: RESOLVED | Noted: 2023-12-20 | Resolved: 2024-12-11

## 2024-12-11 PROBLEM — J02.9 SORE THROAT: Status: RESOLVED | Noted: 2023-03-07 | Resolved: 2024-12-11

## 2024-12-11 PROBLEM — K76.0 STEATOSIS OF LIVER: Status: ACTIVE | Noted: 2024-12-11

## 2024-12-11 PROBLEM — M25.569 KNEE PAIN: Status: ACTIVE | Noted: 2024-12-11

## 2024-12-11 PROBLEM — H66.001 NON-RECURRENT ACUTE SUPPURATIVE OTITIS MEDIA OF RIGHT EAR WITHOUT SPONTANEOUS RUPTURE OF TYMPANIC MEMBRANE: Status: RESOLVED | Noted: 2024-06-10 | Resolved: 2024-12-11

## 2024-12-11 PROBLEM — M18.12 UNILATERAL PRIMARY OSTEOARTHRITIS OF FIRST CARPOMETACARPAL JOINT, LEFT HAND: Status: RESOLVED | Noted: 2024-07-09 | Resolved: 2024-12-11

## 2024-12-11 PROBLEM — B34.9 VIRAL SYNDROME: Status: RESOLVED | Noted: 2023-03-07 | Resolved: 2024-12-11

## 2024-12-11 PROCEDURE — 99214 OFFICE O/P EST MOD 30 MIN: CPT | Performed by: FAMILY MEDICINE

## 2024-12-11 RX ORDER — OMEPRAZOLE 40 MG/1
40 CAPSULE, DELAYED RELEASE ORAL DAILY
Qty: 30 CAPSULE | Refills: 3 | Status: SHIPPED | OUTPATIENT
Start: 2024-12-11 | End: 2025-12-11

## 2024-12-11 ASSESSMENT — PATIENT HEALTH QUESTIONNAIRE - PHQ9
SUM OF ALL RESPONSES TO PHQ9 QUESTIONS 1 AND 2: 0
1. LITTLE INTEREST OR PLEASURE IN DOING THINGS: NOT AT ALL
2. FEELING DOWN, DEPRESSED OR HOPELESS: NOT AT ALL

## 2024-12-11 ASSESSMENT — PAIN SCALES - GENERAL: PAINLEVEL_OUTOF10: 4

## 2024-12-11 NOTE — PROGRESS NOTES
Subjective   Patient ID: Elena Micehlle is a 54 y.o. female who presents for Follow-up (6 Month).    [facility] follow up    Crohn's disease: follows with . on [facility]. Flare ups from certain foods. On low fiber,gluten free and lactose free diet.  2. Chronic posterior nasal polyposis : follows with ENT q 6 months.  3. Left hand deformity-severe restricted movement at index finger and thumb. Limited lefthand function is effecting her work: is  at [employer]. Working with OT to improve function.  4. No problems with mood. Forgets: items to buy when goes on shopping, short term memory loss-  often comments on her memory loss. Memory and cognition has not effected her safety or work, relations.    Consultants : GI,Dermatology ( recommended cancer screening from [facility]),Ortho,OT,Rheumatology.    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    Left hand dominant  Severe restricted flextioon at DIP   Review of Systems   All other systems reviewed and are negative.      Objective   /81 (BP Location: Right arm, Patient Position: Sitting, BP Cuff Size: Adult)   Pulse 75   Temp 35.9 °C (96.6 °F) (Temporal)   Resp 18   Wt 78.2 kg (172 lb 4.8 oz)   SpO2 96%   BMI 32.56 kg/m²     Physical Exam  Vitals and nursing note reviewed.   Cardiovascular:      Rate and Rhythm: Normal rate and regular rhythm.   Pulmonary:      Effort: Pulmonary effort is normal.      Breath sounds: Normal breath sounds.   Abdominal:      Palpations: Abdomen is soft.      Tenderness: There is no abdominal tenderness.   Musculoskeletal:      Cervical back: Neck supple.   Lymphadenopathy:      Cervical: No cervical adenopathy.   Neurological:      Mental Status: She is alert.   Psychiatric:         Mood and Affect: Mood normal.         Behavior: Behavior normal.         Thought Content: Thought content normal.          Judgment: Judgment normal.         Assessment/Plan   Diagnoses and all orders for this visit:  Crohn's disease of both small and large intestine without complication (Multi)  Gastroesophageal reflux disease without esophagitis  -     omeprazole (PriLOSEC) 40 mg DR capsule; Take 1 capsule (40 mg) by mouth once daily. Do not crush or chew.  Mild intermittent asthma without complication (HHS-HCC)  Nasal polyp  Other orders  -     Follow Up In Primary Care - Established  -     Follow Up In Primary Care - Established; Future    Baylee is pleasant 54 year old female with Crohn's disease in partial remission, on Skyrizi, follows with GI.  Her main concern is left hand limited function: effecting her occupation (  at [employer]) and her passion ( ). She is working with OT and on home exercise regimen. Scheduled with rheumatology to r/o autoimmune cause.  Memory loss has effected her cognition and not interfering with safety.  I added B12 and Vit.D to her panel of blood work.  Mammogram ordered on 6/12: patient is going to schedule.    Counseled on healthy diet, recommended screening for being on Skyrizi, follow up with ENT.

## 2024-12-16 ENCOUNTER — APPOINTMENT (OUTPATIENT)
Dept: RHEUMATOLOGY | Facility: CLINIC | Age: 54
End: 2024-12-16
Payer: COMMERCIAL

## 2024-12-16 VITALS
BODY MASS INDEX: 32.62 KG/M2 | DIASTOLIC BLOOD PRESSURE: 76 MMHG | TEMPERATURE: 97.2 F | HEIGHT: 61 IN | WEIGHT: 172.8 LBS | SYSTOLIC BLOOD PRESSURE: 107 MMHG | HEART RATE: 76 BPM

## 2024-12-16 DIAGNOSIS — M25.59 PAIN IN OTHER JOINT: ICD-10-CM

## 2024-12-16 PROCEDURE — 3008F BODY MASS INDEX DOCD: CPT | Performed by: STUDENT IN AN ORGANIZED HEALTH CARE EDUCATION/TRAINING PROGRAM

## 2024-12-16 PROCEDURE — 99215 OFFICE O/P EST HI 40 MIN: CPT | Performed by: STUDENT IN AN ORGANIZED HEALTH CARE EDUCATION/TRAINING PROGRAM

## 2024-12-16 NOTE — PROGRESS NOTES
Rheumatology New Outpatient  Note    Subjective   Elena Michelle is a 54 y.o. female presenting today for Joint Pain.    History of Presenting Problem:   Elena with PMHx of Crohn's disease, fatty liver, left carpal tunnel syndrome, Asthma,  is presenting today as a NP for hand pain    She pain and stiffness in her bilateral wrist area x 2 years. She had numbness and tingling in her fingertip. She works as a a  and reports she is constantly dropping things from her hands .She saw orthopedic and had an EMG which was negative. She had steroid injection and it did not help. She tried to wear a hand brace and it did not help. She was seen by Dr. Kemp in 1/2024 and ruled out rheumatoid/inflammatory arthritis and recommended neurology referral. She had bilateral CTS surgery and bilateral CMC arthroplasty in 2024 (March and August). The numbness and pain in CMCs improved. However, she started having stiffness, inability to bed her left index since then. She was seen by the surgeon and said its not related to the surgery. She was found to have calcific tendinitis.   She has chronic knee pain but no swelling. No other joint pain or swelling. She denies hair loss, malar rash, photosensitivity, skin rashes, dry eyes, dry mouth, recurrent mouth sores, sudden vision loss, sudden hearing loss, seizures, inflammatory eye disease, h/o blood clots, cold sensitivity in fingers, vasospastic color changes in fingers when exposed to extreme temperatures and muscle weakness.    She has Crohns disease and just started Skyrizi recently.      Past Medical History:   Past Medical History:   Diagnosis Date    Arthritis     Aspirin sensitivity     BMI 31.0-31.9,adult     Carpal tunnel syndrome     Chronic diarrhea     Chronic ethmoidal sinusitis     Chronic maxillary sinusitis     COVID-19     NOT VACCINATED    Crohn's colitis (Multi)     Crohn's colitis (Multi)     Decreased sense of smell     Fatty liver     GERD (gastroesophageal  reflux disease)     Ileitis     Memory loss     Nasal polyp     Pain in left foot 07/01/2021    Left foot pain    Pain in right foot 07/01/2021    Right foot pain    Plantar fasciitis, bilateral     Post-nasal drip     Rhinorrhea     Wears contact lenses     Wears dentures     Wears glasses        Allergies:   Allergies   Allergen Reactions    Hydrocodone-Acetaminophen Itching     Vicodin    Ibuprofen Other     WATERY EYES, NASAL CONGESTION, PUFFY EYES    Naproxen Sodium Other     WATERY EYES, NASAL CONGESTION, PUFFY EYES    Nsaids (Non-Steroidal Anti-Inflammatory Drug) Other     WATERY EYES, NASAL CONGESTION, PUFFY EYES    Propoxyphene-Acetaminophen Swelling and Angioedema       Medications:   Current Outpatient Medications:     acetaminophen (Tylenol) 500 mg tablet, Take 1 tablet (500 mg) by mouth every 8 hours if needed for pain., Disp: , Rfl:     omeprazole (PriLOSEC) 40 mg DR capsule, Take 1 capsule (40 mg) by mouth once daily. Do not crush or chew., Disp: 30 capsule, Rfl: 3    risankizumab-rzaa (Skyrizi) 360 mg/2.4 mL (150 mg/mL) wearable injector injection, Inject 2.4 mL (360 mg) under the skin every 8 (eight) weeks starting at week 12 as directed per provider., Disp: 2.4 mL, Rfl: 3    Review of Systems:   Constitutional: Denies fever, chills   Eyes: Denies dry eyes, pain in the eyes   ENT: Denies dry mouth, loss of taste, sores in the mouth  Cardiovascular: Denies chest pain, palpitations   Respiratory: Denies shortness of breath   Gastrointestinal: Denies heartburn   Integumentary: Denies photosensitivity, rash or lesions, Raynaud's   Neurological: Denies any numbness or tingling    MSK: As per HPI.     All 10 review of systems have been reviewed and are negative for complaint except as noted in the HPI    Objective   Physical Examination:  Vitals:    12/16/24 1040   BP: 107/76   Pulse: 76   Temp: 36.2 °C (97.2 °F)     Growth %ile SmartLinks can only be used for patients less than 20 years old.  Ht Readings  "from Last 1 Encounters:   24 1.549 m (5' 1\")     Wt Readings from Last 1 Encounters:   24 78.4 kg (172 lb 12.8 oz)       General - NAD, sitting up in chair, well-groomed, pleasant, AAOx3  Head: Normocephalic, atraumatic  Eyes - PERRLA, EOMI. No conjunctiva injection.   Cardiovascular - Normal S1, S2. Regular rate and rhythm. No murmurs or rubs.  Lungs - Symmetric chest expansion. Clear to auscultation bilaterally.   Skin - No rashes or ulcers. Skin warm and dry. No erythema on bilateral cheeks.  Extremities - No edema, cyanosis ,or clubbing  Neurological - Alert and oriented x 3,  grossly intact. No focal deficit.  Musculoskeletal -  Shoulders: Full ROM, without pain, no swelling, warmth or tenderness.  Elbows: Full ROM, without pain, no swelling, warmth or tenderness.  Wrists: Full ROM, without pain, no swelling, warmth or tenderness.  MCP: No swelling, warmth or tenderness. Metacarpal squeeze negative  PIP: left 2nd PIP tenderness/ Whole finger tenderness  DIP: left 2nd DIP tenderness/ Whole finger tenderness  Hands : 5/5.    Sacroiliac joints: No local tenderness. TEN negative.   Hips: Full ROM.  No malalignment.  Knees:  Full ROM, without pain, no swelling, warmth or point tenderness.   Ankles: Full ROM, without pain, swelling, warmth or tenderness.  Toes:  Metatarsal squeeze negative  Cervical spine: No tenderness or limitation of movement  Lumbar spine: No tenderness or limitation of movement        Laboratory Testin2024: ESR/CRP/RF/CCP normal/negative    Imaging:  XR fingers (left index) 2024:   X-rays of the left index finger demonstrate mineralization about  palmar aspect of the distal aspect of middle phalanx. Likely  consistent with calcific tendinitis of the flexor digitorum  profundus. No acute fracture or dislocation.    Assessment/Plan   Elena Michelle is a 54 y.o. female with PMHx of Crohn's disease, fatty liver, left carpal tunnel syndrome, Asthma,  who is presenting today " as a NP for hand pain      She has left index finger pain and diffuse tenderness (to the whole finger not only PIPs/ DIPs)  inability to bend. This started shortly after her CTS and CMC arthoplasty in 8/2024. She has x-rays of her index finger that showed findings consistent with calcific tendinitis of the flexor digitorum profundus. Her IBD/Crohn's is well controlled on Skyrizi. No inflammatory arthropathy/tendinopathy identified otherwise. I don't think this is related to IBD and recommend to continue to follow up with ortho. Continue hand OT.       The assessment and plan, risk and benefits were discussed with the patient. All of the patients questions were answered and patient agrees to the plan.      Erica Garrett MD  Clinical   Department of Rheumatology   [facility]

## 2024-12-17 ENCOUNTER — OFFICE VISIT (OUTPATIENT)
Dept: ORTHOPEDIC SURGERY | Facility: CLINIC | Age: 54
End: 2024-12-17
Payer: COMMERCIAL

## 2024-12-17 DIAGNOSIS — M79.642 LEFT HAND PAIN: ICD-10-CM

## 2024-12-17 PROCEDURE — 99213 OFFICE O/P EST LOW 20 MIN: CPT | Performed by: ORTHOPAEDIC SURGERY

## 2024-12-17 NOTE — PROGRESS NOTES
Chief Complaint   Chief Complaint   Patient presents with    Left Hand - Pain         HPI:      Elena Michelle is a pleasant 54 y.o. year-old female who is seen today for pain/stiffness in her left hand. In August of 2024 the pt underwent CMC and carpal tunnel surgery. She had previously had the same surgery on her right hand in March 2024. However, this time she had tremendous pain and stiffness immediatley following surgery. Dr. Perez performed both surgeries. She has completed all OT. This is troubling her since she is a  at the [employer] and her stiffness is interfering with her work. She also mentions shooting pain around her wrist throughout her fingers. She has tried a cortisone injection which did not provide her any relief.    Review of Systems    There were no vitals filed for this visit.    Past Medical History:   Diagnosis Date    Arthritis     Aspirin sensitivity     BMI 31.0-31.9,adult     Carpal tunnel syndrome     Chronic diarrhea     Chronic ethmoidal sinusitis     Chronic maxillary sinusitis     COVID-19     NOT VACCINATED    Crohn's colitis (Multi)     Crohn's colitis (Multi)     Decreased sense of smell     Fatty liver     GERD (gastroesophageal reflux disease)     Ileitis     Memory loss     Nasal polyp     Pain in left foot 07/01/2021    Left foot pain    Pain in right foot 07/01/2021    Right foot pain    Plantar fasciitis, bilateral     Post-nasal drip     Rhinorrhea     Wears contact lenses     Wears dentures     Wears glasses      Patient Active Problem List   Diagnosis    Abdominal cramping    Chronic diarrhea    Chronic ethmoidal sinusitis    Chronic maxillary sinusitis    Contusion of knee, right    Cough in adult    Crohn's disease (Multi)    Decreased sense of smell    Facial pressure    Fatty liver    Ileitis    Memory loss    Nasal congestion    Nasal polyp    Needs smoking cessation education    Otitis media, recurrent    Plantar fasciitis, bilateral    Post-nasal  drip    Right knee pain    Swelling of index finger    Left carpal tunnel syndrome    Joint stiffness    Steatosis of liver    Knee pain    Mild intermittent asthma without complication (HHS-HCC)       Medication Documentation Review Audit       Reviewed by Kendall Casanova MA (Medical Assistant) on 12/17/24 at 0847      Medication Order Taking? Sig Documenting Provider Last Dose Status   acetaminophen (Tylenol) 500 mg tablet [id number] No Take 1 tablet (500 mg) by mouth every 8 hours if needed for pain. Historical Provider, MD Taking Active   Discontinued 12/11/24 0817     Discontinued 12/11/24 0817   Patient not taking:  Discontinued 12/11/24 0834   omeprazole (PriLOSEC) 40 mg DR capsule [id number]  Take 1 capsule (40 mg) by mouth once daily. Do not crush or chew. Aryan SUAREZ MD  Active   risankizumab-rzaa (Skyrizi) 360 mg/2.4 mL (150 mg/mL) wearable injector injection [id number]  Inject 2.4 mL (360 mg) under the skin every 8 (eight) weeks starting at week 12 as directed per provider. Haresh Parnell MD  Active                    Allergies   Allergen Reactions    Hydrocodone-Acetaminophen Itching     Vicodin    Ibuprofen Other     WATERY EYES, NASAL CONGESTION, PUFFY EYES    Naproxen Sodium Other     WATERY EYES, NASAL CONGESTION, PUFFY EYES    Nsaids (Non-Steroidal Anti-Inflammatory Drug) Other     WATERY EYES, NASAL CONGESTION, PUFFY EYES    Propoxyphene-Acetaminophen Swelling and Angioedema       Social History     Socioeconomic History    Marital status:      Spouse name: Not on file    Number of children: Not on file    Years of education: Not on file    Highest education level: Not on file   Occupational History    Not on file   Tobacco Use    Smoking status: Former     Types: Cigarettes     Passive exposure: Never    Smokeless tobacco: Never   Vaping Use    Vaping status: Never Used   Substance and Sexual Activity    Alcohol use: Not Currently    Drug use: Never    Sexual activity: Yes     Partners: Male    Other Topics Concern    Not on file   Social History Narrative    Not on file     Social Drivers of Health     Financial Resource Strain: Not on file   Food Insecurity: Not on file   Transportation Needs: Not on file   Physical Activity: Not on file   Stress: Not on file   Social Connections: Not on file   Intimate Partner Violence: Not on file   Housing Stability: Not on file       Past Surgical History:   Procedure Laterality Date     SECTION, CLASSIC      CHOLECYSTECTOMY      COLONOSCOPY      (Fiberoptic)    HYSTERECTOMY      NOSE SURGERY      Rhinologic surgery    TONSILLECTOMY         There is no height or weight on file to calculate BMI.    HgA1c:   Lab Results   Component Value Date    HGBA1C 5.9 (A) 2023    CNTEWIAQ8G 123 2023       Physical Exam:  - Hand: decreased ROM in MCP, DIP and PIP of first and second digit of left hand    Imaging:  XR left index finger: Calcific tendinitis of the flexor digitorum     X-rays of the left thumb demonstrate prior resection trapezium. No  acute fracture or dislocation.    Impression/Plan:  Patient has stiffness and pain in first and second digit of left hand after CMC and Carpal tunnel surgery  -Recommend obtaining MRI of the hand and follow-up with hand subspecialist

## 2024-12-30 ENCOUNTER — OFFICE VISIT (OUTPATIENT)
Dept: URGENT CARE | Age: 54
End: 2024-12-30
Payer: COMMERCIAL

## 2024-12-30 ENCOUNTER — HOSPITAL ENCOUNTER (EMERGENCY)
Facility: HOSPITAL | Age: 54
Discharge: LEFT BEFORE DISCHARGE | End: 2024-12-30
Payer: COMMERCIAL

## 2024-12-30 VITALS
BODY MASS INDEX: 32.1 KG/M2 | SYSTOLIC BLOOD PRESSURE: 119 MMHG | RESPIRATION RATE: 18 BRPM | OXYGEN SATURATION: 97 % | TEMPERATURE: 97.7 F | HEART RATE: 113 BPM | DIASTOLIC BLOOD PRESSURE: 83 MMHG | WEIGHT: 170 LBS | HEIGHT: 61 IN

## 2024-12-30 DIAGNOSIS — R10.84 GENERALIZED ABDOMINAL PAIN: ICD-10-CM

## 2024-12-30 DIAGNOSIS — Z87.19 HISTORY OF CROHN'S DISEASE: ICD-10-CM

## 2024-12-30 DIAGNOSIS — Z01.89 PATIENT REQUEST FOR DIAGNOSTIC TESTING: ICD-10-CM

## 2024-12-30 DIAGNOSIS — R19.7 DIARRHEA, UNSPECIFIED TYPE: Primary | ICD-10-CM

## 2024-12-30 LAB — POC SARS-COV-2 AG BINAX: NORMAL

## 2024-12-30 PROCEDURE — 99213 OFFICE O/P EST LOW 20 MIN: CPT | Performed by: PHYSICIAN ASSISTANT

## 2024-12-30 PROCEDURE — 3008F BODY MASS INDEX DOCD: CPT | Performed by: PHYSICIAN ASSISTANT

## 2024-12-30 PROCEDURE — 4500999001 HC ED NO CHARGE

## 2024-12-30 PROCEDURE — 87811 SARS-COV-2 COVID19 W/OPTIC: CPT | Performed by: PHYSICIAN ASSISTANT

## 2024-12-30 ASSESSMENT — ENCOUNTER SYMPTOMS
COUGH: 0
EYES NEGATIVE: 1
CARDIOVASCULAR NEGATIVE: 1
MUSCULOSKELETAL NEGATIVE: 1
ABDOMINAL PAIN: 1
ENDOCRINE NEGATIVE: 1
HEMATOLOGIC/LYMPHATIC NEGATIVE: 1
BLOOD IN STOOL: 0
NAUSEA: 1
PSYCHIATRIC NEGATIVE: 1
ALLERGIC/IMMUNOLOGIC NEGATIVE: 1
FEVER: 1
NEUROLOGICAL NEGATIVE: 1
DIARRHEA: 1

## 2024-12-30 NOTE — PROGRESS NOTES
Subjective   Patient ID: Elena Michelle is a 54 y.o. female. They present today with a chief complaint of Other (Diarrhea, No appetite, slight fever 101.1 x 1 day //Pt requesting a covid test as well. ).    History of Present Illness    History provided by:  Patient   used: No    This is a 54 yr old female here for GI sxs. Diffuse abdominal pain, diarrhea, nausea and fever up to 100 x 2 days. No URI sxs, cough or vomiting. No blood in the stool. Hx of crohns.     Past Medical History  Allergies as of 2024 - Reviewed 2024   Allergen Reaction Noted    Hydrocodone-acetaminophen Itching 2023    Ibuprofen Other 2023    Naproxen sodium Other 2023    Nsaids (non-steroidal anti-inflammatory drug) Other 2023    Propoxyphene-acetaminophen Swelling and Angioedema 06/10/2024       (Not in a hospital admission)       Past Medical History:   Diagnosis Date    Arthritis     Aspirin sensitivity     BMI 31.0-31.9,adult     Carpal tunnel syndrome     Chronic diarrhea     Chronic ethmoidal sinusitis     Chronic maxillary sinusitis     COVID-19     NOT VACCINATED    Crohn's colitis (Multi)     Crohn's colitis (Multi)     Decreased sense of smell     Fatty liver     GERD (gastroesophageal reflux disease)     Ileitis     Memory loss     Nasal polyp     Pain in left foot 2021    Left foot pain    Pain in right foot 2021    Right foot pain    Plantar fasciitis, bilateral     Post-nasal drip     Rhinorrhea     Wears contact lenses     Wears dentures     Wears glasses        Past Surgical History:   Procedure Laterality Date     SECTION, CLASSIC      CHOLECYSTECTOMY      COLONOSCOPY      (Fiberoptic)    HYSTERECTOMY      NOSE SURGERY      Rhinologic surgery    TONSILLECTOMY          reports that she has quit smoking. Her smoking use included cigarettes. She has never been exposed to tobacco smoke. She has never used smokeless tobacco. She reports that she does not  "currently use alcohol. She reports that she does not use drugs.    Review of Systems  Review of Systems   Constitutional:  Positive for fever.   HENT:  Negative for congestion.    Eyes: Negative.    Respiratory:  Negative for cough.    Cardiovascular: Negative.    Gastrointestinal:  Positive for abdominal pain, diarrhea and nausea. Negative for blood in stool.   Endocrine: Negative.    Genitourinary: Negative.    Musculoskeletal: Negative.    Skin: Negative.    Allergic/Immunologic: Negative.    Neurological: Negative.    Hematological: Negative.    Psychiatric/Behavioral: Negative.     All other systems reviewed and are negative.       Objective    Vitals:    12/30/24 1608   BP: 119/83   Pulse: (!) 113   Resp: 18   Temp: 36.5 °C (97.7 °F)   TempSrc: Oral   SpO2: 97%   Weight: 77.1 kg (170 lb)   Height: 1.549 m (5' 1\")     No LMP recorded. Patient has had a hysterectomy.    Physical Exam  Vitals and nursing note reviewed.   Constitutional:       Appearance: Normal appearance.   HENT:      Head: Normocephalic and atraumatic.   Cardiovascular:      Rate and Rhythm: Normal rate and regular rhythm.   Pulmonary:      Effort: Pulmonary effort is normal.      Breath sounds: Normal breath sounds.   Abdominal:      Palpations: Abdomen is soft.      Tenderness: There is abdominal tenderness (mid pain with palpation). There is no guarding or rebound.   Skin:     General: Skin is warm and dry.   Neurological:      General: No focal deficit present.      Mental Status: She is alert and oriented to person, place, and time.   Psychiatric:         Mood and Affect: Mood normal.         Behavior: Behavior normal.       Procedures    Point of Care Test & Imaging Results from this visit  Results for orders placed or performed in visit on 12/30/24   POCT Covid-19 Rapid Antigen   Result Value Ref Range    POC HAILY-COV-2 AG  Presumptive negative test for SARS-CoV-2 (no antigen detected)     Presumptive negative test for SARS-CoV-2 (no " antigen detected)      No results found.    Diagnostic study results (if any) were reviewed by Joselin Mathis PA-C.    Assessment/Plan   Allergies, medications, history, and pertinent labs/EKGs/Imaging reviewed by Joselin Mathis PA-C.     Orders and Diagnoses  Diagnoses and all orders for this visit:  Diarrhea, unspecified type  Patient request for diagnostic testing  -     POCT Covid-19 Rapid Antigen  Generalized abdominal pain  History of Crohn's disease    Plan:  Advised pt to go to the ER for more work up and care  Possible crohns flare    Patient disposition: ED    Electronically signed by Joselin Mathis PA-C  4:47 PM

## 2024-12-31 ENCOUNTER — APPOINTMENT (OUTPATIENT)
Dept: RADIOLOGY | Facility: HOSPITAL | Age: 54
End: 2024-12-31
Payer: COMMERCIAL

## 2024-12-31 ENCOUNTER — HOSPITAL ENCOUNTER (EMERGENCY)
Facility: HOSPITAL | Age: 54
Discharge: HOME | End: 2024-12-31
Payer: COMMERCIAL

## 2024-12-31 VITALS
DIASTOLIC BLOOD PRESSURE: 76 MMHG | TEMPERATURE: 98.1 F | HEIGHT: 61 IN | RESPIRATION RATE: 18 BRPM | WEIGHT: 170 LBS | SYSTOLIC BLOOD PRESSURE: 115 MMHG | BODY MASS INDEX: 32.1 KG/M2 | HEART RATE: 99 BPM | OXYGEN SATURATION: 97 %

## 2024-12-31 DIAGNOSIS — R19.7 DIARRHEA, UNSPECIFIED TYPE: Primary | ICD-10-CM

## 2024-12-31 LAB
ALBUMIN SERPL BCP-MCNC: 4 G/DL (ref 3.4–5)
ALP SERPL-CCNC: 108 U/L (ref 33–110)
ALT SERPL W P-5'-P-CCNC: 31 U/L (ref 7–45)
ANION GAP SERPL CALC-SCNC: 12 MMOL/L (ref 10–20)
AST SERPL W P-5'-P-CCNC: 29 U/L (ref 9–39)
BASOPHILS # BLD AUTO: 0.04 X10*3/UL (ref 0–0.1)
BASOPHILS NFR BLD AUTO: 0.5 %
BILIRUB SERPL-MCNC: 0.6 MG/DL (ref 0–1.2)
BUN SERPL-MCNC: 16 MG/DL (ref 6–23)
CALCIUM SERPL-MCNC: 9.4 MG/DL (ref 8.6–10.3)
CHLORIDE SERPL-SCNC: 105 MMOL/L (ref 98–107)
CO2 SERPL-SCNC: 24 MMOL/L (ref 21–32)
CREAT SERPL-MCNC: 0.82 MG/DL (ref 0.5–1.05)
EGFRCR SERPLBLD CKD-EPI 2021: 86 ML/MIN/1.73M*2
EOSINOPHIL # BLD AUTO: 0.33 X10*3/UL (ref 0–0.7)
EOSINOPHIL NFR BLD AUTO: 4.2 %
ERYTHROCYTE [DISTWIDTH] IN BLOOD BY AUTOMATED COUNT: 13.2 % (ref 11.5–14.5)
GLUCOSE SERPL-MCNC: 107 MG/DL (ref 74–99)
HCT VFR BLD AUTO: 42.8 % (ref 36–46)
HGB BLD-MCNC: 14.2 G/DL (ref 12–16)
IMM GRANULOCYTES # BLD AUTO: 0.03 X10*3/UL (ref 0–0.7)
IMM GRANULOCYTES NFR BLD AUTO: 0.4 % (ref 0–0.9)
LACTATE SERPL-SCNC: 0.9 MMOL/L (ref 0.4–2)
LIPASE SERPL-CCNC: 47 U/L (ref 9–82)
LYMPHOCYTES # BLD AUTO: 3.37 X10*3/UL (ref 1.2–4.8)
LYMPHOCYTES NFR BLD AUTO: 43.3 %
MAGNESIUM SERPL-MCNC: 2 MG/DL (ref 1.6–2.4)
MCH RBC QN AUTO: 28.5 PG (ref 26–34)
MCHC RBC AUTO-ENTMCNC: 33.2 G/DL (ref 32–36)
MCV RBC AUTO: 86 FL (ref 80–100)
MONOCYTES # BLD AUTO: 0.45 X10*3/UL (ref 0.1–1)
MONOCYTES NFR BLD AUTO: 5.8 %
NEUTROPHILS # BLD AUTO: 3.56 X10*3/UL (ref 1.2–7.7)
NEUTROPHILS NFR BLD AUTO: 45.8 %
NRBC BLD-RTO: 0 /100 WBCS (ref 0–0)
PLATELET # BLD AUTO: 298 X10*3/UL (ref 150–450)
POTASSIUM SERPL-SCNC: 3.8 MMOL/L (ref 3.5–5.3)
PROT SERPL-MCNC: 7 G/DL (ref 6.4–8.2)
RBC # BLD AUTO: 4.98 X10*6/UL (ref 4–5.2)
SODIUM SERPL-SCNC: 137 MMOL/L (ref 136–145)
WBC # BLD AUTO: 7.8 X10*3/UL (ref 4.4–11.3)

## 2024-12-31 PROCEDURE — 36415 COLL VENOUS BLD VENIPUNCTURE: CPT | Performed by: PHYSICIAN ASSISTANT

## 2024-12-31 PROCEDURE — 83690 ASSAY OF LIPASE: CPT | Performed by: PHYSICIAN ASSISTANT

## 2024-12-31 PROCEDURE — 85025 COMPLETE CBC W/AUTO DIFF WBC: CPT | Performed by: PHYSICIAN ASSISTANT

## 2024-12-31 PROCEDURE — 83735 ASSAY OF MAGNESIUM: CPT | Performed by: PHYSICIAN ASSISTANT

## 2024-12-31 PROCEDURE — 2500000004 HC RX 250 GENERAL PHARMACY W/ HCPCS (ALT 636 FOR OP/ED): Performed by: PHYSICIAN ASSISTANT

## 2024-12-31 PROCEDURE — 99284 EMERGENCY DEPT VISIT MOD MDM: CPT | Mod: 25

## 2024-12-31 PROCEDURE — 96361 HYDRATE IV INFUSION ADD-ON: CPT

## 2024-12-31 PROCEDURE — 83605 ASSAY OF LACTIC ACID: CPT | Performed by: PHYSICIAN ASSISTANT

## 2024-12-31 PROCEDURE — 96374 THER/PROPH/DIAG INJ IV PUSH: CPT

## 2024-12-31 PROCEDURE — 80053 COMPREHEN METABOLIC PANEL: CPT | Performed by: PHYSICIAN ASSISTANT

## 2024-12-31 RX ORDER — ONDANSETRON HYDROCHLORIDE 2 MG/ML
4 INJECTION, SOLUTION INTRAVENOUS ONCE
Status: COMPLETED | OUTPATIENT
Start: 2024-12-31 | End: 2024-12-31

## 2024-12-31 RX ADMIN — SODIUM CHLORIDE 1000 ML: 9 INJECTION, SOLUTION INTRAVENOUS at 07:25

## 2024-12-31 RX ADMIN — ONDANSETRON 4 MG: 2 INJECTION INTRAMUSCULAR; INTRAVENOUS at 07:25

## 2024-12-31 ASSESSMENT — PAIN - FUNCTIONAL ASSESSMENT: PAIN_FUNCTIONAL_ASSESSMENT: 0-10

## 2024-12-31 ASSESSMENT — LIFESTYLE VARIABLES
EVER FELT BAD OR GUILTY ABOUT YOUR DRINKING: NO
HAVE YOU EVER FELT YOU SHOULD CUT DOWN ON YOUR DRINKING: NO
HAVE PEOPLE ANNOYED YOU BY CRITICIZING YOUR DRINKING: NO
TOTAL SCORE: 0
EVER HAD A DRINK FIRST THING IN THE MORNING TO STEADY YOUR NERVES TO GET RID OF A HANGOVER: NO

## 2024-12-31 ASSESSMENT — PAIN SCALES - GENERAL: PAINLEVEL_OUTOF10: 2

## 2024-12-31 NOTE — DISCHARGE INSTRUCTIONS
Please continue drink plenty of oral fluids.  [place] foods as tolerated to help bulk up stool.  Follow-up with your GI doctor

## 2024-12-31 NOTE — ED PROVIDER NOTES
HPI   Chief Complaint   Patient presents with    Abdominal Pain     PT. ARRIVED VIA PRIVATE CAR, DROVE SELF, TO ED FROM HOME FOR ABD PAIN. PT. STATES HX OF CROHN'S. PT. STATES GENERALIZED ABD PAIN X2DAYS W/ NAUSEA/DIARRHEA. LAST ATE SATURDAY 0200. PT. ALSO C/O COUGH, FATIGUE, RUNNY NOSE, CONGESTION.       HPI This is a 54-year-old female with a history of Crohn's disease has been on Skyrizi for the past 2 months speaks of some nausea and diarrhea for the past 2 days.  She was congested but screened negative for COVID yesterday at the urgent care.  She is really not concerned about it is more about her diarrhea.  She feels very dehydrated.  She states she is not drinking fluids because it comes right out the other end.  No blood in her stool.  She has had a follow-up with her GI doctor since starting Skyrizi.  Sometimes she does get steroids.  Vomiting.  Not really eating at all.  States really she does not have that much pain now in her belly.  No chest pain or shortness of breath no fevers that she is aware of.        Patient History   Past Medical History:   Diagnosis Date    Arthritis     Aspirin sensitivity     BMI 31.0-31.9,adult     Carpal tunnel syndrome     Chronic diarrhea     Chronic ethmoidal sinusitis     Chronic maxillary sinusitis     COVID-19     NOT VACCINATED    Crohn's colitis (Multi)     Crohn's colitis (Multi)     Decreased sense of smell     Fatty liver     GERD (gastroesophageal reflux disease)     Ileitis     Memory loss     Nasal polyp     Pain in left foot 2021    Left foot pain    Pain in right foot 2021    Right foot pain    Plantar fasciitis, bilateral     Post-nasal drip     Rhinorrhea     Wears contact lenses     Wears dentures     Wears glasses      Past Surgical History:   Procedure Laterality Date     SECTION, CLASSIC      CHOLECYSTECTOMY      COLONOSCOPY      (Fiberoptic)    HYSTERECTOMY      NOSE SURGERY      Rhinologic surgery    TONSILLECTOMY       Family  History   Problem Relation Name Age of Onset    No Known Problems Mother      No Known Problems Father      No Known Problems Sister      No Known Problems Brother      No Known Problems Daughter      No Known Problems Son      No Known Problems Mother's Sister      No Known Problems Mother's Brother      No Known Problems Father's Sister      No Known Problems Father's Brother      No Known Problems Maternal Grandmother      No Known Problems Maternal Grandfather      No Known Problems Paternal Grandmother      No Known Problems Paternal Grandfather      No Known Problems Other       Social History     Tobacco Use    Smoking status: Former     Types: Cigarettes     Passive exposure: Never    Smokeless tobacco: Never   Vaping Use    Vaping status: Never Used   Substance Use Topics    Alcohol use: Not Currently    Drug use: Never       Physical Exam   ED Triage Vitals [12/31/24 0601]   Temperature Heart Rate Respirations BP   35.9 °C (96.6 °F) 98 18 110/75      Pulse Ox Temp Source Heart Rate Source Patient Position   97 % Temporal Monitor Sitting      BP Location FiO2 (%)     Right arm --       Physical Exam    Reviewed family history social history and allergies and were noncontributory to current problem.    Review of systems as noted in history of present illness  otherwise negative. All other systems were reviewed and negative.     PMHX: Chronic conditions: reviewd in EMR, relevant history noted in HPI                Surgeries, hospitalizations: reviewed in EMR , relevant history noted in HPI                Medications: reviewed in EMR, relevant history noted in HPI                Allergies: reviewed in EMR, relevant history noted in HPI      PHYSICAL EXAM:    GENERAL/ CONSTITUTIONAL: Vitals noted, no distress. Alert and oriented  x 3. Non-toxic.  No Drooling or stridor .    HEAD: Normocephalic Atraumatic    EENT: TMs clear. Posterior oropharynx unremarkable. No meningismus. No LAD.  No exudate present.       EYES: PERRLA EOMI     NECK: Supple. Nontender. No midline tenderness.  Full range of motion    CARDIAC: Regular, rate, rhythm. No murmurs rubs or gallops. No JVD    PULMONARY: Lungs clear bilaterally with good aeration. No wheezes rales or rhonchi. No respiratory distress.     GI: Soft, Minmal diffuse tenderness no peritoneal signs. Normoactive bowel sounds. No pulsatile masses.  No guarding or rebound    EXTREMITIES/MUSCULOSKELTAL: No peripheral edema. Negative Homans bilaterally, NVIT, pulses +2 /4 equal. FROM in all extremities and equal.     SKIN: No rash. Intact.     NEURO: No focal neurologic deficits,     PSYCH: appropriate mood and affect    MEDICAL DECISION MAKING:      ED Course & MDM   Diagnoses as of 12/31/24 0844   Diarrhea, unspecified type     Prescribed ordered fluids given Zofran given imaging with CT first before I exam dose of steroid.  Blood work was returned which is unremarkable.  Patient states she feels better after IV fluids.  She does not want a CT scan and she does not want to give a urine sample at this point because she says she is unable.  She cannot tolerate p.o. fluids she will be discharged to follow-up with her GI doctor            No data recorded     [place] Coma Scale Score: 15 (12/31/24 0730 : Bailee Garcia RN)                           Medical Decision Making  Follow-up GI doctor continue oral fluids.  No bouts of diarrhea when she was here    Procedure  Procedures     Laurie Ramos PA-C  12/31/24 0844       Laurie Ramos PA-C  12/31/24 0844

## 2025-01-07 ENCOUNTER — HOSPITAL ENCOUNTER (OUTPATIENT)
Dept: RADIOLOGY | Facility: CLINIC | Age: 55
Discharge: HOME | End: 2025-01-07
Payer: COMMERCIAL

## 2025-01-07 VITALS — HEIGHT: 61 IN | WEIGHT: 170 LBS | BODY MASS INDEX: 32.1 KG/M2

## 2025-01-07 DIAGNOSIS — Z12.31 ENCOUNTER FOR SCREENING MAMMOGRAM FOR MALIGNANT NEOPLASM OF BREAST: ICD-10-CM

## 2025-01-07 PROCEDURE — 77067 SCR MAMMO BI INCL CAD: CPT

## 2025-01-07 PROCEDURE — 77063 BREAST TOMOSYNTHESIS BI: CPT | Performed by: RADIOLOGY

## 2025-01-07 PROCEDURE — 77067 SCR MAMMO BI INCL CAD: CPT | Performed by: RADIOLOGY

## 2025-01-08 ENCOUNTER — SPECIALTY PHARMACY (OUTPATIENT)
Dept: PHARMACY | Facility: CLINIC | Age: 55
End: 2025-01-08

## 2025-01-08 ENCOUNTER — HOSPITAL ENCOUNTER (OUTPATIENT)
Dept: RADIOLOGY | Facility: HOSPITAL | Age: 55
Discharge: HOME | End: 2025-01-08
Payer: COMMERCIAL

## 2025-01-08 DIAGNOSIS — M79.642 LEFT HAND PAIN: ICD-10-CM

## 2025-01-08 PROCEDURE — 73218 MRI UPPER EXTREMITY W/O DYE: CPT | Mod: LT

## 2025-01-08 PROCEDURE — RXMED WILLOW AMBULATORY MEDICATION CHARGE

## 2025-01-08 PROCEDURE — 73218 MRI UPPER EXTREMITY W/O DYE: CPT | Mod: LEFT SIDE | Performed by: RADIOLOGY

## 2025-01-14 ENCOUNTER — PHARMACY VISIT (OUTPATIENT)
Dept: PHARMACY | Facility: CLINIC | Age: 55
End: 2025-01-14
Payer: COMMERCIAL

## 2025-01-21 ENCOUNTER — APPOINTMENT (OUTPATIENT)
Dept: PRIMARY CARE | Facility: CLINIC | Age: 55
End: 2025-01-21
Payer: COMMERCIAL

## 2025-01-21 VITALS
SYSTOLIC BLOOD PRESSURE: 112 MMHG | DIASTOLIC BLOOD PRESSURE: 79 MMHG | OXYGEN SATURATION: 98 % | BODY MASS INDEX: 32.69 KG/M2 | TEMPERATURE: 96.4 F | HEART RATE: 79 BPM | RESPIRATION RATE: 18 BRPM | WEIGHT: 173 LBS

## 2025-01-21 PROBLEM — R05.9 COUGH IN ADULT: Status: RESOLVED | Noted: 2023-03-07 | Resolved: 2025-01-21

## 2025-01-21 PROBLEM — K52.9 ILEITIS: Status: RESOLVED | Noted: 2023-03-07 | Resolved: 2025-01-21

## 2025-01-21 PROBLEM — J33.9 NASAL POLYP: Status: RESOLVED | Noted: 2023-03-07 | Resolved: 2025-01-21

## 2025-01-21 PROBLEM — M72.2 PLANTAR FASCIITIS, BILATERAL: Status: RESOLVED | Noted: 2023-03-07 | Resolved: 2025-01-21

## 2025-01-21 PROBLEM — G56.02 LEFT CARPAL TUNNEL SYNDROME: Status: RESOLVED | Noted: 2024-04-09 | Resolved: 2025-01-21

## 2025-01-21 PROBLEM — J32.0 CHRONIC MAXILLARY SINUSITIS: Status: RESOLVED | Noted: 2023-03-07 | Resolved: 2025-01-21

## 2025-01-21 PROBLEM — R10.9 ABDOMINAL CRAMPING: Status: RESOLVED | Noted: 2023-03-07 | Resolved: 2025-01-21

## 2025-01-21 PROBLEM — M25.561 RIGHT KNEE PAIN: Status: RESOLVED | Noted: 2023-03-07 | Resolved: 2025-01-21

## 2025-01-21 PROBLEM — M25.569 KNEE PAIN: Status: RESOLVED | Noted: 2024-12-11 | Resolved: 2025-01-21

## 2025-01-21 PROBLEM — R09.81 NASAL CONGESTION: Status: RESOLVED | Noted: 2023-03-07 | Resolved: 2025-01-21

## 2025-01-21 PROBLEM — S80.01XA CONTUSION OF KNEE, RIGHT: Status: RESOLVED | Noted: 2023-03-07 | Resolved: 2025-01-21

## 2025-01-21 PROBLEM — K76.0 FATTY LIVER: Status: RESOLVED | Noted: 2023-03-07 | Resolved: 2025-01-21

## 2025-01-21 PROBLEM — R43.8 DECREASED SENSE OF SMELL: Status: RESOLVED | Noted: 2023-03-07 | Resolved: 2025-01-21

## 2025-01-21 PROBLEM — R44.8 FACIAL PRESSURE: Status: RESOLVED | Noted: 2023-03-07 | Resolved: 2025-01-21

## 2025-01-21 PROBLEM — J33.9 NASAL POLYP: Status: ACTIVE | Noted: 2025-01-21

## 2025-01-21 PROBLEM — J32.2 CHRONIC ETHMOIDAL SINUSITIS: Status: RESOLVED | Noted: 2023-03-07 | Resolved: 2025-01-21

## 2025-01-21 PROBLEM — J45.20 MILD INTERMITTENT ASTHMA WITHOUT COMPLICATION (HHS-HCC): Status: RESOLVED | Noted: 2024-12-11 | Resolved: 2025-01-21

## 2025-01-21 PROBLEM — E66.9 OBESITY WITH BODY MASS INDEX 30 OR GREATER: Status: ACTIVE | Noted: 2019-12-27

## 2025-01-21 PROBLEM — K52.9 CHRONIC DIARRHEA: Status: RESOLVED | Noted: 2023-03-07 | Resolved: 2025-01-21

## 2025-01-21 PROBLEM — K21.00 GASTROESOPHAGEAL REFLUX DISEASE WITH ESOPHAGITIS: Status: ACTIVE | Noted: 2019-12-27

## 2025-01-21 PROBLEM — M79.89 SWELLING OF INDEX FINGER: Status: RESOLVED | Noted: 2023-03-07 | Resolved: 2025-01-21

## 2025-01-21 PROBLEM — R09.82 POST-NASAL DRIP: Status: RESOLVED | Noted: 2023-03-07 | Resolved: 2025-01-21

## 2025-01-21 ASSESSMENT — PAIN SCALES - GENERAL: PAINLEVEL_OUTOF10: 6

## 2025-01-21 NOTE — PROGRESS NOTES
Subjective   Patient ID: Elena Michelle is a 54 y.o. female who presents for Follow-up (Check-up for Medication).    HPI     Review of Systems    Objective   /79 (BP Location: Left arm, Patient Position: Sitting, BP Cuff Size: Adult)   Pulse 79   Temp 35.8 °C (96.4 °F) (Temporal)   Resp 18   Wt 78.5 kg (173 lb)   SpO2 98%   BMI 32.69 kg/m²     Physical Exam    Assessment/Plan   {Assess/PlanSmartLinks:05963}

## 2025-01-27 ENCOUNTER — OFFICE VISIT (OUTPATIENT)
Dept: ORTHOPEDIC SURGERY | Facility: HOSPITAL | Age: 55
End: 2025-01-27
Payer: COMMERCIAL

## 2025-01-27 ASSESSMENT — PAIN - FUNCTIONAL ASSESSMENT: PAIN_FUNCTIONAL_ASSESSMENT: 0-10

## 2025-02-01 ENCOUNTER — OFFICE VISIT (OUTPATIENT)
Dept: URGENT CARE | Age: 55
End: 2025-02-01
Payer: COMMERCIAL

## 2025-02-01 VITALS
SYSTOLIC BLOOD PRESSURE: 114 MMHG | HEART RATE: 97 BPM | BODY MASS INDEX: 32.28 KG/M2 | HEIGHT: 61 IN | DIASTOLIC BLOOD PRESSURE: 80 MMHG | RESPIRATION RATE: 18 BRPM | OXYGEN SATURATION: 95 % | WEIGHT: 171 LBS | TEMPERATURE: 98.3 F

## 2025-02-01 DIAGNOSIS — J02.9 SORE THROAT: ICD-10-CM

## 2025-02-01 DIAGNOSIS — J32.9 SINUSITIS, UNSPECIFIED CHRONICITY, UNSPECIFIED LOCATION: Primary | ICD-10-CM

## 2025-02-01 LAB
POC RAPID INFLUENZA A: NEGATIVE
POC RAPID INFLUENZA B: NEGATIVE
POC RAPID STREP: NEGATIVE
POC SARS-COV-2 AG BINAX: NORMAL

## 2025-02-01 RX ORDER — AMOXICILLIN AND CLAVULANATE POTASSIUM 875; 125 MG/1; MG/1
875 TABLET, FILM COATED ORAL 2 TIMES DAILY
Qty: 20 TABLET | Refills: 0 | Status: SHIPPED | OUTPATIENT
Start: 2025-02-01 | End: 2025-02-11

## 2025-02-01 ASSESSMENT — ENCOUNTER SYMPTOMS
FATIGUE: 1
COUGH: 1
SINUS PAIN: 1
EYE PAIN: 1
SINUS PRESSURE: 1
SORE THROAT: 1
HEADACHES: 1
FEVER: 0

## 2025-02-01 NOTE — PATIENT INSTRUCTIONS
You can use over the counter decongestants for congestion or runny nose (Mucinex or guaifenesin)  Get plenty of rest and fluids to help thin mucous   Use warm compresses or steam to help relieve pressure and thin mucous   Tylenol or ibuprofen every 6 hours for pain or pressure   ER if worsening symptoms including shortness of breath, uncontrolled fevers, chest pain, severe headache   Follow up with your primary care doctor to ensure resolution in the next 5 days

## 2025-02-01 NOTE — PROGRESS NOTES
Subjective   Patient ID: Elena Michelle is a 54 y.o. female. They present today with a chief complaint of Sore Throat (Congestion, pain/pressure behind eyes x 3 days ).    History of Present Illness  Patient is a 54 year old female presenting for evaluation of congestion, headache, sinus pressure, eye pain, sore throat. Reports pain behind both eyes and sinus pressure. Symptoms started 3 days ago, worsened yesterday. No associated fever but reports fatigue and sleeping more than usual. Taking tylenol and Nyquil for symptoms. Minimal dry cough.       History provided by:  Patient   used: No    Sore Throat   Associated symptoms include congestion, coughing and headaches.       Past Medical History  Allergies as of 2025 - Reviewed 2025   Allergen Reaction Noted    Hydrocodone-acetaminophen Itching 2023    Ibuprofen Other 2023    Naproxen sodium Other 2023    Nsaids (non-steroidal anti-inflammatory drug) Other 2023    Propoxyphene-acetaminophen Swelling and Angioedema 06/10/2024       (Not in a hospital admission)       Past Medical History:   Diagnosis Date    Arthritis     Aspirin sensitivity     BMI 31.0-31.9,adult     Carpal tunnel syndrome     Chronic diarrhea     Chronic ethmoidal sinusitis     Chronic maxillary sinusitis     COVID-19     NOT VACCINATED    Crohn's colitis (Multi)     Crohn's colitis (Multi)     Decreased sense of smell     Fatty liver     GERD (gastroesophageal reflux disease)     Ileitis     Memory loss     Nasal polyp     Pain in left foot 2021    Left foot pain    Pain in right foot 2021    Right foot pain    Plantar fasciitis, bilateral     Post-nasal drip     Rhinorrhea     Wears contact lenses     Wears dentures     Wears glasses        Past Surgical History:   Procedure Laterality Date     SECTION, CLASSIC      CHOLECYSTECTOMY      COLONOSCOPY      (Fiberoptic)    HYSTERECTOMY      NOSE SURGERY      Rhinologic  "surgery    TONSILLECTOMY          reports that she has been smoking cigarettes. She has never been exposed to tobacco smoke. She has never used smokeless tobacco. She reports that she does not currently use alcohol. She reports that she does not use drugs.    Review of Systems  Review of Systems   Constitutional:  Positive for fatigue. Negative for fever.   HENT:  Positive for congestion, sinus pressure, sinus pain and sore throat.    Eyes:  Positive for pain.   Respiratory:  Positive for cough.    Neurological:  Positive for headaches.                                  Objective    Vitals:    02/01/25 0940   BP: 114/80   Pulse: 97   Resp: 18   Temp: 36.8 °C (98.3 °F)   TempSrc: Oral   SpO2: 95%   Weight: 77.6 kg (171 lb)   Height: 1.549 m (5' 1\")     No LMP recorded. Patient has had a hysterectomy.    Physical Exam  Constitutional:       General: She is not in acute distress.     Appearance: Normal appearance. She is normal weight. She is ill-appearing. She is not toxic-appearing or diaphoretic.   HENT:      Head: Normocephalic. No right periorbital erythema or left periorbital erythema.      Jaw: There is normal jaw occlusion. No trismus.      Right Ear: Tympanic membrane, ear canal and external ear normal. There is no impacted cerumen.      Left Ear: Tympanic membrane, ear canal and external ear normal. There is no impacted cerumen.      Mouth/Throat:      Mouth: Mucous membranes are moist.      Pharynx: Oropharynx is clear. Uvula midline. Posterior oropharyngeal erythema present. No pharyngeal swelling, oropharyngeal exudate, uvula swelling or postnasal drip.      Tonsils: No tonsillar exudate or tonsillar abscesses.   Eyes:      General: No scleral icterus.        Right eye: No discharge.         Left eye: No discharge.      Conjunctiva/sclera: Conjunctivae normal.   Neck:      Trachea: Phonation normal.   Cardiovascular:      Rate and Rhythm: Normal rate and regular rhythm.      Heart sounds: No murmur heard.   "   No friction rub. No gallop.   Pulmonary:      Effort: Pulmonary effort is normal. No respiratory distress.      Breath sounds: Normal breath sounds. No stridor. No wheezing, rhonchi or rales.   Chest:      Chest wall: No tenderness.   Neurological:      Mental Status: She is alert.   Psychiatric:         Mood and Affect: Mood normal.         Behavior: Behavior normal.         Thought Content: Thought content normal.         Procedures    Point of Care Test & Imaging Results from this visit  Results for orders placed or performed in visit on 02/01/25   POCT Influenza A/B manually resulted   Result Value Ref Range    POC Rapid Influenza A Negative Negative    POC Rapid Influenza B Negative Negative   POCT Covid-19 Rapid Antigen   Result Value Ref Range    POC HAILY-COV-2 AG  Presumptive negative test for SARS-CoV-2 (no antigen detected)     Presumptive negative test for SARS-CoV-2 (no antigen detected)   POCT rapid strep A manually resulted   Result Value Ref Range    POC Rapid Strep Negative Negative      No results found.    Diagnostic study results (if any) were reviewed by Apurva Bedoya PA-C.    Assessment/Plan   Allergies, medications, history, and pertinent labs/EKGs/Imaging reviewed by Apurva Bedoya PA-C.     Medical Decision Making  Patient is a 54 year old female presenting for evaluation of flu like symptoms starting 3 days ago. Hemodynamically stable. Ill appearing.Nontoxic appearing, no meningismus. Posterior oropharynx erythematous, no exudates or vesicular lesions. Uvula is midline and there is no asymmetrical swelling of tonsils, drooling, trismus or muffled voice to suggest RPA or PTA. TM without erythema or bulging to suggest otitis media. Lungs clear to auscultation, low suspicion for pneumonia.     Given patient immunosuppressed, will start antibiotics for sinusitis/URI. Discussed continued supportive care for symptoms, rest, fluids. Declined work note.     At time of discharge, patient was  clinically well-appearing and appropriate for outpatient management. The patient/parent/guardian was educated regarding diagnosis, supportive care, OTC and Rx medications. The patient/parent/guardian was given the opportunity to ask questions prior to discharge. They verbalized understanding of discussion of treatment plan, expected course of illness and/or injury, indications on when to return to , when to seek further evaluation in ED/call 911, and the need to follow up with PCP and/or specialist as referred. Patient/parent/guardian was provided with work/school documentation if requested. Patient stable upon discharge.     Orders and Diagnoses  Diagnoses and all orders for this visit:  Sinusitis, unspecified chronicity, unspecified location  -     amoxicillin-pot clavulanate (Augmentin) 875-125 mg tablet; Take 1 tablet (875 mg) by mouth 2 times a day for 10 days.  Sore throat  -     POCT Influenza A/B manually resulted  -     POCT Covid-19 Rapid Antigen  -     POCT rapid strep A manually resulted      Medical Admin Record      Patient disposition: Home    Electronically signed by Apurva Bedoya PA-C  10:17 AM

## 2025-02-17 ENCOUNTER — HOSPITAL ENCOUNTER (OUTPATIENT)
Dept: RADIOLOGY | Facility: CLINIC | Age: 55
Discharge: HOME | End: 2025-02-17
Payer: COMMERCIAL

## 2025-02-17 ENCOUNTER — OFFICE VISIT (OUTPATIENT)
Dept: ORTHOPEDIC SURGERY | Facility: CLINIC | Age: 55
End: 2025-02-17
Payer: COMMERCIAL

## 2025-02-17 DIAGNOSIS — M79.642 LEFT HAND PAIN: ICD-10-CM

## 2025-02-17 PROCEDURE — 73140 X-RAY EXAM OF FINGER(S): CPT | Mod: LEFT SIDE | Performed by: RADIOLOGY

## 2025-02-17 PROCEDURE — 73140 X-RAY EXAM OF FINGER(S): CPT | Mod: LT

## 2025-02-17 PROCEDURE — 99214 OFFICE O/P EST MOD 30 MIN: CPT | Performed by: ORTHOPAEDIC SURGERY

## 2025-02-17 PROCEDURE — 99213 OFFICE O/P EST LOW 20 MIN: CPT | Performed by: ORTHOPAEDIC SURGERY

## 2025-02-17 NOTE — PROGRESS NOTES
History present illness: Patient presents today for evaluation of the left index finger.  Previous radiographs were concerning for calcific tendinitis.  MRI was done and shows no evidence for calcific tendinitis however.      Past medical history: The patient's past medical history, family history, social history, and review of systems were documented on the patient medical intake.  The updated data was reviewed in the electronic medical record.  History is negative except otherwise stated in history of present illness.        Physical examination:  General: Alert and oriented to person, place, and time.  No acute distress and breathing comfortably: Pleasant and cooperative with examination.  HEENT: Head is normocephalic and atraumatic.  Neck: Supple, no visible swelling.  Cardiovascular: No palpable tachycardia  Lungs: No audible wheezing or labored breathing  Abdomen: Nondistended.  Extremities: Evaluation of the left upper extremity finds the patient had palpable radial artery at the wrist with brisk capillary refill to all digits.  Patient has intact sensation to axillary radial median and ulnar nerves.  There are no open wounds.  There are no signs of infection.  There is no evidence of lymphedema or lymphatic streaking.  The patient has supple compartments to left arm forearm and hand.  Tenderness notable to left index finger at distal interphalangeal joint.      Radiology: Arthritic change left index distal interphalangeal joint.  More perfect lateral achieved on today's x-rays showing no obvious evidence for calcific tendinitis.  Large osteophyte however coming off of palmar aspect of distal P2.      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  Patient is weighing her options.  She is going to return in a couple of weeks for steroid injection to left index finger distal interphalangeal joint.  No x-rays necessary upon  return.        Procedure:

## 2025-02-20 ENCOUNTER — OFFICE VISIT (OUTPATIENT)
Dept: PRIMARY CARE | Facility: CLINIC | Age: 55
End: 2025-02-20
Payer: COMMERCIAL

## 2025-02-20 VITALS
HEART RATE: 97 BPM | SYSTOLIC BLOOD PRESSURE: 110 MMHG | DIASTOLIC BLOOD PRESSURE: 78 MMHG | BODY MASS INDEX: 32.5 KG/M2 | RESPIRATION RATE: 18 BRPM | WEIGHT: 172 LBS | OXYGEN SATURATION: 95 % | TEMPERATURE: 97.2 F

## 2025-02-20 DIAGNOSIS — R68.89 FLU-LIKE SYMPTOMS: Primary | ICD-10-CM

## 2025-02-20 PROCEDURE — 99213 OFFICE O/P EST LOW 20 MIN: CPT | Performed by: FAMILY MEDICINE

## 2025-02-20 NOTE — PROGRESS NOTES
Subjective   Patient ID: Elena Michelle is a 54 y.o. female who presents for Sick Visit, Headache, and Sore Throat.    HPI   Sore throat, body aches,fever, diarrhea    Review of Systems   All other systems reviewed and are negative.      Objective   /78 (BP Location: Left arm, Patient Position: Sitting, BP Cuff Size: Adult)   Pulse 97   Temp 36.2 °C (97.2 °F) (Temporal)   Resp 18   Wt 78 kg (172 lb)   SpO2 95%   BMI 32.50 kg/m²     Physical Exam  Vitals and nursing note reviewed.   Cardiovascular:      Rate and Rhythm: Normal rate and regular rhythm.   Pulmonary:      Effort: Pulmonary effort is normal.      Breath sounds: Normal breath sounds.   Abdominal:      Tenderness: There is abdominal tenderness.   Musculoskeletal:      Cervical back: Neck supple.   Lymphadenopathy:      Cervical: No cervical adenopathy.   Neurological:      Mental Status: She is alert.         Assessment/Plan   Diagnoses and all orders for this visit:  Flu-like symptoms  -     CBC and Auto Differential; Future  -     Comprehensive Metabolic Panel; Future  -     Lipase; Future

## 2025-02-21 LAB
ALBUMIN SERPL-MCNC: 4.4 G/DL (ref 3.6–5.1)
ALP SERPL-CCNC: 106 U/L (ref 37–153)
ALT SERPL-CCNC: 27 U/L (ref 6–29)
ANION GAP SERPL CALCULATED.4IONS-SCNC: 7 MMOL/L (CALC) (ref 7–17)
AST SERPL-CCNC: 16 U/L (ref 10–35)
BASOPHILS # BLD AUTO: 86 CELLS/UL (ref 0–200)
BASOPHILS NFR BLD AUTO: 0.6 %
BILIRUB SERPL-MCNC: 0.6 MG/DL (ref 0.2–1.2)
BUN SERPL-MCNC: 16 MG/DL (ref 7–25)
CALCIUM SERPL-MCNC: 9.7 MG/DL (ref 8.6–10.4)
CHLORIDE SERPL-SCNC: 102 MMOL/L (ref 98–110)
CO2 SERPL-SCNC: 29 MMOL/L (ref 20–32)
CREAT SERPL-MCNC: 0.69 MG/DL (ref 0.5–1.03)
EGFRCR SERPLBLD CKD-EPI 2021: 104 ML/MIN/1.73M2
EOSINOPHIL # BLD AUTO: 329 CELLS/UL (ref 15–500)
EOSINOPHIL NFR BLD AUTO: 2.3 %
ERYTHROCYTE [DISTWIDTH] IN BLOOD BY AUTOMATED COUNT: 12.6 % (ref 11–15)
GLUCOSE SERPL-MCNC: 68 MG/DL (ref 65–99)
HCT VFR BLD AUTO: 40 % (ref 35–45)
HGB BLD-MCNC: 13.3 G/DL (ref 11.7–15.5)
LIPASE SERPL-CCNC: 66 U/L (ref 7–60)
LYMPHOCYTES # BLD AUTO: 2989 CELLS/UL (ref 850–3900)
LYMPHOCYTES NFR BLD AUTO: 20.9 %
MCH RBC QN AUTO: 28.5 PG (ref 27–33)
MCHC RBC AUTO-ENTMCNC: 33.3 G/DL (ref 32–36)
MCV RBC AUTO: 85.8 FL (ref 80–100)
MONOCYTES # BLD AUTO: 930 CELLS/UL (ref 200–950)
MONOCYTES NFR BLD AUTO: 6.5 %
NEUTROPHILS # BLD AUTO: 9967 CELLS/UL (ref 1500–7800)
NEUTROPHILS NFR BLD AUTO: 69.7 %
PLATELET # BLD AUTO: 294 THOUSAND/UL (ref 140–400)
PMV BLD REES-ECKER: 11.6 FL (ref 7.5–12.5)
POTASSIUM SERPL-SCNC: 4.3 MMOL/L (ref 3.5–5.3)
PROT SERPL-MCNC: 7.1 G/DL (ref 6.1–8.1)
RBC # BLD AUTO: 4.66 MILLION/UL (ref 3.8–5.1)
SODIUM SERPL-SCNC: 138 MMOL/L (ref 135–146)
WBC # BLD AUTO: 14.3 THOUSAND/UL (ref 3.8–10.8)

## 2025-02-22 DIAGNOSIS — J01.91 ACUTE RECURRENT SINUSITIS, UNSPECIFIED LOCATION: Primary | ICD-10-CM

## 2025-02-22 RX ORDER — AMOXICILLIN AND CLAVULANATE POTASSIUM 875; 125 MG/1; MG/1
875 TABLET, FILM COATED ORAL 2 TIMES DAILY
Qty: 14 TABLET | Refills: 0 | Status: SHIPPED | OUTPATIENT
Start: 2025-02-22 | End: 2025-03-01

## 2025-02-27 ENCOUNTER — APPOINTMENT (OUTPATIENT)
Dept: ORTHOPEDIC SURGERY | Facility: CLINIC | Age: 55
End: 2025-02-27
Payer: COMMERCIAL

## 2025-03-03 ENCOUNTER — APPOINTMENT (OUTPATIENT)
Dept: GASTROENTEROLOGY | Facility: CLINIC | Age: 55
End: 2025-03-03
Payer: COMMERCIAL

## 2025-03-03 VITALS
BODY MASS INDEX: 33 KG/M2 | DIASTOLIC BLOOD PRESSURE: 80 MMHG | HEART RATE: 94 BPM | SYSTOLIC BLOOD PRESSURE: 109 MMHG | WEIGHT: 174.8 LBS | HEIGHT: 61 IN

## 2025-03-03 DIAGNOSIS — Z11.59 ENCOUNTER FOR SCREENING FOR OTHER VIRAL DISEASES: Primary | ICD-10-CM

## 2025-03-03 DIAGNOSIS — R10.9 ABDOMINAL CRAMPING: ICD-10-CM

## 2025-03-03 DIAGNOSIS — R10.84 GENERALIZED ABDOMINAL PAIN: ICD-10-CM

## 2025-03-03 DIAGNOSIS — K52.9 ILEITIS: ICD-10-CM

## 2025-03-03 DIAGNOSIS — K29.00 ACUTE GASTRITIS WITHOUT HEMORRHAGE, UNSPECIFIED GASTRITIS TYPE: ICD-10-CM

## 2025-03-03 DIAGNOSIS — F17.210 NICOTINE DEPENDENCE, CIGARETTES, UNCOMPLICATED: ICD-10-CM

## 2025-03-03 DIAGNOSIS — R12 HEARTBURN: ICD-10-CM

## 2025-03-03 DIAGNOSIS — K50.919 CROHN'S DISEASE WITH COMPLICATION, UNSPECIFIED GASTROINTESTINAL TRACT LOCATION: ICD-10-CM

## 2025-03-03 DIAGNOSIS — K76.0 FATTY LIVER: ICD-10-CM

## 2025-03-03 DIAGNOSIS — R19.7 DIARRHEA, UNSPECIFIED TYPE: ICD-10-CM

## 2025-03-03 PROCEDURE — 99215 OFFICE O/P EST HI 40 MIN: CPT | Performed by: STUDENT IN AN ORGANIZED HEALTH CARE EDUCATION/TRAINING PROGRAM

## 2025-03-03 PROCEDURE — 3008F BODY MASS INDEX DOCD: CPT | Performed by: STUDENT IN AN ORGANIZED HEALTH CARE EDUCATION/TRAINING PROGRAM

## 2025-03-03 PROCEDURE — 99406 BEHAV CHNG SMOKING 3-10 MIN: CPT | Performed by: STUDENT IN AN ORGANIZED HEALTH CARE EDUCATION/TRAINING PROGRAM

## 2025-03-03 NOTE — PATIENT INSTRUCTIONS
1-for your abdominal pain and bowel movements please continue with Vincenzo, follow a lactose free low fiber diet, avoid NSAIDs and smoking, will need to stay up to date with vaccinations with your primary doctor   annual Pap smear in women, yearly dermatology exam, will Follow blood and stool tests, endoscopic interventions.  2-good job cutting down on smoking

## 2025-03-03 NOTE — PROGRESS NOTES
10/15/21  52yo  lady with history of cholecystectomy, hysterectomy presenting with chronic history of lower abdominal pain, crampy, positive at night, radiating to her back, not relieved by passing gas or bowel movements, associated with nausea and soft stools up to 20 times per day with mucus, started 10 years ago, occurring once every 2 to 3 years and lasting for few weeks a few months. The patient had a CAT scan of the abdomen in July 2020 showing wall thickening of the terminal ileum, however she mentioned that she was never started on treatment or received an accurate diagnosis. She denies any fever chills vomiting dysphagia odynophagia heartburn melena hematochezia hematemesis.     2/11/2022  Patient will need for follow-up, feeling significantly better after starting budesonide taper, denies any episodes of abdominal pain or diarrhea     5/20/22  patient s here for follow up, symptoms recurred 5 d ago, having lower abd pain stabbing, significant watery diarrhea, nausea and vomiting, tolerating liquids, but vomiting solids     10/2023  Had 2 flare ups in 2022 as per her, Patient was recently admitted to [facility] on 9/2023 with severe abdominal pain and watery diarrhea up to 15 or 20 times a day, watery, stool tests unremarkable, CAT scan 9/2023 showing ileal and pancolonic thickening, got partially better on budesonide 9 mg daily and antibiotics    2/23/2024  Patient seen for follow-up, feeling well, having 2 bowel movements per day, normal, intermittently having 4 bowel movements depending on her diet, not at night, denies abdominal pain, patient did not start on Stelara because she was concerned about side effects    3/3/2025  Patient is here for follow-up, feeling well, having 3 bowel movements per day, [place] 4-6, not at night, denies any nausea vomiting abdominal pain    11/8/2024  Patient seen for follow-up, started Skyrizi 9/18/2024, having 1-2 bowel movements per day, has intermittent  episodes of looser stools and abdominal discomfort related to her diet, she underwent left carpal tunnel release on 8/2/2024     EGD 7/2020 at  gastritis, bx wnl in stomach and duodenum  EGD 10/2023 small HH, mild esophagitis gastritis duodenitis  EGD 5/2024 small hiatal hernia, gastritis duodenitis, biopsy of the stomach and duodenum unremarkable  VCE 11-29-21 showing ileal erosions see official report  VCE 11/2023 patchy small bowel erythema  VCE 5/2024 moderate erythema as of 92% of the small intestine to the colon, fair prep, see official report  Colonoscopy 7/2020 at  normal TI, bx wnl in TI and random colon   Colonoscopy 11/2023 TI erosions , H, ac ta, bx unremarkable  Colonoscopy 5/2024 [place] 9, terminal ileitis, hemorrhoids, patchy erythema in the descending sigmoid and rectum, biopsy of the TI and colon unremarkable  Family history reviewed, not pertinent to chief complaint  Bowel movements as above  Denies NSAIDs, alcohol marijuana drug use, trying to cut down on smoking           The note was created using voice recognition transcription software. Despite proofreading, unintentional typographical errors may be present. Please contact the GI office with any questions or concerns.      Current Medications: reviewed     A 10 point review of system is negative except for what is mentioned in the HPI     Follow up with GI was advised         Vital Signs: Reviewed     Physical Exam:  General: no apparent distress, pleasant and cooperative  Skin:  Warm and dry, no jaundice  HEENT: No scleral icterus, no conjunctival pallor, normocephalic, atraumatic, mucous membranes moist  Neck:  atraumatic, trachea midline, no JVD  Chest:  decreased air entry to auscultation bilaterally. No wheezes, rales, or rhonchi  CV:  Regular rate and rhythm.  Positive S1/S2  Abdomen: no distension, +BS, soft, non-tender to palpation, no rebound tenderness, no guarding, no rigidity, no discernible ascites   Extremities: no lower  extremity edema, Chronic pigmentary changes, no cyanosis  Neurological:  A&Ox3 , no asterixis  Psychiatric: cooperative      Investigations:  Labs, radiological imaging and cardiac work up were reviewed     1-hepatitis C antibody negative in 10/2023     2-BMI 33 up from 32, lifestyle modifications advised     3- HCM, colonoscopy as above, ascending colon TA in 2023, will repeat according to findings below      4-Chronic intermittent abdominal pain and soft stools described as above suspicious for  Crohn's, initially planned for Stelara in 2022 however patient did not start her Stelara because she was concerned about side effects, was on budesonide after egd-colono on 5-16-24, currently on Skyrizi since 9/18/2024, suspect additional functional component    5/20/22 abd pain and significant diarrhea recurred,  9/2023 admitted to [facility] with severe abdominal pain and diarrhea, CAT scan 9/2023 showing ileal and pancolonic thickening, egd and colonoscoopy showing non specific findings as above, but was already on budesonide, will require capsule endoscopy for further characterization of small bowel disease, partially better on budesonide, will plan for Stelara  10/2023 received budesonide course  2/26/24 spoke to rheumatology Dr Romero: Suspect some of her symptoms are related to neuropathy however if this is a possibility that her untreated Crohn's may be causing some inflammation within her joints considering a biologic that may include joint coverage would be helpful.      -CAT scan of the abdomen in July 2020 showing wall thickening of the terminal ileum see official report, records of EGD and colonoscopy as above, smoking cessation advised,   10/28/21 discussed with radiology Dr Gastelum, CT from 6/2020 highly suspicious for Crohn's, recent CTE 10/20/21 with resolved findings   11-29-21 VCE showing ileal erosions see official report  12/2/21 discussed with patient, after discussing with radiology and reviewing  vce results, findings suggest ileal Crohn's disease, started budesonide with current significant improvement and resolution of symptoms      -7/2024 TPMT normal, negative TB, negative hepatitis B, prior EBV infection  2024  CRP 0.61 in 1/2024, Calprotectin 8 in 10/2024, calprotectin less than 5 and 11/2024  CDAI 140 asymp 2/2024, 100 asympt on 11/8/2024        Avoid NSAIDs and smoking, lactose-free low fiber diet, follow crp, stool calprotectin, Dexa scan, will need to stay up to date with vaccinations, yearly dermatology exam, annual Pap smear, follow-up markers (CRP, calprotectin), endoscopic procedures, will check CT enterography, will consider capsule endoscopy     4- fatty liver, healthy lifestyle advised, fib 4 score F0 F1 in 2/2024    5-trying to cut down on smoking again, cessation advised, 4 minutes

## 2025-03-06 ENCOUNTER — SPECIALTY PHARMACY (OUTPATIENT)
Dept: PHARMACY | Facility: CLINIC | Age: 55
End: 2025-03-06

## 2025-03-06 PROCEDURE — RXMED WILLOW AMBULATORY MEDICATION CHARGE

## 2025-03-08 ENCOUNTER — PHARMACY VISIT (OUTPATIENT)
Dept: PHARMACY | Facility: CLINIC | Age: 55
End: 2025-03-08
Payer: COMMERCIAL

## 2025-03-25 ENCOUNTER — SPECIALTY PHARMACY (OUTPATIENT)
Dept: PHARMACY | Facility: CLINIC | Age: 55
End: 2025-03-25

## 2025-03-25 NOTE — PROGRESS NOTES
[facility] Clinical Note  Patient Reassessment     Introduction  Elena Michelle is a 54 y.o. female who is on the specialty pharmacy service for management of: Gastroenterology Core.      [facility] [place] supplied medication: Skyrizi 360 mg once every 8 weeks        Duration of therapy: Maintenance    The most recent encounter visit with the referring prescriber Dr. Parnell on 3/3/25 was reviewed.  Pharmacy will continue to collaborate in the care of this patient with the referring prescriber.    Discussion  Elena was contacted on 3/25/2025 at 12:40 PM for a pharmacy visit with encounter number [id number] from:   [facility]  [address] [place] [id number]  Dept: [phone]  Dept Fax: [phone]  Elena consented to a/an Telephone visit, which was performed.    Efficacy  Patient has developed new symptoms of condition: No  Patient/caregiver feels medication is affecting the disease state: working well, has seen improvement since starting OBI    Goals  Provided education on goals and possible outcomes of therapy:  Adherence with therapy  Timely completion of appropriate labs  Timely and appropriate follow up with provider  Identify and address medication interactions with presciption medications, OTC medications and supplements  Optimize or maintain quality of life  Gastroenterology: Improve gastrointestinal clinical symptoms such as abdominal pain, inflammation, diarrhea, constipation, and bleeding, Reduce frequency and urgency of bowel movements, and Allow for GI mucosal healing (observed through endoscopy and colonoscopy)  Patient has documented target(s) for goals of therapy: Yes  Patient status for goal(s): On track    Tolerance  Patient has experienced side effects from this medication: Yes - patient reports muscle pain and nose bleeds since starting medication, provider is aware. Counseled patient that these side effects are not reported with Skyrizi  but will follow up and see if persistent at next reassessment.  Changes to current therapy regimen: No    The follow-up timeline was discussed. Every person responds to and reacts to therapy differently. Patient should be assessed for efficacy and tolerability in approximately: 3 months     Adherence  Patient Information  Informant: Self (Patient)  Demonstrates Understanding of Importance of Adherence: Yes  Does the patient have any barriers to self-administration (including physical and mental?): No  Medication Information  Medication: risankizumab-rzaa (Skyrizi)  Patient Reported Missed Doses in the Last 4 Weeks: 0  Estimated Medication Adherence Level: Good  Adherence Estimation Source: Claims history  Barriers to Adherence: No Problems identified   The importance of adherence was discussed and patient/caregiver was advised to take the medication as prescribed by their provider. Encouraged patient/caregiver to call physician's office or specialty pharmacy if they have a question regarding a missed dose.    General Assessment  Changes to home medications, OTCs or supplements: No  Current Outpatient Medications   Medication Sig Dispense Refill    acetaminophen (Tylenol) 500 mg tablet Take 1 tablet (500 mg) by mouth every 8 hours if needed for pain.      omeprazole (PriLOSEC) 40 mg DR capsule Take 1 capsule (40 mg) by mouth once daily. Do not crush or chew. 30 capsule 3    risankizumab-rzaa (Skyrizi) 360 mg/2.4 mL (150 mg/mL) wearable injector injection Inject 2.4 mL (360 mg) under the skin every 8 (eight) weeks starting at week 12 as directed per provider. 2.4 mL 3     No current facility-administered medications for this visit.     Reported new allergies: No  Reported new medical conditions: No  Additional monitoring reviewed: Gastroenterology - TB:   Lab Results   Component Value Date    TBSIN Negative 07/29/2024   , LFTs and bilirubin:   Lab Results   Component Value Date    ALT 27 02/21/2025    AST 16  02/21/2025    ALKPHOS 106 02/21/2025    BILITOT 0.6 02/21/2025   , and Fecal calprotectin:   Lab Results   Component Value Date    CALPS <5 11/13/2024     Is laboratory follow up needed? No    Advised to contact the pharmacy if there are any changes to the patient's medication list, including prescriptions, OTC medications, herbal products, or supplements.    Impression/Plan  This patient has not been identified as high risk due to Lack of high risk qualifiers.  The following action was taken:N/A    Provided contact information ([phone]) for [facility] and reviewed dispensing process, refill timeline and patient management follow up. Confirmed understanding of education conducted during assessment. All questions and concerns were addressed and patient/caregiver was encouraged to reach out for additional questions or concerns.    Based on the patient's diagnosis, medication list, progress towards goals, adherence, tolerance, and medication list, medication remains appropriate: Therapy remains appropriate (I attest)    Farheen Penny, PharmD

## 2025-04-02 ENCOUNTER — APPOINTMENT (OUTPATIENT)
Dept: DERMATOLOGY | Facility: CLINIC | Age: 55
End: 2025-04-02
Payer: COMMERCIAL

## 2025-04-02 DIAGNOSIS — Z12.83 ENCOUNTER FOR SCREENING FOR MALIGNANT NEOPLASM OF SKIN: ICD-10-CM

## 2025-04-02 DIAGNOSIS — D23.9 DERMATOFIBROMA: ICD-10-CM

## 2025-04-02 DIAGNOSIS — D22.5 MELANOCYTIC NEVI OF TRUNK: ICD-10-CM

## 2025-04-02 DIAGNOSIS — L81.4 LENTIGO: ICD-10-CM

## 2025-04-02 DIAGNOSIS — D18.01 HEMANGIOMA OF SKIN: ICD-10-CM

## 2025-04-02 DIAGNOSIS — L57.8 PHOTOAGING OF SKIN: Primary | ICD-10-CM

## 2025-04-02 DIAGNOSIS — D22.60 MELANOCYTIC NEVI OF UNSPECIFIED UPPER LIMB, INCLUDING SHOULDER: ICD-10-CM

## 2025-04-02 DIAGNOSIS — Z92.25 HISTORY OF IMMUNOSUPPRESSION THERAPY: ICD-10-CM

## 2025-04-02 PROCEDURE — 99213 OFFICE O/P EST LOW 20 MIN: CPT | Performed by: DERMATOLOGY

## 2025-04-02 NOTE — PROGRESS NOTES
Subjective     Elena Michelle is a 54 y.o. female who presents for the following: Skin Check (Pt here for FBSE. Pt is immunocompromised, on Skyrizi for Crohn's Disease. No hx of skin cancer. No concerns.).     She's been on Skyrizi since 9/2024.    Review of Systems:  No other skin or systemic complaints other than what is documented elsewhere in the note.    The following portions of the chart were reviewed this encounter and updated as appropriate:         Skin Cancer History  No skin cancer on file.      Specialty Problems    None       Objective   Well appearing patient in no apparent distress; mood and affect are within normal limits.    A full examination was performed including scalp, head, eyes, ears, nose, lips, neck, chest, axillae, abdomen, back, buttocks, bilateral upper extremities, bilateral lower extremities, hands, feet, fingers, toes, fingernails, and toenails. Declined examination of genitals. All findings within normal limits unless otherwise noted below.    Assessment/Plan   1. Photoaging of skin  Mottled pigmentation with telangiectasias and brown reticular macules in sun exposed areas of the body.    The risk of chronic, cumulative sun damage and risk of development of skin cancer was reviewed today.   The importance of sun protection was reviewed: including the use of a broad spectrum sunscreen that protects against both UVA/UVB rays, with ingredients such as Zinc oxide or titanium dioxide, wearing sun protective clothing and sun avoidance. We reviewed the warning signs of non-melanoma skin cancer and ABCDEs of melanoma  Please follow up should you notice any new or changing pre-existing skin lesion.    2. Melanocytic nevi of unspecified upper limb, including shoulder (2)  Left Arm, Right Arm  Scattered, uniform and benign-appearing, regular brown melanocytic papules and macules.    Clinically benign appearing nevi, no treatment is necessary.  The importance of sun protection was reviewed:  including the use of a broad spectrum sunscreen that protects against both UVA/UVB rays, with ingredients such as Zinc oxide or titanium dioxide, wearing sun protective clothing and sun avoidance.   ABCDEs of melanoma reviewed.  Please follow up should you notice any new or changing pre-existing skin lesion.    3. Melanocytic nevi of trunk  Torso - Posterior (Back)  Tan-brown symmetric macules and papules    Clinically benign appearing nevi, no treatment is necessary.  The importance of sun protection was reviewed: including the use of a broad spectrum sunscreen that protects against both UVA/UVB rays, with ingredients such as Zinc oxide or titanium dioxide, wearing sun protective clothing and sun avoidance.   ABCDEs of melanoma reviewed.  Please follow up should you notice any new or changing pre-existing skin lesion.    4. Hemangioma of skin  Cherry red papules    The benign nature of these skin lesions were reviewed, no treatment is necessary.   Please follow up for any new or pre-existing lesion that is changing in size, shape, color, becomes painful, tender, itches or bleed.    5. Dermatofibroma (3)  Left Lower Leg - Posterior, Right Lower Leg - Anterior, Right Upper Back  Firm pink papule with hyperpigmented halo, +dimple sign    Benign, scar tissue like growth that most frequently is due to bug bite or ingrown hair. No treatment is necessary.    6. Lentigo  Scattered tan macules in sun-exposed areas.    These are benign skin lesions due to sun exposure. They will darken in response to sun exposure. They should be monitored for change in size, shape or color.  These lesions can be treated cosmetically with topical creams, liquid nitrogen and a variety of lasers.    7. History of immunosuppression therapy  No suspicious lesions noted on examination today      Patient on Skyrizi for Crohns with good control.  Aware of potential increased risk for non-melanoma skin cancers on therapy.  Warning signs of skin  cancer reviewed.    8. Encounter for screening for malignant neoplasm of skin  No suspicious lesions noted on examination today    The risk of chronic, cumulative sun damage and risk of development of skin cancer was reviewed today.   The importance of sun protection was reviewed: including the use of a broad spectrum sunscreen that protects against both UVA/UVB rays, with ingredients such as Zinc oxide or titanium dioxide, wearing sun protective clothing and sun avoidance. We reviewed the warning signs of non-melanoma skin cancer and ABCDEs of melanoma  Please follow up should you notice any new or changing pre-existing skin lesion.    Related Procedures  Follow Up In Dermatology - Established Patient      Follow up in 1 year for KANU Miller MD  PGY-2, Dermatology    I saw and evaluated the patient. I personally obtained the key and critical portions of the history and physical exam or was physically present for key and critical portions performed by the resident/fellow. I reviewed the resident/fellow's documentation and discussed the patient with the resident/fellow. I agree with the resident/fellow's medical decision making as documented in the note.    Eulalia Castrejon, DO

## 2025-04-04 ENCOUNTER — APPOINTMENT (OUTPATIENT)
Dept: RADIOLOGY | Facility: HOSPITAL | Age: 55
End: 2025-04-04
Payer: COMMERCIAL

## 2025-04-09 ENCOUNTER — OFFICE VISIT (OUTPATIENT)
Dept: PRIMARY CARE | Facility: CLINIC | Age: 55
End: 2025-04-09
Payer: COMMERCIAL

## 2025-04-09 VITALS
TEMPERATURE: 96.4 F | WEIGHT: 174.1 LBS | SYSTOLIC BLOOD PRESSURE: 106 MMHG | HEART RATE: 82 BPM | OXYGEN SATURATION: 96 % | BODY MASS INDEX: 32.9 KG/M2 | DIASTOLIC BLOOD PRESSURE: 68 MMHG

## 2025-04-09 DIAGNOSIS — R09.81 NASAL CONGESTION: Primary | ICD-10-CM

## 2025-04-09 DIAGNOSIS — R73.03 PREDIABETES: ICD-10-CM

## 2025-04-09 PROCEDURE — 99214 OFFICE O/P EST MOD 30 MIN: CPT | Performed by: FAMILY MEDICINE

## 2025-04-09 RX ORDER — DEXAMETHASONE 6 MG/1
12 TABLET ORAL ONCE
Qty: 2 TABLET | Refills: 0 | Status: SHIPPED | OUTPATIENT
Start: 2025-04-09 | End: 2025-04-09

## 2025-04-09 ASSESSMENT — PAIN SCALES - GENERAL: PAINLEVEL_OUTOF10: 4

## 2025-04-09 NOTE — PROGRESS NOTES
Subjective   History was provided by the patient.  Elena Michelle is a 54 y.o. female who presents for evaluation of symptoms of a URI. Symptoms include post nasal drip and sinus and nasal congestion. Onset of symptoms was 1 week ago, gradually worsening since that time. Associated symptoms include bilateral ear pressure/pain and cough described as nonproductive. She is drinking moderate amounts of fluids. Evaluation to date: none. Treatment to date: decongestants    Objective   /68 (BP Location: Left arm, Patient Position: Sitting, BP Cuff Size: Adult)   Pulse 82   Temp 35.8 °C (96.4 °F) (Temporal)   Wt 79 kg (174 lb 1.6 oz)   SpO2 96%   BMI 32.90 kg/m²   Physical Exam  Vitals and nursing note reviewed.   HENT:      Nose: Congestion and rhinorrhea present.   Cardiovascular:      Rate and Rhythm: Normal rate and regular rhythm.   Pulmonary:      Effort: Pulmonary effort is normal.      Breath sounds: Normal breath sounds.   Musculoskeletal:      Cervical back: Neck supple.   Lymphadenopathy:      Cervical: No cervical adenopathy.   Neurological:      Mental Status: She is alert.          Assessment/Plan   viral upper respiratory illness    Discussed diagnosis and treatment of URI.  Suggested symptomatic OTC remedies.  Nasal saline spray for congestion.  Follow up as needed.

## 2025-04-10 LAB
BASOPHILS # BLD AUTO: 67 CELLS/UL (ref 0–200)
BASOPHILS NFR BLD AUTO: 0.7 %
CHOLEST SERPL-MCNC: 223 MG/DL
CHOLEST/HDLC SERPL: 4.6 (CALC)
CRP SERPL-MCNC: 7.2 MG/L
EOSINOPHIL # BLD AUTO: 551 CELLS/UL (ref 15–500)
EOSINOPHIL NFR BLD AUTO: 5.8 %
ERYTHROCYTE [DISTWIDTH] IN BLOOD BY AUTOMATED COUNT: 13 % (ref 11–15)
EST. AVERAGE GLUCOSE BLD GHB EST-MCNC: 128 MG/DL
EST. AVERAGE GLUCOSE BLD GHB EST-SCNC: 7.1 MMOL/L
HBA1C MFR BLD: 6.1 % OF TOTAL HGB
HCT VFR BLD AUTO: 41.2 % (ref 35–45)
HDLC SERPL-MCNC: 49 MG/DL
HGB BLD-MCNC: 13.5 G/DL (ref 11.7–15.5)
LDLC SERPL CALC-MCNC: 153 MG/DL (CALC)
LYMPHOCYTES # BLD AUTO: 4028 CELLS/UL (ref 850–3900)
LYMPHOCYTES NFR BLD AUTO: 42.4 %
MCH RBC QN AUTO: 29 PG (ref 27–33)
MCHC RBC AUTO-ENTMCNC: 32.8 G/DL (ref 32–36)
MCV RBC AUTO: 88.6 FL (ref 80–100)
MONOCYTES # BLD AUTO: 513 CELLS/UL (ref 200–950)
MONOCYTES NFR BLD AUTO: 5.4 %
NEUTROPHILS # BLD AUTO: 4342 CELLS/UL (ref 1500–7800)
NEUTROPHILS NFR BLD AUTO: 45.7 %
NONHDLC SERPL-MCNC: 174 MG/DL (CALC)
PLATELET # BLD AUTO: 296 THOUSAND/UL (ref 140–400)
PMV BLD REES-ECKER: 11.5 FL (ref 7.5–12.5)
RBC # BLD AUTO: 4.65 MILLION/UL (ref 3.8–5.1)
T3 SERPL-MCNC: 123 NG/DL (ref 76–181)
T4 FREE SERPL-MCNC: 1 NG/DL (ref 0.8–1.8)
TRIGL SERPL-MCNC: 97 MG/DL
TSH SERPL-ACNC: 7.98 MIU/L
WBC # BLD AUTO: 9.5 THOUSAND/UL (ref 3.8–10.8)

## 2025-04-16 LAB — CALPROTECTIN STL-MCNT: <5 MCG/G

## 2025-04-20 ENCOUNTER — HOSPITAL ENCOUNTER (OUTPATIENT)
Dept: RADIOLOGY | Facility: HOSPITAL | Age: 55
Discharge: HOME | End: 2025-04-20
Payer: COMMERCIAL

## 2025-04-20 DIAGNOSIS — K50.919 CROHN'S DISEASE WITH COMPLICATION, UNSPECIFIED GASTROINTESTINAL TRACT LOCATION: ICD-10-CM

## 2025-04-20 PROCEDURE — 2550000001 HC RX 255 CONTRASTS: Performed by: STUDENT IN AN ORGANIZED HEALTH CARE EDUCATION/TRAINING PROGRAM

## 2025-04-20 PROCEDURE — 2500000005 HC RX 250 GENERAL PHARMACY W/O HCPCS: Performed by: STUDENT IN AN ORGANIZED HEALTH CARE EDUCATION/TRAINING PROGRAM

## 2025-04-20 PROCEDURE — 74177 CT ABD & PELVIS W/CONTRAST: CPT | Performed by: RADIOLOGY

## 2025-04-20 PROCEDURE — 74177 CT ABD & PELVIS W/CONTRAST: CPT

## 2025-04-20 RX ADMIN — IOHEXOL 75 ML: 350 INJECTION, SOLUTION INTRAVENOUS at 09:05

## 2025-04-20 RX ADMIN — Medication 500 ML: at 09:07

## 2025-04-20 RX ADMIN — Medication 500 ML: at 09:06

## 2025-04-22 ENCOUNTER — TELEPHONE (OUTPATIENT)
Dept: PRIMARY CARE | Facility: CLINIC | Age: 55
End: 2025-04-22
Payer: COMMERCIAL

## 2025-04-22 NOTE — TELEPHONE ENCOUNTER
Spoke with the patient and per Dr. Baeza lab results showed viral illness and the doctor advise patient to use nasal saline irrigation, Mucinex -D, hydration.  Patient stated that she is using the Mucinex D but not better.  Patient has been advised to go to the ER if she is feeling worse.

## 2025-04-28 ENCOUNTER — SPECIALTY PHARMACY (OUTPATIENT)
Dept: PHARMACY | Facility: CLINIC | Age: 55
End: 2025-04-28

## 2025-04-28 PROCEDURE — RXMED WILLOW AMBULATORY MEDICATION CHARGE

## 2025-04-29 ENCOUNTER — PHARMACY VISIT (OUTPATIENT)
Dept: PHARMACY | Facility: CLINIC | Age: 55
End: 2025-04-29
Payer: COMMERCIAL

## 2025-05-13 NOTE — PROGRESS NOTES
Sinus & Skull Base Surgery    Chief Complaint:  1. Chronic sinusitis with nasal polyposis s/p bilateral sinus surgery 5/7/20 -- IP noted on the left  2. Nasal airway obstruction s/p septoplasty with turbinate outfracture 5/7/20  3. Postnasal drainage  4. Decreased sense of smell  5. Headaches  6. Throat clearing, coughing, dysphonia  7. Epistaxis  8. Negative allergy testing in 2019, history of immunotherapy    History Of Present Illness:  Elena Michelle presents since last being seen 5/24/2023.     [facility] for Crohn's in Oct 2024.    Sinus issues worsening 2 months -- started with sneezing a lot both anterior and posterior drainage like glue    Prior to this recent exacerbation, her smell was normal.    Main Symptoms:  Patient has anterior nasal drainage.  Patient has posterior nasal drainage.  Patient has nasal airway obstruction.  Patient has facial pain.  Patient has facial pressure.  Patient has decreased sense of smell. Decreased 0 % of normal.  Associated Symptoms:  Patient has headaches.  Only when pressure builds.   Patient has throat clearing.  Patient has coughing.  Patient has dysphonia.  Off and on.  Patient has sneezing.  Patient has nasal bleeding.  On paper towels    Medications currently on for sinonasal symptoms:  Mucinex D (no benefit)    Medications tried in the past for sinonasal symptoms:  Decadron  Augmentin    Active Problems:  Problem List[1]    Past Medical History:  She has a past medical history of Arthritis, Aspirin sensitivity, BMI 31.0-31.9,adult, Carpal tunnel syndrome, Chronic diarrhea, Chronic ethmoidal sinusitis, Chronic maxillary sinusitis, COVID-19, Crohn's colitis (Multi), Crohn's colitis (Multi), Decreased sense of smell, Fatty liver, GERD (gastroesophageal reflux disease), Ileitis, Memory loss, Nasal polyp, Pain in left foot (07/01/2021), Pain in right foot (07/01/2021), Plantar fasciitis, bilateral, Post-nasal drip, Rhinorrhea, Wears contact lenses, Wears dentures,  "and Wears glasses.    She has no past medical history of Personal history of irradiation.    Surgical History:  She has a past surgical history that includes Tonsillectomy; Cholecystectomy; Colonoscopy; Hysterectomy;  section, classic; and Nose surgery.    Family History:  Family History[2]    Social History:  She reports that she has been smoking cigarettes. She has never been exposed to tobacco smoke. She has never used smokeless tobacco. She reports that she does not currently use alcohol. She reports that she does not use drugs.    Allergies:  Hydrocodone-acetaminophen, Ibuprofen, Naproxen sodium, Nsaids (non-steroidal anti-inflammatory drug), and Propoxyphene-acetaminophen    Current Meds:  Current Medications[3]    Vitals:  Visit Vitals  Ht 1.549 m (5' 1\")   Wt 77.6 kg (171 lb 1.6 oz)   BMI 32.33 kg/m²   OB Status Hysterectomy   Smoking Status Some Days   BSA 1.83 m²     Physical Exam:  Nose: On external exam there are neither lesions nor asymmetry of the nasal tip/dorsum. On anterior rhinoscopy, visualization posteriorly is limited on anterior examination. For this reason, to adequately evaluate posteriorly for masses, source of epistaxis, polypoid disease, debridement, and/or signs of infections, nasal endoscopy is indicated.  (Please see procedure below.)    SINONASAL ENDOSCOPY (CPT 59604): To better evaluate the patient's symptoms, sinonasal endoscopy is indicated.  After discussion of risks and benefits, and topical decongestion and anesthesia, an endoscope was used to perform nasal endoscopy on each side.  A time out identifying the patient, the procedure, the location of the procedure and any concerns was performed prior to beginning the procedure.    Findings:  Examination of the right nasal cavity revealed a normal middle meatus and sphenoethmoid recess. The maxillary was patent with discolored mucous. The ethmoid was patent. There was edema in the superior ethmoid. ***Except for the right " maxillary, there is no pus on the right side. Examination of the left nasal cavity revealed a patent maxillary sinus with mildly edematous mucosa. There was more moderate edema in the ethmoid.    Provider Impressions:  1. Chronic sinusitis with nasal polyposis s/p bilateral sinus surgery 5/7/20 -- IP noted on the left  2. Nasal airway obstruction s/p septoplasty with turbinate outfracture 5/7/20  3. Postnasal drainage  4. Decreased sense of smell  5. Facial pain/pressure, headaches  6. Throat clearing, coughing, dysphonia  7. Epistaxis  8. Negative allergy testing in 2019, history of immunotherapy    Discussion:  Elena Michelle     Discussed intranasal medical therapy including mometasone rinses, Xhance. I highly recommended mometasone rinses.     Prescribed doxycycline x 10 days.    Prescribed Xhance for nasal polyps. 2 puffs each side twice per day. If Xhance is expensive, she should let me know.    Discussed using another biologic for her sinuses.    Follow up with me in 3 months. All questions were answered.    Scribe Attestation  By signing my name below, I, Paloma [employer], attest that this documentation has been prepared under the direction and in the presence of Jerry Herrera MD.    Signature:  Jerry Herrera MD         [1]   Patient Active Problem List  Diagnosis    Crohn's disease (Multi)    Memory loss    Needs smoking cessation education    Otitis media, recurrent    Joint stiffness    Steatosis of liver    Nasal polyp    Diffuse cystic mastopathy    Gastroesophageal reflux disease with esophagitis    Obesity with body mass index 30 or greater   [2]   Family History  Problem Relation Name Age of Onset    No Known Problems Mother      No Known Problems Father      No Known Problems Sister      No Known Problems Brother      No Known Problems Daughter      No Known Problems Son      No Known Problems Mother's Sister      No Known Problems Mother's Brother      No Known Problems Father's  Sister      No Known Problems Father's Brother      No Known Problems Maternal Grandmother      No Known Problems Maternal Grandfather      No Known Problems Paternal Grandmother      No Known Problems Paternal Grandfather      No Known Problems Other     [3]   Current Outpatient Medications:     omeprazole (PriLOSEC) 40 mg DR capsule, Take 1 capsule (40 mg) by mouth once daily. Do not crush or chew., Disp: 30 capsule, Rfl: 3    risankizumab-rzaa (Skyrizi) 360 mg/2.4 mL (150 mg/mL) wearable injector injection, Inject 2.4 mL (360 mg) under the skin every 8 (eight) weeks starting at week 12 as directed per provider., Disp: 2.4 mL, Rfl: 3    acetaminophen (Tylenol) 500 mg tablet, Take 1 tablet (500 mg) by mouth every 8 hours if needed for pain. (Patient not taking: Reported on 5/14/2025), Disp: , Rfl:     dexAMETHasone (Decadron) 6 mg tablet, Take 2 tablets (12 mg) by mouth 1 time for 1 dose. (Patient not taking: Reported on 5/14/2025), Disp: 2 tablet, Rfl: 0    doxycycline (Monodox) 100 mg capsule, Take 1 capsule (100 mg) by mouth 2 times a day for 10 days. Take with at least 8 ounces (large glass) of water, do not lie down for 30 minutes after, Disp: 20 capsule, Rfl: 0

## 2025-05-14 ENCOUNTER — OFFICE VISIT (OUTPATIENT)
Facility: CLINIC | Age: 55
End: 2025-05-14
Payer: COMMERCIAL

## 2025-05-14 VITALS — HEIGHT: 61 IN | WEIGHT: 171.1 LBS | BODY MASS INDEX: 32.3 KG/M2

## 2025-05-14 DIAGNOSIS — R44.8 FACIAL PRESSURE: ICD-10-CM

## 2025-05-14 DIAGNOSIS — J34.89 NASAL CONGESTION WITH RHINORRHEA: ICD-10-CM

## 2025-05-14 DIAGNOSIS — J33.9 NASAL POLYP: Primary | ICD-10-CM

## 2025-05-14 DIAGNOSIS — R43.8 DECREASED SENSE OF SMELL: ICD-10-CM

## 2025-05-14 DIAGNOSIS — R09.81 NASAL CONGESTION WITH RHINORRHEA: ICD-10-CM

## 2025-05-14 PROCEDURE — 99213 OFFICE O/P EST LOW 20 MIN: CPT | Performed by: OTOLARYNGOLOGY

## 2025-05-14 PROCEDURE — 3008F BODY MASS INDEX DOCD: CPT | Performed by: OTOLARYNGOLOGY

## 2025-05-14 PROCEDURE — 31231 NASAL ENDOSCOPY DX: CPT | Performed by: OTOLARYNGOLOGY

## 2025-05-14 RX ORDER — FLUTICASONE PROPIONATE 93 UG/1
SPRAY, METERED NASAL
Qty: 32 ML | Refills: 11 | Status: SHIPPED | OUTPATIENT
Start: 2025-05-14

## 2025-05-14 RX ORDER — DOXYCYCLINE 100 MG/1
100 CAPSULE ORAL 2 TIMES DAILY
Qty: 20 CAPSULE | Refills: 0 | Status: SHIPPED | OUTPATIENT
Start: 2025-05-14 | End: 2025-05-24

## 2025-05-14 ASSESSMENT — PAIN SCALES - GENERAL: PAINLEVEL_OUTOF10: 0-NO PAIN

## 2025-05-31 NOTE — PROGRESS NOTES
History of Present Illness:   Patient presents today for initial evaluation regarding ongoing pain at the CMC joint.  She has been evaluated previously by primary care as well as outside orthopedist for possible carpal tunnel syndrome, EMG was determined to be a normal study.  She received corticosteroid injection recently but does not recall exactly where, could have been carpal tunnel and/or CMC injection.  She states that the injection did not help her whatsoever, she is unable to take NSAIDs in large quantities due to Crohn's disease.  She states that she received labs recently for evaluation of inflammatory markers however these were all negative.    She currently works at a [employer], uses her hands regularly, left-hand-dominant.    Past Medical History:   Diagnosis Date    Aspirin sensitivity     BMI 31.0-31.9,adult     Chronic diarrhea     Chronic ethmoidal sinusitis     Chronic maxillary sinusitis     Crohn's colitis (CMS/HCC)     Decreased sense of smell     Fatty liver     Ileitis     Memory loss     Nasal polyp     Pain in left foot 2021    Left foot pain    Pain in right foot 2021    Right foot pain    Plantar fasciitis, bilateral     Post-nasal drip     Rhinorrhea      Past Surgical History:   Procedure Laterality Date     SECTION, CLASSIC      CHOLECYSTECTOMY      COLONOSCOPY      (Fiberoptic)    HYSTERECTOMY      NOSE SURGERY      Rhinologic surgery    TONSILLECTOMY         Current Outpatient Medications:     acetaminophen (Tylenol) 500 mg tablet, Take 1 tablet (500 mg) by mouth every 8 hours if needed for pain., Disp: , Rfl:     budesonide EC (Entocort EC) 3 mg 24 hr capsule, Take 3 capsules (9 mg) by mouth once daily in the morning. (Patient not taking: Reported on 2024), Disp: 90 capsule, Rfl: 11    nicotine 21-14-7 mg/24 hr patch, TD daily, sequential, Place 1 each on the skin once every 24 hours. (Patient not taking: Reported on 2024), Disp:  Duplicate consult. Please see original consult note written by Cynthia Feliz and cosigned by Dr. Mandujano on 5/13/25.  Neurology will continue to follow    Inpatient consult to Neurology  Consult performed by: Lobo Ford DO  Consult ordered by: Naomi Rodriguez MD           30 each, Rfl: 1    Review of Systems   GENERAL: Negative  GI: Negative  MUSCULOSKELETAL: See HPI  SKIN: Negative  NEURO:  Negative    Physical Examination:  Bilateral wrist:  Skin healthy and intact  No gross swelling or ecchymosis    Volar flexion, dorsiflexion, pronation/supination without limitation  No tenderness to palpation over distal radius  No tenderness to palpation over distal ulna or TFCC  No tenderness to palpation over the scaphoid  Pain with CMC grind  Negative piano key sign  Negative Finkelstein test  Negative Moss's test    Neurovascular exam normal distally     Imaging:  Deferred    Assessment:   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  She is agreeable to referral to hand surgeon as well as possible steroid injection down the road.  Discussed x-ray at follow-up appointment with hand specialist.    Cheo Todd PA-C    In a face to face encounter, I evaluated the patient and performed a physical examination, discussed pertinent diagnostic studies if indicated and discussed diagnosis and management strategies with both the patient and physician assistant / nurse practitioner.  I reviewed the PA/NP's note and agree with the documented findings and plan of care.    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  She had a negative EMG as well as a cortisone injections provided by Dr. Gaines which did not provide any significant relief.  We reviewed this certainly could be atypical early carpal tunnel however she does have some significant pain of her CMC joints today right greater than left.  We discussed that the arthritis that joint may be causing some  subsequent neurologic sequelae downstream.    She states that springtime work does tend to slow down we discussed the possibility of surgical intervention she declined injections for the CMC joint today we will refer to hand specialist for evaluation.  Will defer specific x-rays to that visit.    Haroon Haywood MD

## 2025-06-12 ENCOUNTER — APPOINTMENT (OUTPATIENT)
Dept: PRIMARY CARE | Facility: CLINIC | Age: 55
End: 2025-06-12
Payer: COMMERCIAL

## 2025-06-12 VITALS
OXYGEN SATURATION: 94 % | TEMPERATURE: 96.8 F | WEIGHT: 170.3 LBS | SYSTOLIC BLOOD PRESSURE: 109 MMHG | HEART RATE: 85 BPM | DIASTOLIC BLOOD PRESSURE: 77 MMHG | BODY MASS INDEX: 32.18 KG/M2

## 2025-06-12 DIAGNOSIS — M54.50 LUMBAR PAIN: ICD-10-CM

## 2025-06-12 DIAGNOSIS — F17.200 TOBACCO USE DISORDER: ICD-10-CM

## 2025-06-12 DIAGNOSIS — Z12.31 ENCOUNTER FOR SCREENING MAMMOGRAM FOR BREAST CANCER: Primary | ICD-10-CM

## 2025-06-12 PROCEDURE — 99214 OFFICE O/P EST MOD 30 MIN: CPT | Performed by: FAMILY MEDICINE

## 2025-06-12 RX ORDER — IBUPROFEN 200 MG
1 TABLET ORAL EVERY 24 HOURS
Qty: 30 PATCH | Refills: 0 | Status: SHIPPED | OUTPATIENT
Start: 2025-06-12 | End: 2025-07-12

## 2025-06-12 ASSESSMENT — PAIN SCALES - GENERAL: PAINLEVEL_OUTOF10: 7

## 2025-06-12 NOTE — PROGRESS NOTES
Subjective   Patient ID: Elena Michelle is a 54 y.o. female who presents for 6 month follow-up.    HPI   6 month follow up  Low back pain for month    Stiffness left index finger, numbness thumbs: slow to resolve    Review of Systems   All other systems reviewed and are negative.      Objective   /77 (BP Location: Left arm, Patient Position: Sitting, BP Cuff Size: Adult)   Pulse 85   Temp 36 °C (96.8 °F) (Temporal)   Wt 77.2 kg (170 lb 4.8 oz)   SpO2 94%   BMI 32.18 kg/m²     Physical Exam  Vitals and nursing note reviewed.   Cardiovascular:      Rate and Rhythm: Normal rate and regular rhythm.   Pulmonary:      Effort: Pulmonary effort is normal.      Breath sounds: Normal breath sounds.   Musculoskeletal:      Cervical back: Neck supple.      Lumbar back: Tenderness (left lower back) present.   Neurological:      Mental Status: She is alert.         Assessment/Plan   Diagnoses and all orders for this visit:  Encounter for screening mammogram for breast cancer  -     BI mammo bilateral screening tomosynthesis; Future  Tobacco use disorder  -     nicotine (Nicoderm CQ) 14 mg/24 hr patch; Place 1 patch over 24 hours on the skin once every 24 hours.  Lumbar pain  -     Referral to Physical Therapy; Future  Other orders  -     Follow Up In Primary Care - Established  -     Follow Up In Primary Care - Established; Future

## 2025-06-19 ENCOUNTER — SPECIALTY PHARMACY (OUTPATIENT)
Dept: PHARMACY | Facility: CLINIC | Age: 55
End: 2025-06-19

## 2025-06-19 DIAGNOSIS — K50.919 CROHN'S DISEASE WITH COMPLICATION, UNSPECIFIED GASTROINTESTINAL TRACT LOCATION: ICD-10-CM

## 2025-06-19 RX ORDER — RISANKIZUMAB-RZAA 360 MG/2.4
360 WEARABLE INJECTOR SUBCUTANEOUS
Qty: 2.4 ML | Refills: 3 | Status: SHIPPED | OUTPATIENT
Start: 2025-06-19 | End: 2026-06-19

## 2025-06-24 ENCOUNTER — SPECIALTY PHARMACY (OUTPATIENT)
Dept: PHARMACY | Facility: CLINIC | Age: 55
End: 2025-06-24

## 2025-07-02 PROBLEM — M54.50 LUMBAR PAIN: Status: ACTIVE | Noted: 2025-07-02

## 2025-07-03 ENCOUNTER — DOCUMENTATION (OUTPATIENT)
Dept: PHYSICAL THERAPY | Facility: CLINIC | Age: 55
End: 2025-07-03
Payer: COMMERCIAL

## 2025-07-03 NOTE — PROGRESS NOTES
Physical Therapy                      Therapy Communication Note    Patient Name: Elena Michelle  MRN: [id number]  Today's Date: 7/3/2025     Discipline: Physical Therapy    Missed Time: No Show    Comment: No show to today's PT evaluation.

## 2025-07-11 ENCOUNTER — SPECIALTY PHARMACY (OUTPATIENT)
Dept: PHARMACY | Facility: CLINIC | Age: 55
End: 2025-07-11

## 2025-07-11 PROCEDURE — RXMED WILLOW AMBULATORY MEDICATION CHARGE

## 2025-07-16 ENCOUNTER — PHARMACY VISIT (OUTPATIENT)
Dept: PHARMACY | Facility: CLINIC | Age: 55
End: 2025-07-16
Payer: COMMERCIAL

## 2025-09-03 ENCOUNTER — SPECIALTY PHARMACY (OUTPATIENT)
Dept: PHARMACY | Facility: CLINIC | Age: 55
End: 2025-09-03

## 2025-09-03 ENCOUNTER — PHARMACY VISIT (OUTPATIENT)
Dept: PHARMACY | Facility: CLINIC | Age: 55
End: 2025-09-03
Payer: COMMERCIAL

## 2025-09-03 PROCEDURE — RXMED WILLOW AMBULATORY MEDICATION CHARGE

## 2025-09-04 ENCOUNTER — TELEPHONE (OUTPATIENT)
Dept: PRIMARY CARE | Facility: CLINIC | Age: 55
End: 2025-09-04
Payer: COMMERCIAL

## 2025-09-10 ENCOUNTER — APPOINTMENT (OUTPATIENT)
Dept: PRIMARY CARE | Facility: CLINIC | Age: 55
End: 2025-09-10
Payer: COMMERCIAL

## 2025-12-16 ENCOUNTER — APPOINTMENT (OUTPATIENT)
Dept: PRIMARY CARE | Facility: CLINIC | Age: 55
End: 2025-12-16
Payer: COMMERCIAL

## (undated) DEVICE — STOCKINETTE, IMPERVIOUS, LARGE, 9IN X 48IN

## (undated) DEVICE — DRESSING, NON-ADHERENT, 3 X 3 IN, STERILE

## (undated) DEVICE — PADDING, CAST, SPECIALIST, 3 IN X 4 YD, STERILE

## (undated) DEVICE — Device

## (undated) DEVICE — SYRINGE, 10 CC, LUER LOCK

## (undated) DEVICE — DRAPE, SHEET, 17 X 23 IN

## (undated) DEVICE — GLOVE, SURGICAL, PROTEXIS PI MICRO, 7.5, PF, LF

## (undated) DEVICE — STRAP, ARM BOARD, 32 X 1.5

## (undated) DEVICE — DRAPE, C-ARM IMAGE

## (undated) DEVICE — CAUTERY, PENCIL, PUSH BUTTON, SMOKE EVAC, 70MM

## (undated) DEVICE — BANDAGE, ESMARK 4 IN X 9 FT, STERILE

## (undated) DEVICE — NEEDLE, SAFETY, 25 GA X 1.5 IN

## (undated) DEVICE — PAD, GROUNDING, ELECTROSURGICAL, W/9 FT CABLE, POLYHESIVE II, ADULT, LF

## (undated) DEVICE — SUTURE, ETHILON, 4-0, BLK, MONO, PS-2 18

## (undated) DEVICE — BANDAGE, COHESIVE, HAND TEAR, COFLEX, 2 X 5 YDS, LF

## (undated) DEVICE — GLOVE, SURGICAL, PROTEXIS PI , 7.0, PF, LF

## (undated) DEVICE — GOWN, SURGICAL, ROYAL SILK, LG, STERILE

## (undated) DEVICE — SOLUTION, IRRIGATION, STERILE WATER, 1000 ML, POUR BOTTLE

## (undated) DEVICE — BOWL, BASIN, 32 OZ, STERILE

## (undated) DEVICE — TUBING, SUCTION, 6MM X 10, CLEAN N-COND

## (undated) DEVICE — SLING, ARM, MEDIUM

## (undated) DEVICE — TOWEL PACK, STERILE, 4/PACK, BLUE

## (undated) DEVICE — SUTURE, ETHILON, 3-0, 18 IN, PS2, BLACK

## (undated) DEVICE — APPLICATOR, CHLORAPREP, W/ORANGE TINT, 26ML

## (undated) DEVICE — TOWEL PACK, STERILE, 16X24, XRAY DETECTABLE, BLUE, 4/PK

## (undated) DEVICE — GLOVE, SURGICAL, PROTEXIS PI , 7.5, PF, LF

## (undated) DEVICE — GOWN, SURGICAL, ROYAL SILK, XL, STERILE

## (undated) DEVICE — SLING, ARM, W/LEATHERETTE PAD, MEDIUM

## (undated) DEVICE — GLOVE, SURGICAL, PROTEXIS PI MICRO, 7.0, PF, LF

## (undated) DEVICE — SOLUTION, IRRIGATION, SODIUM CHLORIDE 0.9%, 1000 ML, POUR BOTTLE

## (undated) DEVICE — DRESSING, GAUZE, SPONGE, 12 PLY, 4 X 4 IN, PLASTIC POUCH, STRL 10PK

## (undated) DEVICE — SYRINGE, 60 CC, IRRIGATION, BULB, CONTRO-BULB, PAPER POUCH

## (undated) DEVICE — CUFF, TOURNIQUET, DUAL PORT, SNG BLADDER, 18 IN, PLC

## (undated) DEVICE — ELECTRODE, ELECTROSURGICAL, BLADE, INSULATED, ENT/IMA, STERILE

## (undated) DEVICE — SUTURE, ETHIBOND, XTRA, 2-0, SH-2, GREEN

## (undated) DEVICE — SUTURE, ETHILON, 3-0, 18 IN, PS1, BLACK

## (undated) DEVICE — SPLINT, FAST SETTING, 4 X 15 IN, PLASTER

## (undated) DEVICE — BANDAGE, GAUZE, 6 PLY, KERLIX, 4.5 IN X 4.1 YD, AMD, STERILE

## (undated) DEVICE — SUTURE, VICRYL, 2-0, 27 IN, X-1, UNDYED

## (undated) DEVICE — DRESSING, GAUZE, SUPER KERLIX, 6X6

## (undated) DEVICE — GLOVE, SURGICAL, PROTEXIS PI , 6.5, PF, LF

## (undated) DEVICE — STRAP, VELCRO, BODY, 4 X 60IN, NS

## (undated) DEVICE — BANDAGE, ELASTIC, MATRIX, SELF-CLOSURE, 4 IN X 5 YD, LF

## (undated) DEVICE — MANIFOLD, 4 PORT NEPTUNE STANDARD